# Patient Record
Sex: MALE | Race: WHITE | Employment: OTHER | URBAN - METROPOLITAN AREA
[De-identification: names, ages, dates, MRNs, and addresses within clinical notes are randomized per-mention and may not be internally consistent; named-entity substitution may affect disease eponyms.]

---

## 2020-02-14 ENCOUNTER — HOSPITAL ENCOUNTER (INPATIENT)
Age: 85
LOS: 2 days | Discharge: HOME | DRG: 308 | End: 2020-02-17
Attending: EMERGENCY MEDICINE | Admitting: INTERNAL MEDICINE
Payer: MEDICARE

## 2020-02-14 ENCOUNTER — APPOINTMENT (OUTPATIENT)
Dept: GENERAL RADIOLOGY | Age: 85
DRG: 308 | End: 2020-02-14
Attending: EMERGENCY MEDICINE
Payer: MEDICARE

## 2020-02-14 DIAGNOSIS — I48.91 ATRIAL FIBRILLATION WITH RAPID VENTRICULAR RESPONSE (HCC): ICD-10-CM

## 2020-02-14 DIAGNOSIS — J90 BILATERAL PLEURAL EFFUSION: ICD-10-CM

## 2020-02-14 DIAGNOSIS — R06.03 RESPIRATORY DISTRESS: ICD-10-CM

## 2020-02-14 DIAGNOSIS — I50.9 ACUTE CONGESTIVE HEART FAILURE, UNSPECIFIED HEART FAILURE TYPE (HCC): ICD-10-CM

## 2020-02-14 DIAGNOSIS — J96.01 ACUTE RESPIRATORY FAILURE WITH HYPOXIA (HCC): Primary | ICD-10-CM

## 2020-02-14 LAB
ALBUMIN SERPL-MCNC: 3.2 G/DL (ref 3.5–5)
ALBUMIN/GLOB SERPL: 0.8 {RATIO} (ref 1.1–2.2)
ALP SERPL-CCNC: 84 U/L (ref 45–117)
ALT SERPL-CCNC: 45 U/L (ref 12–78)
ANION GAP SERPL CALC-SCNC: 8 MMOL/L (ref 5–15)
AST SERPL-CCNC: 40 U/L (ref 15–37)
BASOPHILS # BLD: 0 K/UL (ref 0–0.1)
BASOPHILS NFR BLD: 0 % (ref 0–1)
BILIRUB SERPL-MCNC: 0.9 MG/DL (ref 0.2–1)
BNP SERPL-MCNC: 8680 PG/ML
BUN SERPL-MCNC: 24 MG/DL (ref 6–20)
BUN/CREAT SERPL: 19 (ref 12–20)
CALCIUM SERPL-MCNC: 9.1 MG/DL (ref 8.5–10.1)
CHLORIDE SERPL-SCNC: 110 MMOL/L (ref 97–108)
CO2 SERPL-SCNC: 26 MMOL/L (ref 21–32)
CREAT SERPL-MCNC: 1.29 MG/DL (ref 0.7–1.3)
DIFFERENTIAL METHOD BLD: ABNORMAL
EOSINOPHIL # BLD: 0.1 K/UL (ref 0–0.4)
EOSINOPHIL NFR BLD: 2 % (ref 0–7)
ERYTHROCYTE [DISTWIDTH] IN BLOOD BY AUTOMATED COUNT: 17.2 % (ref 11.5–14.5)
GLOBULIN SER CALC-MCNC: 3.8 G/DL (ref 2–4)
GLUCOSE SERPL-MCNC: 122 MG/DL (ref 65–100)
HCT VFR BLD AUTO: 41.4 % (ref 36.6–50.3)
HGB BLD-MCNC: 13 G/DL (ref 12.1–17)
IMM GRANULOCYTES # BLD AUTO: 0 K/UL (ref 0–0.04)
IMM GRANULOCYTES NFR BLD AUTO: 0 % (ref 0–0.5)
INR PPP: 2.9 (ref 0.9–1.1)
LYMPHOCYTES # BLD: 1 K/UL (ref 0.8–3.5)
LYMPHOCYTES NFR BLD: 15 % (ref 12–49)
MAGNESIUM SERPL-MCNC: 2.3 MG/DL (ref 1.6–2.4)
MCH RBC QN AUTO: 28.3 PG (ref 26–34)
MCHC RBC AUTO-ENTMCNC: 31.4 G/DL (ref 30–36.5)
MCV RBC AUTO: 90 FL (ref 80–99)
MONOCYTES # BLD: 0.9 K/UL (ref 0–1)
MONOCYTES NFR BLD: 13 % (ref 5–13)
NEUTS SEG # BLD: 4.8 K/UL (ref 1.8–8)
NEUTS SEG NFR BLD: 70 % (ref 32–75)
NRBC # BLD: 0 K/UL (ref 0–0.01)
NRBC BLD-RTO: 0 PER 100 WBC
PLATELET # BLD AUTO: 225 K/UL (ref 150–400)
PMV BLD AUTO: 9.9 FL (ref 8.9–12.9)
POTASSIUM SERPL-SCNC: 4 MMOL/L (ref 3.5–5.1)
PROT SERPL-MCNC: 7 G/DL (ref 6.4–8.2)
PROTHROMBIN TIME: 28.2 SEC (ref 9–11.1)
RBC # BLD AUTO: 4.6 M/UL (ref 4.1–5.7)
SODIUM SERPL-SCNC: 144 MMOL/L (ref 136–145)
TROPONIN I SERPL-MCNC: <0.05 NG/ML
TSH SERPL DL<=0.05 MIU/L-ACNC: 1.4 UIU/ML (ref 0.36–3.74)
WBC # BLD AUTO: 6.9 K/UL (ref 4.1–11.1)

## 2020-02-14 PROCEDURE — 99285 EMERGENCY DEPT VISIT HI MDM: CPT

## 2020-02-14 PROCEDURE — 80053 COMPREHEN METABOLIC PANEL: CPT

## 2020-02-14 PROCEDURE — 84443 ASSAY THYROID STIM HORMONE: CPT

## 2020-02-14 PROCEDURE — 85610 PROTHROMBIN TIME: CPT

## 2020-02-14 PROCEDURE — 85025 COMPLETE CBC W/AUTO DIFF WBC: CPT

## 2020-02-14 PROCEDURE — 96366 THER/PROPH/DIAG IV INF ADDON: CPT

## 2020-02-14 PROCEDURE — 74011000250 HC RX REV CODE- 250: Performed by: EMERGENCY MEDICINE

## 2020-02-14 PROCEDURE — 83880 ASSAY OF NATRIURETIC PEPTIDE: CPT

## 2020-02-14 PROCEDURE — 74011250636 HC RX REV CODE- 250/636: Performed by: EMERGENCY MEDICINE

## 2020-02-14 PROCEDURE — 71045 X-RAY EXAM CHEST 1 VIEW: CPT

## 2020-02-14 PROCEDURE — 84484 ASSAY OF TROPONIN QUANT: CPT

## 2020-02-14 PROCEDURE — 83735 ASSAY OF MAGNESIUM: CPT

## 2020-02-14 PROCEDURE — 36415 COLL VENOUS BLD VENIPUNCTURE: CPT

## 2020-02-14 PROCEDURE — 96365 THER/PROPH/DIAG IV INF INIT: CPT

## 2020-02-14 PROCEDURE — 93005 ELECTROCARDIOGRAM TRACING: CPT

## 2020-02-14 PROCEDURE — 94761 N-INVAS EAR/PLS OXIMETRY MLT: CPT

## 2020-02-14 RX ORDER — BENZONATATE 100 MG/1
100 CAPSULE ORAL
COMMUNITY
End: 2021-04-12

## 2020-02-14 RX ORDER — MELATONIN
1000 DAILY
COMMUNITY

## 2020-02-14 RX ORDER — LATANOPROST 50 UG/ML
1 SOLUTION/ DROPS OPHTHALMIC
COMMUNITY

## 2020-02-14 RX ORDER — WARFARIN SODIUM 5 MG/1
5 TABLET ORAL
COMMUNITY
End: 2021-04-12

## 2020-02-14 RX ORDER — CODEINE PHOSPHATE AND GUAIFENESIN 10; 100 MG/5ML; MG/5ML
5 SOLUTION ORAL
COMMUNITY
End: 2021-04-12

## 2020-02-14 RX ORDER — METOPROLOL SUCCINATE 50 MG/1
50 TABLET, EXTENDED RELEASE ORAL DAILY
COMMUNITY
End: 2020-02-17

## 2020-02-14 RX ADMIN — DEXTROSE MONOHYDRATE 2.5 MG/HR: 50 INJECTION, SOLUTION INTRAVENOUS at 20:44

## 2020-02-15 ENCOUNTER — APPOINTMENT (OUTPATIENT)
Dept: GENERAL RADIOLOGY | Age: 85
DRG: 308 | End: 2020-02-15
Attending: INTERNAL MEDICINE
Payer: MEDICARE

## 2020-02-15 ENCOUNTER — APPOINTMENT (OUTPATIENT)
Dept: NON INVASIVE DIAGNOSTICS | Age: 85
DRG: 308 | End: 2020-02-15
Attending: INTERNAL MEDICINE
Payer: MEDICARE

## 2020-02-15 PROBLEM — I48.91 ATRIAL FIBRILLATION WITH RAPID VENTRICULAR RESPONSE (HCC): Status: ACTIVE | Noted: 2020-02-15

## 2020-02-15 LAB
ALBUMIN SERPL-MCNC: 2.8 G/DL (ref 3.5–5)
ALBUMIN/GLOB SERPL: 0.8 {RATIO} (ref 1.1–2.2)
ALP SERPL-CCNC: 69 U/L (ref 45–117)
ALT SERPL-CCNC: 39 U/L (ref 12–78)
ANION GAP SERPL CALC-SCNC: 7 MMOL/L (ref 5–15)
AST SERPL-CCNC: 38 U/L (ref 15–37)
ATRIAL RATE: 139 BPM
AV VELOCITY RATIO: 0.54
BASOPHILS # BLD: 0 K/UL (ref 0–0.1)
BASOPHILS NFR BLD: 0 % (ref 0–1)
BILIRUB SERPL-MCNC: 1.1 MG/DL (ref 0.2–1)
BUN SERPL-MCNC: 23 MG/DL (ref 6–20)
BUN/CREAT SERPL: 21 (ref 12–20)
CALCIUM SERPL-MCNC: 9 MG/DL (ref 8.5–10.1)
CALCULATED R AXIS, ECG10: -115 DEGREES
CALCULATED T AXIS, ECG11: -129 DEGREES
CHLORIDE SERPL-SCNC: 111 MMOL/L (ref 97–108)
CO2 SERPL-SCNC: 26 MMOL/L (ref 21–32)
COMMENT, HOLDF: NORMAL
CREAT SERPL-MCNC: 1.11 MG/DL (ref 0.7–1.3)
DIAGNOSIS, 93000: NORMAL
DIFFERENTIAL METHOD BLD: ABNORMAL
ECHO AO ROOT DIAM: 3.92 CM
ECHO AV AREA PEAK VELOCITY: 2.2 CM2
ECHO AV AREA/BSA PEAK VELOCITY: 1 CM2/M2
ECHO AV PEAK GRADIENT: 7.6 MMHG
ECHO AV PEAK VELOCITY: 137.47 CM/S
ECHO EST RA PRESSURE: 10 MMHG
ECHO LA AREA 4C: 28.6 CM2
ECHO LA MAJOR AXIS: 4.05 CM
ECHO LA TO AORTIC ROOT RATIO: 1.03
ECHO LA VOL 4C: 86.51 ML (ref 18–58)
ECHO LA VOLUME INDEX A4C: 42.33 ML/M2 (ref 16–28)
ECHO LV EDV TEICHHOLZ: 0.43 ML
ECHO LV ESV TEICHHOLZ: 0.43 ML
ECHO LV INTERNAL DIMENSION DIASTOLIC: 4.12 CM (ref 4.2–5.9)
ECHO LV INTERNAL DIMENSION SYSTOLIC: 4.09 CM
ECHO LV IVSD: 1.46 CM (ref 0.6–1)
ECHO LV MASS 2D: 221.1 G (ref 88–224)
ECHO LV MASS INDEX 2D: 108.2 G/M2 (ref 49–115)
ECHO LV POSTERIOR WALL DIASTOLIC: 1.08 CM (ref 0.6–1)
ECHO LV POSTERIOR WALL SYSTOLIC: 1.08 CM
ECHO LVOT DIAM: 2.27 CM
ECHO LVOT PEAK GRADIENT: 2.2 MMHG
ECHO LVOT PEAK VELOCITY: 73.91 CM/S
ECHO LVOT SV: 58.1 ML
ECHO LVOT VTI: 14.41 CM
ECHO MV A VELOCITY: 36.36 CM/S
ECHO MV AREA VTI: 2.6 CM2
ECHO MV E DECELERATION TIME (DT): 36.1 MS
ECHO MV E VELOCITY: 82.19 CM/S
ECHO MV E/A RATIO: 2.26
ECHO MV MAX VELOCITY: 80.71 CM/S
ECHO MV MEAN GRADIENT: 1 MMHG
ECHO MV MEAN INFLOW VELOCITY: 0.47 M/S
ECHO MV PEAK GRADIENT: 2.6 MMHG
ECHO MV VTI: 22.04 CM
ECHO PULMONARY ARTERY SYSTOLIC PRESSURE (PASP): 43.9 MMHG
ECHO RA AREA 4C: 23.04 CM2
ECHO RIGHT VENTRICULAR SYSTOLIC PRESSURE (RVSP): 43.9 MMHG
ECHO TV REGURGITANT MAX VELOCITY: 291.1 CM/S
ECHO TV REGURGITANT PEAK GRADIENT: 33.9 MMHG
EOSINOPHIL # BLD: 0.1 K/UL (ref 0–0.4)
EOSINOPHIL NFR BLD: 2 % (ref 0–7)
ERYTHROCYTE [DISTWIDTH] IN BLOOD BY AUTOMATED COUNT: 16.9 % (ref 11.5–14.5)
GLOBULIN SER CALC-MCNC: 3.3 G/DL (ref 2–4)
GLUCOSE SERPL-MCNC: 108 MG/DL (ref 65–100)
HCT VFR BLD AUTO: 37.1 % (ref 36.6–50.3)
HGB BLD-MCNC: 11.9 G/DL (ref 12.1–17)
IMM GRANULOCYTES # BLD AUTO: 0.1 K/UL (ref 0–0.04)
IMM GRANULOCYTES NFR BLD AUTO: 1 % (ref 0–0.5)
INR PPP: 3.1 (ref 0.9–1.1)
LVFS 2D: 0.59 %
LVOT MG: 1.28 MMHG
LVOT MV: 0.52 CM/S
LVSV (TEICH): 0.5 ML
LYMPHOCYTES # BLD: 0.7 K/UL (ref 0.8–3.5)
LYMPHOCYTES NFR BLD: 11 % (ref 12–49)
MCH RBC QN AUTO: 28.7 PG (ref 26–34)
MCHC RBC AUTO-ENTMCNC: 32.1 G/DL (ref 30–36.5)
MCV RBC AUTO: 89.6 FL (ref 80–99)
MONOCYTES # BLD: 0.8 K/UL (ref 0–1)
MONOCYTES NFR BLD: 12 % (ref 5–13)
MV DEC SLOPE: 22.77
NEUTS SEG # BLD: 4.6 K/UL (ref 1.8–8)
NEUTS SEG NFR BLD: 74 % (ref 32–75)
NRBC # BLD: 0 K/UL (ref 0–0.01)
NRBC BLD-RTO: 0 PER 100 WBC
PLATELET # BLD AUTO: 203 K/UL (ref 150–400)
PMV BLD AUTO: 10.3 FL (ref 8.9–12.9)
POTASSIUM SERPL-SCNC: 3.7 MMOL/L (ref 3.5–5.1)
PROT SERPL-MCNC: 6.1 G/DL (ref 6.4–8.2)
PROTHROMBIN TIME: 30.3 SEC (ref 9–11.1)
Q-T INTERVAL, ECG07: 292 MS
QRS DURATION, ECG06: 84 MS
QTC CALCULATION (BEZET), ECG08: 444 MS
RBC # BLD AUTO: 4.14 M/UL (ref 4.1–5.7)
RBC MORPH BLD: ABNORMAL
SAMPLES BEING HELD,HOLD: NORMAL
SODIUM SERPL-SCNC: 144 MMOL/L (ref 136–145)
TROPONIN I SERPL-MCNC: <0.05 NG/ML
TROPONIN I SERPL-MCNC: <0.05 NG/ML
VENTRICULAR RATE, ECG03: 139 BPM
WBC # BLD AUTO: 6.3 K/UL (ref 4.1–11.1)

## 2020-02-15 PROCEDURE — 74011250637 HC RX REV CODE- 250/637: Performed by: INTERNAL MEDICINE

## 2020-02-15 PROCEDURE — 71045 X-RAY EXAM CHEST 1 VIEW: CPT

## 2020-02-15 PROCEDURE — 85025 COMPLETE CBC W/AUTO DIFF WBC: CPT

## 2020-02-15 PROCEDURE — 74011000250 HC RX REV CODE- 250: Performed by: EMERGENCY MEDICINE

## 2020-02-15 PROCEDURE — 65660000000 HC RM CCU STEPDOWN

## 2020-02-15 PROCEDURE — 93306 TTE W/DOPPLER COMPLETE: CPT

## 2020-02-15 PROCEDURE — 36415 COLL VENOUS BLD VENIPUNCTURE: CPT

## 2020-02-15 PROCEDURE — 85610 PROTHROMBIN TIME: CPT

## 2020-02-15 PROCEDURE — 74011250636 HC RX REV CODE- 250/636: Performed by: INTERNAL MEDICINE

## 2020-02-15 PROCEDURE — 74011250636 HC RX REV CODE- 250/636: Performed by: EMERGENCY MEDICINE

## 2020-02-15 PROCEDURE — 74011000250 HC RX REV CODE- 250: Performed by: INTERNAL MEDICINE

## 2020-02-15 PROCEDURE — 80053 COMPREHEN METABOLIC PANEL: CPT

## 2020-02-15 PROCEDURE — 84484 ASSAY OF TROPONIN QUANT: CPT

## 2020-02-15 RX ORDER — FUROSEMIDE 10 MG/ML
20 INJECTION INTRAMUSCULAR; INTRAVENOUS ONCE
Status: COMPLETED | OUTPATIENT
Start: 2020-02-15 | End: 2020-02-15

## 2020-02-15 RX ORDER — LATANOPROST 50 UG/ML
1 SOLUTION/ DROPS OPHTHALMIC
Status: DISCONTINUED | OUTPATIENT
Start: 2020-02-15 | End: 2020-02-17 | Stop reason: HOSPADM

## 2020-02-15 RX ORDER — WARFARIN 2 MG/1
4 TABLET ORAL EVERY EVENING
Status: DISCONTINUED | OUTPATIENT
Start: 2020-02-15 | End: 2020-02-16

## 2020-02-15 RX ORDER — WARFARIN SODIUM 5 MG/1
5 TABLET ORAL EVERY EVENING
Status: DISCONTINUED | OUTPATIENT
Start: 2020-02-15 | End: 2020-02-15

## 2020-02-15 RX ORDER — SODIUM CHLORIDE 0.9 % (FLUSH) 0.9 %
5-40 SYRINGE (ML) INJECTION AS NEEDED
Status: DISCONTINUED | OUTPATIENT
Start: 2020-02-15 | End: 2020-02-17 | Stop reason: HOSPADM

## 2020-02-15 RX ORDER — ONDANSETRON 2 MG/ML
4 INJECTION INTRAMUSCULAR; INTRAVENOUS
Status: DISCONTINUED | OUTPATIENT
Start: 2020-02-15 | End: 2020-02-17 | Stop reason: HOSPADM

## 2020-02-15 RX ORDER — SODIUM CHLORIDE 0.9 % (FLUSH) 0.9 %
5-40 SYRINGE (ML) INJECTION EVERY 8 HOURS
Status: DISCONTINUED | OUTPATIENT
Start: 2020-02-15 | End: 2020-02-17 | Stop reason: HOSPADM

## 2020-02-15 RX ORDER — METOPROLOL SUCCINATE 25 MG/1
25 TABLET, EXTENDED RELEASE ORAL ONCE
Status: COMPLETED | OUTPATIENT
Start: 2020-02-15 | End: 2020-02-15

## 2020-02-15 RX ORDER — ATORVASTATIN CALCIUM 20 MG/1
20 TABLET, FILM COATED ORAL DAILY
Status: DISCONTINUED | OUTPATIENT
Start: 2020-02-15 | End: 2020-02-17 | Stop reason: HOSPADM

## 2020-02-15 RX ORDER — ACETAMINOPHEN 325 MG/1
650 TABLET ORAL
Status: DISCONTINUED | OUTPATIENT
Start: 2020-02-15 | End: 2020-02-17 | Stop reason: HOSPADM

## 2020-02-15 RX ORDER — METOPROLOL SUCCINATE 50 MG/1
50 TABLET, EXTENDED RELEASE ORAL DAILY
Status: DISCONTINUED | OUTPATIENT
Start: 2020-02-15 | End: 2020-02-15

## 2020-02-15 RX ORDER — BENZONATATE 100 MG/1
100 CAPSULE ORAL
Status: DISCONTINUED | OUTPATIENT
Start: 2020-02-15 | End: 2020-02-17 | Stop reason: HOSPADM

## 2020-02-15 RX ADMIN — METOPROLOL SUCCINATE 50 MG: 50 TABLET, EXTENDED RELEASE ORAL at 08:56

## 2020-02-15 RX ADMIN — FUROSEMIDE 20 MG: 10 INJECTION, SOLUTION INTRAMUSCULAR; INTRAVENOUS at 18:00

## 2020-02-15 RX ADMIN — LATANOPROST 1 DROP: 50 SOLUTION OPHTHALMIC at 22:33

## 2020-02-15 RX ADMIN — BENZONATATE 100 MG: 100 CAPSULE ORAL at 08:56

## 2020-02-15 RX ADMIN — DEXTROSE MONOHYDRATE 10 MG/HR: 50 INJECTION, SOLUTION INTRAVENOUS at 04:54

## 2020-02-15 RX ADMIN — Medication 5 ML: at 22:33

## 2020-02-15 RX ADMIN — Medication 5 ML: at 06:03

## 2020-02-15 RX ADMIN — METOPROLOL SUCCINATE 25 MG: 25 TABLET, EXTENDED RELEASE ORAL at 13:15

## 2020-02-15 RX ADMIN — DEXTROSE MONOHYDRATE 5 MG/HR: 50 INJECTION, SOLUTION INTRAVENOUS at 18:00

## 2020-02-15 RX ADMIN — Medication 10 ML: at 13:16

## 2020-02-15 RX ADMIN — ATORVASTATIN CALCIUM 20 MG: 20 TABLET, FILM COATED ORAL at 08:56

## 2020-02-15 RX ADMIN — WARFARIN SODIUM 4 MG: 2 TABLET ORAL at 17:10

## 2020-02-15 NOTE — ED NOTES
Pt c/o shortness of breath with palpitations, fatigue & cough. Came via POV from [facility] ED after leaving AMA today, \"I wanted to be closer here to my daughter. \" Was initially referred by provider today for concern of pnemonia. 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 Was hypoxic well as tachycardic. at doctors office today. Was given lasix, diltiazem bolus x2, lopressor x2 @ [facility] ED prior to leaving this evening. Denies CP or home oxygen as well has no cardiologist.     Darwin Faye: TRANSFER - OUT REPORT:    Verbal report given to Héctor West RN (name) on Dianne Mora  being transferred to PCU (unit) for routine progression of care       Report consisted of patients Situation, Background, Assessment and   Recommendations(SBAR). Information from the following report(s) SBAR, Kardex, ED Summary, Intake/Output, MAR, Recent Results, Med Rec Status and Cardiac Rhythm Afib was reviewed with the receiving nurse. Lines:   Peripheral IV 02/14/20 Right Antecubital (Active)   Site Assessment Clean, dry, & intact 2/14/2020  8:12 PM   Phlebitis Assessment 0 2/14/2020  8:12 PM   Infiltration Assessment 0 2/14/2020  8:12 PM   Dressing Status Clean, dry, & intact 2/14/2020  8:12 PM   Dressing Type Transparent 2/14/2020  8:12 PM   Hub Color/Line Status Patent;Capped;Flushed 2/14/2020  8:12 PM   Action Taken Blood drawn 2/14/2020  8:12 PM   Alcohol Cap Used No 2/14/2020  8:12 PM       Peripheral IV 02/14/20 Left Antecubital (Active)   Site Assessment Clean, dry, & intact 2/14/2020  8:22 PM   Phlebitis Assessment 0 2/14/2020  8:22 PM   Infiltration Assessment 0 2/14/2020  8:22 PM   Dressing Status Clean, dry, & intact 2/14/2020  8:22 PM   Dressing Type Tape;Transparent 2/14/2020  8:22 PM   Hub Color/Line Status Pink 2/14/2020  8:22 PM   Alcohol Cap Used Yes 2/14/2020  8:22 PM        Opportunity for questions and clarification was provided.       Patient transported with:   Monitor  Registered Nurse  Tech   Belongings & chart transferred to floor with patient.

## 2020-02-15 NOTE — H&P
Hospitalist Admission Note    NAME: Mario Adan   :  1928   MRN:  [id number]     Date/Time:  2/15/2020 2:26 AM    Patient PCP: Bill Palomino MD  ______________________________________________________________________  Given the patient's current clinical presentation, I have a high level of concern for decompensation if discharged from the emergency department. Complex decision making was performed, which includes reviewing the patient's available past medical records, laboratory results, and x-ray films. My assessment of this patient's clinical condition and my plan of care is as follows. Assessment / Plan:  Atrial fibrillation with RVR  -Admit patient to stepdown  - Start patient on Cardizem drip  -Follow-up serial troponin-echocardiogram  -Cardiology consultation    Bilateral pleural effusions and interstitial edema  -start patient on lasix     Hypertension-continue home antihypertensive medication    Hyperlipidemia-continue Zocor 20 mg daily    Glucoma  -Continue Lantus processed       HX DVT  -Continue Coumadin as per pharmacy dose      Code Status: Full   Surrogate Decision Maker:Caron Paulson    DVT Prophylaxis: coumadin   GI Prophylaxis: not indicated        Subjective:   CHIEF COMPLAINT: palpitation     HISTORY OF PRESENT ILLNESS:     80years old male with past medical history significant for hypertension, history of DVT, hyperlipidemia was transferred from [facility] for evaluation of atrial fibrillation with RVR associated with shortness of breath worsened with exertion patient denied chest pain denied fever any chills patient also complained from productive cough whitish colored sputum but denies any fever any chills, chest x-ray was done and show bilateral pleural effusion and interstitial edema    We were asked to admit for work up and evaluation of the above problems.      Past Medical History:   Diagnosis Date    Arthritis     DJD    Cancer [facility])     prostate    Chronic pain     History of colonoscopy with polypectomy 2005~    Hypercholesterolemia     Other ill-defined conditions(799.89)     cataracts    Thromboembolus ([address]     3 x left leg, 1 x right leg        Past Surgical History:   Procedure Laterality Date    HX CATARACT REMOVAL      HX MOHS PROCEDURES  1995    right    HX OTHER SURGICAL  1996    Tony filter insertion    HX PROSTATECTOMY  age 72    HX TONSILLECTOMY      TOTAL HIP ARTHROPLASTY      left    TOTAL KNEE ARTHROPLASTY      right       Social History     Tobacco Use    Smoking status: Former Smoker    Smokeless tobacco: Never Used    Tobacco comment: quit 15 yrs ago   Substance Use Topics    Alcohol use: Yes     Comment: seldom        Family History   Family history unknown: Yes     No Known Allergies     Prior to Admission medications    Medication Sig Start Date End Date Taking? Authorizing Provider   warfarin sodium (COUMADIN PO) Take 7.5 mg by mouth five (5) days a week. Tuesday, Wednesday, Thursday, Saturday & Sunday   Yes Other, MD Clement   warfarin (COUMADIN) 5 mg tablet Take 5 mg by mouth every Monday. Yes Clement Mota MD   warfarin (COUMADIN) 5 mg tablet Take 5 mg by mouth Every Friday. Yes Nakul, MD Clement   vit C/E/Zn/coppr/lutein/zeaxan (PRESERVISION AREDS-2 PO) Take  by mouth daily. Yes Clement Mota MD   metoprolol succinate (TOPROL XL) 50 mg XL tablet Take 50 mg by mouth daily. Yes Nakul, MD Clement   latanoprost (XALATAN) 0.005 % ophthalmic solution Administer 1 Drop to both eyes nightly. Yes Nakul, MD Clement   guaiFENesin-codeine (ROBITUSSIN AC) 100-10 mg/5 mL solution Take 5 mL by mouth three (3) times daily as needed for Cough. Yes Nakul, MD Clement   cholecalciferol (VITAMIN D3) (1000 Units /25 mcg) tablet Take 1,000 Units by mouth daily. Yes Nakul, MD Clement   benzonatate (TESSALON PERLES) 100 mg capsule Take 100 mg by mouth three (3) times daily as needed for Cough.    Yes Clement Mota MD simvastatin (ZOCOR) 20 mg tablet Take 20 mg by mouth nightly. Indications: HYPERCHOLESTEROLEMIA    Provider, Historical       REVIEW OF SYSTEMS:     I am not able to complete the review of systems because: The patient is intubated and sedated    The patient has altered mental status due to his acute medical problems    The patient has baseline aphasia from prior stroke(s)    The patient has baseline dementia and is not reliable historian    The patient is in acute medical distress and unable to provide information           Total of 12 systems reviewed as follows:       POSITIVE= underlined text  Negative = text not underlined  General:  fever, chills, sweats, generalized weakness, weight loss/gain,      loss of appetite   Eyes:    blurred vision, eye pain, loss of vision, double vision  ENT:    rhinorrhea, pharyngitis   Respiratory:   cough, sputum production, SOB, MOMIN, wheezing, pleuritic pain   Cardiology:   chest pain, palpitations, orthopnea, PND, edema, syncope   Gastrointestinal:  abdominal pain , N/V, diarrhea, dysphagia, constipation, bleeding   Genitourinary:  frequency, urgency, dysuria, hematuria, incontinence   Muskuloskeletal :  arthralgia, myalgia, back pain  Hematology:  easy bruising, nose or gum bleeding, lymphadenopathy   Dermatological: rash, ulceration, pruritis, color change / jaundice  Endocrine:   hot flashes or polydipsia   Neurological:  headache, dizziness, confusion, focal weakness, paresthesia,     Speech difficulties, memory loss, gait difficulty  Psychological: Feelings of anxiety, depression, agitation    Objective:   VITALS:    Visit Vitals  /72   Pulse 92   Temp 98.5 °F (36.9 °C)   Resp 26   Ht 5' 8\" (1.727 m)   Wt 90.7 kg (200 lb)   SpO2 94%   BMI 30.41 kg/m²       PHYSICAL EXAM:    General:    Alert, cooperative, no distress, appears stated age.      HEENT: Atraumatic, anicteric sclerae, pink conjunctivae     No oral ulcers, mucosa moist, throat clear, dentition fair  Neck:  Supple, symmetrical,  thyroid: non tender  Lungs:   Clear to auscultation bilaterally. No Wheezing or Rhonchi. No rales. Chest wall:  No tenderness  No Accessory muscle use. Heart:   IRRegular  rhythm,  No  murmur   No edema  Abdomen:   Soft, non-tender. Not distended. Bowel sounds normal  Extremities: No cyanosis. No clubbing,      Skin turgor normal, Capillary refill normal, Radial dial pulse 2+  Skin:     Not pale. Not Jaundiced  No rashes   Psych:  Good insight. Not depressed. Not anxious or agitated. Neurologic: EOMs intact. No facial asymmetry. No aphasia or slurred speech. Symmetrical strength, Sensation grossly intact. Alert and oriented X 4.     _______________________________________________________________________  Care Plan discussed with:    Comments   Patient y    Family      RN y    Care Manager                    Consultant:      _______________________________________________________________________  Expected  Disposition:   Home with Family y   HH/PT/OT/RN    SNF/LTC    SHAISTA    ________________________________________________________________________  TOTAL TIME: 61  Minutes    Critical Care Provided     Minutes non procedure based      Comments    y Reviewed previous records   >50% of visit spent in counseling and coordination of care y Discussion with patient and/or family and questions answered       ________________________________________________________________________  Signed: Edward Marcial MD    Procedures: see electronic medical records for all procedures/Xrays and details which were not copied into this note but were reviewed prior to creation of Plan.     LAB DATA REVIEWED:    Recent Results (from the past 24 hour(s))   EKG, 12 LEAD, INITIAL    Collection Time: 02/14/20  7:52 PM   Result Value Ref Range    Ventricular Rate 139 BPM    Atrial Rate 139 BPM    QRS Duration 84 ms    Q-T Interval 292 ms    QTC Calculation (Bezet) 444 ms    Calculated R Axis -115 degrees Calculated T Axis -129 degrees    Diagnosis       Supraventricular tachycardia  Right superior axis deviation  ST & T wave abnormality, consider inferior ischemia  ST & T wave abnormality, consider anterolateral ischemia  No previous ECGs available     CBC WITH AUTOMATED DIFF    Collection Time: 02/14/20  8:12 PM   Result Value Ref Range    WBC 6.9 4.1 - 11.1 K/uL    RBC 4.60 4.10 - 5.70 M/uL    HGB 13.0 12.1 - 17.0 g/dL    HCT 41.4 36.6 - 50.3 %    MCV 90.0 80.0 - 99.0 FL    MCH 28.3 26.0 - 34.0 PG    MCHC 31.4 30.0 - 36.5 g/dL    RDW 17.2 (H) 11.5 - 14.5 %    PLATELET 389 411 - 854 K/uL    MPV 9.9 8.9 - 12.9 FL    NRBC 0.0 0  WBC    ABSOLUTE NRBC 0.00 0.00 - 0.01 K/uL    NEUTROPHILS 70 32 - 75 %    LYMPHOCYTES 15 12 - 49 %    MONOCYTES 13 5 - 13 %    EOSINOPHILS 2 0 - 7 %    BASOPHILS 0 0 - 1 %    IMMATURE GRANULOCYTES 0 0.0 - 0.5 %    ABS. NEUTROPHILS 4.8 1.8 - 8.0 K/UL    ABS. LYMPHOCYTES 1.0 0.8 - 3.5 K/UL    ABS. MONOCYTES 0.9 0.0 - 1.0 K/UL    ABS. EOSINOPHILS 0.1 0.0 - 0.4 K/UL    ABS. BASOPHILS 0.0 0.0 - 0.1 K/UL    ABS. IMM. GRANS. 0.0 0.00 - 0.04 K/UL    DF AUTOMATED     METABOLIC PANEL, COMPREHENSIVE    Collection Time: 02/14/20  8:12 PM   Result Value Ref Range    Sodium 144 136 - 145 mmol/L    Potassium 4.0 3.5 - 5.1 mmol/L    Chloride 110 (H) 97 - 108 mmol/L    CO2 26 21 - 32 mmol/L    Anion gap 8 5 - 15 mmol/L    Glucose 122 (H) 65 - 100 mg/dL    BUN 24 (H) 6 - 20 MG/DL    Creatinine 1.29 0.70 - 1.30 MG/DL    BUN/Creatinine ratio 19 12 - 20      GFR est AA >60 >60 ml/min/1.73m2    GFR est non-AA 52 (L) >60 ml/min/1.73m2    Calcium 9.1 8.5 - 10.1 MG/DL    Bilirubin, total 0.9 0.2 - 1.0 MG/DL    ALT (SGPT) 45 12 - 78 U/L    AST (SGOT) 40 (H) 15 - 37 U/L    Alk.  phosphatase 84 45 - 117 U/L    Protein, total 7.0 6.4 - 8.2 g/dL    Albumin 3.2 (L) 3.5 - 5.0 g/dL    Globulin 3.8 2.0 - 4.0 g/dL    A-G Ratio 0.8 (L) 1.1 - 2.2     TROPONIN I    Collection Time: 02/14/20  8:12 PM   Result Value Ref Range    Troponin-I, Qt. <0.05 <0.05 ng/mL   MAGNESIUM    Collection Time: 02/14/20  8:12 PM   Result Value Ref Range    Magnesium 2.3 1.6 - 2.4 mg/dL   PROTHROMBIN TIME + INR    Collection Time: 02/14/20  8:12 PM   Result Value Ref Range    INR 2.9 (H) 0.9 - 1.1      Prothrombin time 28.2 (H) 9.0 - 11.1 sec   NT-PRO BNP    Collection Time: 02/14/20  8:12 PM   Result Value Ref Range    NT pro-BNP 8,680 (H) <450 PG/ML   TSH 3RD GENERATION    Collection Time: 02/14/20  8:12 PM   Result Value Ref Range    TSH 1.40 0.36 - 8.03 uIU/mL   METABOLIC PANEL, COMPREHENSIVE    Collection Time: 02/15/20  1:25 AM   Result Value Ref Range    Sodium 144 136 - 145 mmol/L    Potassium 3.7 3.5 - 5.1 mmol/L    Chloride 111 (H) 97 - 108 mmol/L    CO2 26 21 - 32 mmol/L    Anion gap 7 5 - 15 mmol/L    Glucose 108 (H) 65 - 100 mg/dL    BUN 23 (H) 6 - 20 MG/DL    Creatinine 1.11 0.70 - 1.30 MG/DL    BUN/Creatinine ratio 21 (H) 12 - 20      GFR est AA >60 >60 ml/min/1.73m2    GFR est non-AA >60 >60 ml/min/1.73m2    Calcium 9.0 8.5 - 10.1 MG/DL    Bilirubin, total 1.1 (H) 0.2 - 1.0 MG/DL    ALT (SGPT) 39 12 - 78 U/L    AST (SGOT) 38 (H) 15 - 37 U/L    Alk. phosphatase 69 45 - 117 U/L    Protein, total 6.1 (L) 6.4 - 8.2 g/dL    Albumin 2.8 (L) 3.5 - 5.0 g/dL    Globulin 3.3 2.0 - 4.0 g/dL    A-G Ratio 0.8 (L) 1.1 - 2.2     CBC WITH AUTOMATED DIFF    Collection Time: 02/15/20  1:25 AM   Result Value Ref Range    WBC 6.3 4.1 - 11.1 K/uL    RBC 4.14 4.10 - 5.70 M/uL    HGB 11.9 (L) 12.1 - 17.0 g/dL    HCT 37.1 36.6 - 50.3 %    MCV 89.6 80.0 - 99.0 FL    MCH 28.7 26.0 - 34.0 PG    MCHC 32.1 30.0 - 36.5 g/dL    RDW 16.9 (H) 11.5 - 14.5 %    PLATELET 787 339 - 361 K/uL    MPV 10.3 8.9 - 12.9 FL    NRBC 0.0 0  WBC    ABSOLUTE NRBC 0.00 0.00 - 0.01 K/uL    NEUTROPHILS 74 32 - 75 %    LYMPHOCYTES 11 (L) 12 - 49 %    MONOCYTES 12 5 - 13 %    EOSINOPHILS 2 0 - 7 %    BASOPHILS 0 0 - 1 %    IMMATURE GRANULOCYTES 1 (H) 0.0 - 0.5 %    ABS. NEUTROPHILS 4.6 1.8 - 8.0 K/UL    ABS. LYMPHOCYTES 0.7 (L) 0.8 - 3.5 K/UL    ABS. MONOCYTES 0.8 0.0 - 1.0 K/UL    ABS. EOSINOPHILS 0.1 0.0 - 0.4 K/UL    ABS. BASOPHILS 0.0 0.0 - 0.1 K/UL    ABS. IMM. GRANS. 0.1 (H) 0.00 - 0.04 K/UL    DF SMEAR SCANNED      RBC COMMENTS NORMOCYTIC, NORMOCHROMIC     TROPONIN I    Collection Time: 02/15/20  1:25 AM   Result Value Ref Range    Troponin-I, Qt. <0.05 <0.05 ng/mL   SAMPLES BEING HELD    Collection Time: 02/15/20  1:25 AM   Result Value Ref Range    SAMPLES BEING HELD SST     COMMENT        Add-on orders for these samples will be processed based on acceptable specimen integrity and analyte stability, which may vary by analyte.

## 2020-02-15 NOTE — ED PROVIDER NOTES
EMERGENCY DEPARTMENT HISTORY AND PHYSICAL EXAM      Please note that this dictation was completed with [employer], the computer voice recognition software. Quite often unanticipated grammatical, syntax, homophones, and other interpretive errors are inadvertently transcribed by the computer software. Please disregard these errors and any errors that have escaped final proofreading. Thank you. Date: 2/14/2020  Patient Name: Phil Cortez  Patient Age and Sex: 80 y.o. male    History of Presenting Illness     Chief Complaint   Patient presents with    Rapid Heart Rate     onset of sxs, 3 - 4 weeks ago - no cardiologist at this time - reports sob with exertion - on coumadin d/t DVTs - Denies constant sob / sob / weakness / fevers / N / V / D / changes        History Provided By: Patient    HPI: Phil Cortez, 80 y.o. male with past medical history as documented below presents to the ED with c/o of acute onset of palpitation with associated shortness of breath. Pt reports onset of symptoms occurring about 3 to 4 weeks ago but currently worsening today. Patient went to [facility] ER for shortness of breath, tachycardia and productive cough. Patient was noted to have acute CHF with elevated troponin. Patient was given metoprolol, diltiazem as well as Lasix. Patient wanted to be discharged home as he went to be closer with family and therefore left AMA. He was initially referred to the outside hospital for possible pneumonia as he had a heart rate of 150 and saturations 96% room air. 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 Pt denies any other alleviating or exacerbating factors. Additionally, pt specifically denies any recent fever, chills, headache, nausea, vomiting, abdominal pain, CP, lightheadedness, dizziness, numbness, weakness, BLE swelling, urinary sxs, diarrhea, constipation, melena, hematochezia.     There are no other complaints, changes or physical findings at this time. PCP: Shasha Romo MD    Past History   Past Medical History:  Past Medical History:   Diagnosis Date    Arthritis     DJD    Cancer [facility])     prostate    Chronic pain     History of colonoscopy with polypectomy 2005~    Hypercholesterolemia     Other ill-defined conditions(799.89)     cataracts    Thromboembolus ([address]     3 x left leg, 1 x right leg       Past Surgical History:  Past Surgical History:   Procedure Laterality Date    HX CATARACT REMOVAL      HX MOHS PROCEDURES  1995    right    HX OTHER SURGICAL  1996    [place] filter insertion    HX PROSTATECTOMY  age 72    HX TONSILLECTOMY      TOTAL HIP ARTHROPLASTY      left    TOTAL KNEE ARTHROPLASTY      right       Family History:  Family History   Family history unknown: Yes       Social History:  Social History     Tobacco Use    Smoking status: Former Smoker    Smokeless tobacco: Never Used    Tobacco comment: quit 15 yrs ago   Substance Use Topics    Alcohol use: Yes     Comment: seldom    Drug use: No       Allergies:  No Known Allergies    Current Medications:  No current facility-administered medications on file prior to encounter. Current Outpatient Medications on File Prior to Encounter   Medication Sig Dispense Refill    warfarin sodium (COUMADIN PO) Take 7.5 mg by mouth five (5) days a week. Tuesday, Wednesday, Thursday, Saturday & Sunday      warfarin (COUMADIN) 5 mg tablet Take 5 mg by mouth every Monday.  warfarin (COUMADIN) 5 mg tablet Take 5 mg by mouth Every Friday.  vit C/E/Zn/coppr/lutein/zeaxan (PRESERVISION AREDS-2 PO) Take  by mouth daily.  metoprolol succinate (TOPROL XL) 50 mg XL tablet Take 50 mg by mouth daily.  latanoprost (XALATAN) 0.005 % ophthalmic solution Administer 1 Drop to both eyes nightly.  guaiFENesin-codeine (ROBITUSSIN AC) 100-10 mg/5 mL solution Take 5 mL by mouth three (3) times daily as needed for Cough.       cholecalciferol (VITAMIN D3) (1000 Units /25 mcg) tablet Take 1,000 Units by mouth daily.  benzonatate (TESSALON PERLES) 100 mg capsule Take 100 mg by mouth three (3) times daily as needed for Cough.  simvastatin (ZOCOR) 20 mg tablet Take 20 mg by mouth nightly. Indications: HYPERCHOLESTEROLEMIA         Review of Systems   Review of Systems   Constitutional: Negative. Negative for chills and fever. HENT: Positive for congestion. Negative for facial swelling, rhinorrhea, sore throat, trouble swallowing and voice change. Eyes: Negative. Respiratory: Positive for cough and shortness of breath. Negative for apnea, chest tightness and wheezing. Cardiovascular: Positive for palpitations. Negative for chest pain and leg swelling. Gastrointestinal: Negative. Negative for abdominal distention, abdominal pain, blood in stool, constipation, diarrhea, nausea and vomiting. Endocrine: Negative. Negative for cold intolerance, heat intolerance and polyuria. Genitourinary: Negative. Negative for difficulty urinating, dysuria, flank pain, frequency, hematuria and urgency. Musculoskeletal: Negative. Negative for arthralgias, back pain, myalgias, neck pain and neck stiffness. Skin: Negative. Negative for color change and rash. Neurological: Negative. Negative for dizziness, syncope, facial asymmetry, speech difficulty, weakness, light-headedness, numbness and headaches. Hematological: Negative. Does not bruise/bleed easily. Psychiatric/Behavioral: Negative. Negative for confusion and self-injury. The patient is not nervous/anxious. Physical Exam   Physical Exam  Vitals signs and nursing note reviewed. Constitutional:       General: He is in acute distress. Appearance: He is well-developed. He is ill-appearing, toxic-appearing and diaphoretic. HENT:      Head: Normocephalic and atraumatic. Mouth/Throat:      Pharynx: No posterior oropharyngeal erythema.    Eyes:      Conjunctiva/sclera: Conjunctivae normal.      Pupils: Pupils are equal, round, and reactive to light. Neck:      Musculoskeletal: Normal range of motion. Comments: +JVD  Cardiovascular:      Rate and Rhythm: Tachycardia present. Rhythm irregular. Heart sounds: Normal heart sounds. No murmur. No friction rub. No gallop. Pulmonary:      Effort: Respiratory distress present. Breath sounds: Rales present. No wheezing. Comments: Hypoxic 88% on RA  Chest:      Chest wall: No tenderness. Abdominal:      General: Bowel sounds are normal. There is no distension. Palpations: Abdomen is soft. There is no mass. Tenderness: There is no abdominal tenderness. There is no guarding or rebound. Musculoskeletal: Normal range of motion. General: No tenderness or deformity. Right lower leg: Edema present. Left lower leg: Edema present. Skin:     General: Skin is warm. Findings: No rash. Neurological:      Mental Status: He is alert and oriented to person, place, and time. Cranial Nerves: No cranial nerve deficit. Motor: No abnormal muscle tone. Coordination: Coordination normal.      Deep Tendon Reflexes: Reflexes normal.   Psychiatric:         Behavior: Behavior is cooperative.          Diagnostic Study Results     Labs -  Recent Results (from the past 24 hour(s))   EKG, 12 LEAD, INITIAL    Collection Time: 02/14/20  7:52 PM   Result Value Ref Range    Ventricular Rate 139 BPM    Atrial Rate 139 BPM    QRS Duration 84 ms    Q-T Interval 292 ms    QTC Calculation (Bezet) 444 ms    Calculated R Axis -115 degrees    Calculated T Axis -129 degrees    Diagnosis       Supraventricular tachycardia  Right superior axis deviation  ST & T wave abnormality, consider inferior ischemia  ST & T wave abnormality, consider anterolateral ischemia  No previous ECGs available     CBC WITH AUTOMATED DIFF    Collection Time: 02/14/20  8:12 PM   Result Value Ref Range    WBC 6.9 4.1 - 11.1 K/uL    RBC 4.60 4.10 - 5.70 M/uL    HGB 13.0 12.1 - 17.0 g/dL    HCT 41.4 36.6 - 50.3 %    MCV 90.0 80.0 - 99.0 FL    MCH 28.3 26.0 - 34.0 PG    MCHC 31.4 30.0 - 36.5 g/dL    RDW 17.2 (H) 11.5 - 14.5 %    PLATELET 277 746 - 890 K/uL    MPV 9.9 8.9 - 12.9 FL    NRBC 0.0 0  WBC    ABSOLUTE NRBC 0.00 0.00 - 0.01 K/uL    NEUTROPHILS 70 32 - 75 %    LYMPHOCYTES 15 12 - 49 %    MONOCYTES 13 5 - 13 %    EOSINOPHILS 2 0 - 7 %    BASOPHILS 0 0 - 1 %    IMMATURE GRANULOCYTES 0 0.0 - 0.5 %    ABS. NEUTROPHILS 4.8 1.8 - 8.0 K/UL    ABS. LYMPHOCYTES 1.0 0.8 - 3.5 K/UL    ABS. MONOCYTES 0.9 0.0 - 1.0 K/UL    ABS. EOSINOPHILS 0.1 0.0 - 0.4 K/UL    ABS. BASOPHILS 0.0 0.0 - 0.1 K/UL    ABS. IMM. GRANS. 0.0 0.00 - 0.04 K/UL    DF AUTOMATED     METABOLIC PANEL, COMPREHENSIVE    Collection Time: 02/14/20  8:12 PM   Result Value Ref Range    Sodium 144 136 - 145 mmol/L    Potassium 4.0 3.5 - 5.1 mmol/L    Chloride 110 (H) 97 - 108 mmol/L    CO2 26 21 - 32 mmol/L    Anion gap 8 5 - 15 mmol/L    Glucose 122 (H) 65 - 100 mg/dL    BUN 24 (H) 6 - 20 MG/DL    Creatinine 1.29 0.70 - 1.30 MG/DL    BUN/Creatinine ratio 19 12 - 20      GFR est AA >60 >60 ml/min/1.73m2    GFR est non-AA 52 (L) >60 ml/min/1.73m2    Calcium 9.1 8.5 - 10.1 MG/DL    Bilirubin, total 0.9 0.2 - 1.0 MG/DL    ALT (SGPT) 45 12 - 78 U/L    AST (SGOT) 40 (H) 15 - 37 U/L    Alk.  phosphatase 84 45 - 117 U/L    Protein, total 7.0 6.4 - 8.2 g/dL    Albumin 3.2 (L) 3.5 - 5.0 g/dL    Globulin 3.8 2.0 - 4.0 g/dL    A-G Ratio 0.8 (L) 1.1 - 2.2     TROPONIN I    Collection Time: 02/14/20  8:12 PM   Result Value Ref Range    Troponin-I, Qt. <0.05 <0.05 ng/mL   MAGNESIUM    Collection Time: 02/14/20  8:12 PM   Result Value Ref Range    Magnesium 2.3 1.6 - 2.4 mg/dL   PROTHROMBIN TIME + INR    Collection Time: 02/14/20  8:12 PM   Result Value Ref Range    INR 2.9 (H) 0.9 - 1.1      Prothrombin time 28.2 (H) 9.0 - 11.1 sec   NT-PRO BNP    Collection Time: 02/14/20  8:12 PM   Result Value Ref Range NT pro-BNP 8,680 (H) <450 PG/ML   TSH 3RD GENERATION    Collection Time: 02/14/20  8:12 PM   Result Value Ref Range    TSH 1.40 0.36 - 3.74 uIU/mL       Radiologic Studies -   XR CHEST PORT   Final Result    impression: CHF as above. CT Results  (Last 48 hours)    None        CXR Results  (Last 48 hours)               02/14/20 2048  XR CHEST PORT Final result    Impression:   impression: CHF as above. Narrative:  Clinical indication: Tachycardia. Portable AP semiupright view of the chest. No prior. The heart is enlarged. Bilateral pleural effusions and interstitial edema. Medical Decision Making   I am the first provider for this patient. I reviewed the vital signs, available nursing notes, past medical history, past surgical history, family history and social history. Vital Signs-Reviewed the patient's vital signs. Patient Vitals for the past 24 hrs:   Temp Pulse Resp BP SpO2   02/15/20 0001 98 °F (36.7 °C) 99 24 (!) 127/91 94 %   02/14/20 2300 -- 90 (!) 31 135/70 93 %   02/14/20 2230 -- (!) 124 27 102/73 93 %   02/14/20 2229 -- (!) 128 29 102/73 92 %   02/14/20 2226 -- (!) 132 (!) 36 -- 91 %   02/14/20 2222 -- (!) 119 22 -- 93 %   02/14/20 2218 -- (!) 119 27 -- 93 %   02/14/20 2216 -- (!) 137 29 -- 96 %   02/14/20 2213 -- (!) 137 (!) 34 -- 97 %   02/14/20 2200 -- (!) 138 (!) 37 (!) 115/94 90 %   02/14/20 2159 -- (!) 138 (!) 39 -- (!) 89 %   02/14/20 2130 -- (!) 138 (!) 37 114/89 97 %   02/14/20 2125 -- (!) 138 29 (!) 122/92 96 %   02/14/20 2103 -- (!) 139 28 (!) 119/97 95 %   02/14/20 2012 -- (!) 139 (!) 35 116/88 94 %   02/14/20 1952 97.7 °F (36.5 °C) (!) 140 20 127/89 98 %     Pulse Oximetry Analysis - 89% on RA    Cardiac Monitor:   Rate: 140 bpm  Rhythm: Atrial Fibrillation with RVR     ED EKG interpretation:  Rhythm: atrial fib and with RVR; and irregular.  Rate (approx.): 139; Axis: normal; P wave: normal; QRS interval: normal ; ST/T wave: normal; Other findings: normal. This EKG was interpreted by Haroon Alcazar M.D. Records Reviewed: Nursing Notes, Old Medical Records, Previous electrocardiograms, Previous Radiology Studies and Previous Laboratory Studies    Provider Notes (Medical Decision Making):   Patient presents with acute dyspnea and palpitations. DDx: asthma, copd, pna, pulmonary edema, acute bronchitis, ACS, ptx, pna. Will obtain EKG, labs, CXR, provide O2 as needed for hypoxia, treat symptomatically and reassess. Will continue to monitor closely in ED. ED Course:   Initial assessment performed. The patients presenting problems have been discussed, and they are in agreement with the care plan formulated and outlined with them. I have encouraged them to ask questions as they arise throughout their visit. I reviewed our electronic medical record system for any past medical records that were available that may contribute to the patient's current condition, the nursing notes and vital signs from today's visit.   Genesis Marcum MD    ED Orders Placed :  Orders Placed This Encounter    XR CHEST PORT    XR CHEST PORT    CBC WITH AUTOMATED DIFF    COMPREHENSIVE METABOLIC PANEL    TROPONIN I    MAGNESIUM    PROTHROMBIN TIME + INR    NT-PRO BNP    TSH 3RD GENERATION    SAMPLES BEING HELD    PROTHROMBIN TIME + INR    CARDIAC MONITORING    NOTIFY PROVIDER: SPECIFY Notify provider on pt's arrival to floor ONE TIME STAT    INTAKE AND OUTPUT    VITAL SIGNS PER UNIT ROUTINE    EKG 12 LEAD INITIAL    EKG, 12 LEAD, SUBSEQUENT    EKG, 12 LEAD, SUBSEQUENT    EKG, 12 LEAD, INITIAL    DISCONTD: dilTIAZem (CARDIZEM) 100 mg in dextrose 5% (MBP/ADV) 100 mL infusion    warfarin sodium (COUMADIN PO)    warfarin (COUMADIN) 5 mg tablet    warfarin (COUMADIN) 5 mg tablet    vit C/E/Zn/coppr/lutein/zeaxan (PRESERVISION AREDS-2 PO)    DISCONTD: metoprolol succinate (TOPROL XL) 50 mg XL tablet    latanoprost (XALATAN) 0.005 % ophthalmic solution    guaiFENesin-codeine (ROBITUSSIN AC) 100-10 mg/5 mL solution    cholecalciferol (VITAMIN D3) (1000 Units /25 mcg) tablet    benzonatate (TESSALON PERLES) 100 mg capsule    DISCONTD: acetaminophen (TYLENOL) tablet 650 mg    DISCONTD: sodium chloride (NS) flush 5-40 mL    DISCONTD: sodium chloride (NS) flush 5-40 mL    DISCONTD: ondansetron (ZOFRAN) injection 4 mg    DISCONTD: atorvastatin (LIPITOR) tablet 20 mg    DISCONTD: benzonatate (TESSALON) capsule 100 mg    DISCONTD: metoprolol succinate (TOPROL-XL) XL tablet 50 mg    DISCONTD: latanoprost (XALATAN) 0.005 % ophthalmic solution 1 Drop    DISCONTD: warfarin (COUMADIN) tablet 5 mg    DISCONTD: metoprolol tartrate (LOPRESSOR) tablet 75 mg    metoprolol succinate (TOPROL-XL) XL tablet 25 mg    DISCONTD: warfarin (COUMADIN) tablet 4 mg    furosemide (LASIX) injection 20 mg    DISCONTD: dilTIAZem (CARDIZEM) 100 mg in dextrose 5% (MBP/ADV) 100 mL infusion    DISCONTD: metoprolol tartrate (LOPRESSOR) tablet 100 mg    metoprolol tartrate (LOPRESSOR) tablet 25 mg    DISCONTD: amiodarone (CORDARONE) tablet 400 mg    DISCONTD: WARFARIN INFORMATION NOTE (COUMADIN)    warfarin (COUMADIN) tablet 5 mg    amiodarone (CORDARONE) 150 mg in dextrose 5% 100 ml bolus premix 150 mg    amiodarone (CORDARONE) 375 mg/250 mL D5W infusion    DISCONTD: amiodarone (CORDARONE) 375 mg/250 mL D5W infusion    DISCONTD: midazolam (VERSED) injection 0.5-1 mg    DISCONTD: fentaNYL citrate (PF) injection 25-50 mcg    butamben-tetracaine-benzocaine (CETACAINE) 2 %-2 %-14 % (200 mg/sec) topical spray 1 Spray    lidocaine (XYLOCAINE) 2 % viscous solution 15 mL    DISCONTD: warfarin (COUMADIN) tablet 3 mg    atorvastatin (LIPITOR) 20 mg tablet    metoprolol tartrate (LOPRESSOR) 100 mg IR tablet    amiodarone (CORDARONE) 200 mg tablet    IP CONSULT TO CARDIOLOGY    INITIAL PHYSICIAN ORDER: INPATIENT Stepdown; 3.  Patient receiving treatment that can only be provided in an inpatient setting (further clarification in H&P documentation)     ED Medications Administered:  Medications   amiodarone (CORDARONE) 375 mg/250 mL D5W infusion (0 mg/min IntraVENous IV Completed 2/17/20 0900)   metoprolol succinate (TOPROL-XL) XL tablet 25 mg (25 mg Oral Given 2/15/20 1315)   furosemide (LASIX) injection 20 mg (20 mg IntraVENous Given 2/15/20 1800)   metoprolol tartrate (LOPRESSOR) tablet 25 mg (25 mg Oral Given 2/16/20 1211)   warfarin (COUMADIN) tablet 5 mg (5 mg Oral Given 2/16/20 1709)   amiodarone (CORDARONE) 150 mg in dextrose 5% 100 ml bolus premix 150 mg (150 mg IntraVENous Given 2/16/20 1528)   butamben-tetracaine-benzocaine (CETACAINE) 2 %-2 %-14 % (200 mg/sec) topical spray 1 Spray (1 Spray Topical Given 2/17/20 0752)   lidocaine (XYLOCAINE) 2 % viscous solution 15 mL (15 mL Mouth/Throat Given 2/17/20 0751)         Progress Note:  Patient clinically fluid overloaded, will give IV Lasix, patient's blood pressure is somewhat soft and will start diltiazem drip. Progress Note:  Reviewed of outside hospital records, patient did go to [facility].  Patient did give IV metoprolol, IV Cardizem and IV Lasix there. Patient reportedly drove himself here in A. fib with RVR. She will need admission, cardiology evaluation and echocardiogram.    Progress Note:  Patient still requiring 2 to 3 L of oxygen for sats above 92%, will give IV Lasix and diuresis. Consult Note:  Gwyn Vines MD spoke with Dr. Julio Mcneil,   Specialty: Cardiology  Discussed pt's hx, disposition, and available diagnostic and imaging results. Reviewed care plans. Agree with management and plan thus far. Consultant will evaluate pt. Consult Note:  Gwyn Vines MD spoke with Dr. Casa Mark,   Specialty: Hospitalist  Discussed pt's hx, disposition, and available diagnostic and imaging results. Reviewed care plans. Agree with management and plan thus far. Consultant will evaluate pt for admission.     CRITICAL CARE NOTE Denys Epley IMPENDING DETERIORATION -Airway, Respiratory, Cardiovascular, Metabolic and Renal  ASSOCIATED RISK FACTORS - Hypotension, Shock, Hypoxia, Dysrhythmia, Metabolic changes, Dehydration and Vascular Compromise  MANAGEMENT- Bedside Assessment and Supervision of Care  INTERPRETATION -  Xrays, CT Scan, ECG, Blood Pressure and Cardiac Output Measures   INTERVENTIONS - hemodynamic mngmt, Metobolic interventions and management of acute respiratory failure with hypoxia, management of A. fib with RVR requiring titration of diltiazem drip, management of respiratory distress requiring oxygen therapy and IV Lasix and frequent hemodynamic monitoring and reassessments. CASE REVIEW - Hospitalist, Nursing and Family, Cardiologist  TREATMENT RESPONSE -Improved  PERFORMED BY - Self    NOTES   :  I personally spent 70 minutes of critical care time. This is time spent at this critically ill patient's bedside actively involved in patient care as well as the coordination of care and discussions with the patient's family. This includes time involved in lab review, consultations with specialist, family decision-making, bedside attention and documentation. During this entire length of time I was immediately available to the patient. This does not include any procedural time which has been billed separately. Critical Care: The reason for providing this level of medical care for this critically-ill patient was due to a critical illness that impaired one or more vital organ systems, such that there was a high probability of imminent or life-threatening deterioration in the patient's condition. This care involved the highest level of preparedness to intervene urgently.  This care involved high complexity decision making to assess, manipulate, and support vital system functions, to treat this degree of vital organ system failure, and to prevent further life threatening deterioration of the patients condition requiring frequent assessments and interventions. Georgette Dan MD    Disposition: Admit  Patient is being admitted to the hospital. The results of their tests and reasons for their admission have been discussed with them and/or available family. I have discussed the case with the admitting specialist and expressed my high concern for decompensation if patient is discharged from the ED. The patient and/or available family convey agreement and understanding for the need to be admitted and for their admission diagnosis. Consultation has been made with the inpatient physician specialist for hospitalization. Diagnosis     Clinical Impression:   1. Acute respiratory failure with hypoxia ([address]    2. Atrial fibrillation with rapid ventricular response ([address]    3. Acute congestive heart failure, unspecified heart failure type ([address]    4. Bilateral pleural effusion    5. Respiratory distress      Attestation:  I personally performed the services described in this documentation on this date, 2/14/2020 for patient Venkat Hollingsworth. I have reviewed and verified that the information is accurate and complete. Georgette Dan MD      This note will not be viewable in [address]

## 2020-02-15 NOTE — PROGRESS NOTES
Problem: Falls - Risk of  Goal: *Absence of Falls  Description  Document Ligia Collado Fall Risk and appropriate interventions in the flowsheet.   Outcome: Progressing Towards Goal  Note: Fall Risk Interventions:            Medication Interventions: Patient to call before getting OOB                   Problem: Patient Education: Go to Patient Education Activity  Goal: Patient/Family Education  Outcome: Progressing Towards Goal     Problem: Afib Pathway: Day 1  Goal: Off Pathway (Use only if patient is Off Pathway)  Outcome: Progressing Towards Goal  Goal: Activity/Safety  Outcome: Progressing Towards Goal  Goal: Consults, if ordered  Outcome: Progressing Towards Goal  Goal: Diagnostic Test/Procedures  Outcome: Progressing Towards Goal  Goal: Nutrition/Diet  Outcome: Progressing Towards Goal  Goal: Discharge Planning  Outcome: Progressing Towards Goal  Goal: Medications  Outcome: Progressing Towards Goal  Goal: Respiratory  Outcome: Progressing Towards Goal  Goal: Treatments/Interventions/Procedures  Outcome: Progressing Towards Goal  Goal: Psychosocial  Outcome: Progressing Towards Goal  Goal: *Optimal pain control at patient's stated goal  Outcome: Progressing Towards Goal  Goal: *Hemodynamically stable  Outcome: Progressing Towards Goal  Goal: *Stable cardiac rhythm  Outcome: Progressing Towards Goal  Goal: *Lungs clear or at baseline  Outcome: Progressing Towards Goal  Goal: *Labs within defined limits  Outcome: Progressing Towards Goal  Goal: *Describes available resources and support systems  Outcome: Progressing Towards Goal

## 2020-02-15 NOTE — PROGRESS NOTES
Pharmacy Daily Dosing of Warfarin    Indication: afib, h/o DVT    Goal INR: 2-3    PTA Warfarin Dose: 5 mg daily    Concurrent anticoagulants: none    Concurrent antiplatelet: none    Major Interacting Medications   Drugs that may increase INR: none  Drugs that may decrease INR: none    Conditions that may increase/decrease INR (CHF, C. diff, cirrhosis, thyroid disorder, hypoalbuminemia): none    Labs:  Recent Labs     02/15/20  0737 02/15/20  0125 02/14/20 2012   INR 3.1*  --  2.9*   HGB  --  11.9* 13.0   PLT  --  203 225   SGOT  --  38* 40*   TBILI  --  1.1* 0.9   ALB  --  2.8* 3.2*           Impression/Plan:   INR slightly above goal  Will order warfarin 4 mg PO x 1 dose. Daily INR  CBC w/o diff QOD     Pharmacy will follow daily and adjust the dose as appropriate. Thanks  PHILIP WhartonD      [email or web address]. pdf

## 2020-02-15 NOTE — PROGRESS NOTES
80- Paged Dr. Henrine Jeans regarding pt's lung sounds. Coarse throughout, deminished bases, left lower lobe wheezing, and tachypnea. Awaiting orders. Paged Dr. Jeanne Hunt regarding pt's HR 130s-140s. Pt has been off cardizem since 10am with rate control. Cardizem was d/c'd this afternoon, awaiting call back. 1730- Received orders from Dr. Henrine Jeans, awaiting call from Dr. Jeanne Hunt.

## 2020-02-15 NOTE — CONSULTS
Consult    NAME: Waleska Oliver   :  1928   MRN:  [id number]     Date/Time:  2/15/2020 12:18 PM    Patient PCP: Wesley Reinoso MD  ________________________________________________________________________     Assessment:     1. Atrial fibrillation w/ RVR  2. Bilateral pleural effusions  3. Hypertension  4. Hyperlipidemia  5. Glaucoma  6. Prostate CA  7. 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 Echo pending  2. Off diltiazem gtt  3. Continue metoprolol; change to tartrate formulation and increase dose to 75mg q12h  4. Continue statin  5. Continue anticoagulation; consider change to NOAC  6. Will plan for AUBREY/DCCV Monday. Thank you for this consult and allowing me to take part in this patients care. Please call with questions. [x]        High complexity decision making was performed        Subjective:   CHIEF COMPLAINT: SOB    HISTORY OF PRESENT ILLNESS:     Rossi Coto is a 80 y.o.  male who \"presents from [facility], patient was having shortness of breath, tachycardia and cough with sputum. Coagulated the for about 20 years ago. Patient went to [facility] for evaluation shortness shortness of breath. Patient was seen in the office on  pneumonia, heart rate is 150 sats 96% on room air. Patient left AMA as patient wants to be closer to family, lab work BNP was 6661.08 point was negative, globin 12.9, flu a negative, chest x-ray with small bilateral pleural effusion and atelectasis, patient was given 5 mg of metoprolol at 1246, 10 mg of diltiazem at 12 9 and Lasix 20 mg at 251 additionally 25 mg oral metoprolol adds 417 additional 10 mg of Cardizem at 6 she does take metoprolol, warfarin at home.     Pt denies any other alleviating or exacerbating factors.  Additionally, pt specifically denies any recent fever, chills, headache, nausea, vomiting, abdominal pain, CP, SOB, lightheadedness, dizziness, numbness, weakness, BLE swelling, heart palpitations, urinary sxs, diarrhea, constipation, melena, hematochezia, cough, or congestion. \"      We were asked to consult for work up and evaluation of the above problems.      Past Medical History:   Diagnosis Date    Arthritis     DJD    Cancer ([facility] [address]     prostate    Chronic pain     History of colonoscopy with polypectomy 2005~    Hypercholesterolemia     Other ill-defined conditions(799.89)     cataracts    Thromboembolus ([facility] [address]     3 x left leg, 1 x right leg      Past Surgical History:   Procedure Laterality Date    HX CATARACT REMOVAL      HX MOHS PROCEDURES  1995    right    HX OTHER SURGICAL  1996    Tony filter insertion    HX PROSTATECTOMY  age 72    HX TONSILLECTOMY      TOTAL HIP ARTHROPLASTY      left    TOTAL KNEE ARTHROPLASTY      right     No Known Allergies   Meds:  See below  Social History     Tobacco Use    Smoking status: Former Smoker    Smokeless tobacco: Never Used    Tobacco comment: quit 15 yrs ago   Substance Use Topics    Alcohol use: Yes     Comment: seldom      Family History   Family history unknown: Yes       REVIEW OF SYSTEMS:     []         Unable to obtain  ROS due to ---   [x]         Total of 12 systems reviewed as follows:    Constitutional: negative fever, negative chills, negative weight loss  Eyes:   negative visual changes  ENT:   negative sore throat, tongue or lip swelling  Respiratory:  negative cough, negative dyspnea  Cards:  negative for chest pain, palpitations, lower extremity edema  GI:   negative for nausea, vomiting, diarrhea, and abdominal pain  Genitourinary: negative for frequency, dysuria  Integument:  negative for rash   Hematologic:  negative for easy bruising and gum/nose bleeding  Musculoskel: negative for myalgias,  back pain  Neurological:  negative for headaches, dizziness, vertigo, weakness  Behavl/Psych: negative for feelings of anxiety, depression     Pertinent Positives include :    Objective:      Physical Exam:    Last 24hrs VS reviewed since prior progress note. Most recent are:    Visit Vitals  /63 (BP 1 Location: Right arm, BP Patient Position: At rest)   Pulse 94   Temp 97.2 °F (36.2 °C)   Resp 22   Ht 5' 8\" (1.727 m)   Wt 90.7 kg (200 lb)   SpO2 92%   BMI 30.41 kg/m²       Intake/Output Summary (Last 24 hours) at 2/15/2020 1218  Last data filed at 2/15/2020 0945  Gross per 24 hour   Intake --   Output 1450 ml   Net -1450 ml        General Appearance: Well developed, well nourished, alert & oriented x 3,    no acute distress. Ears/Nose/Mouth/Throat: Pupils equal and round, Hearing grossly normal.  Neck: Supple. JVP within normal limits. Carotids good upstrokes, with no bruit. Chest: Lungs clear to auscultation bilaterally. Cardiovascular: Irregular rate and rhythm, S1S2 normal, no murmur, rubs, gallops. Abdomen: Soft, non-tender, bowel sounds are active. No organomegaly. Extremities: No edema bilaterally. Femoral pulses +2, Distal Pulses +1. Skin: Warm and dry. Neuro: CN II-XII grossly intact, Strength and sensation grossly intact. []         Post-cath site without hematoma, bruit, tenderness, or thrill. Distal pulses intact. Data:      Telemetry:  AF    EKG:  AF  []  No new EKG for review. Prior to Admission medications    Medication Sig Start Date End Date Taking? Authorizing Provider   warfarin sodium (COUMADIN PO) Take 7.5 mg by mouth five (5) days a week. Tuesday, Wednesday, Thursday, Saturday & Sunday   Yes Other, MD Clement   warfarin (COUMADIN) 5 mg tablet Take 5 mg by mouth every Monday. Yes Nakul, MD Clement   warfarin (COUMADIN) 5 mg tablet Take 5 mg by mouth Every Friday. Yes Other, MD Clement   vit C/E/Zn/coppr/lutein/zeaxan (PRESERVISION AREDS-2 PO) Take  by mouth daily. Yes Other, MD Clement   metoprolol succinate (TOPROL XL) 50 mg XL tablet Take 50 mg by mouth daily.    Yes Nakul, MD Clement latanoprost (XALATAN) 0.005 % ophthalmic solution Administer 1 Drop to both eyes nightly. Yes Other, MD Clement   guaiFENesin-codeine (ROBITUSSIN AC) 100-10 mg/5 mL solution Take 5 mL by mouth three (3) times daily as needed for Cough. Yes Other, MD Clement   cholecalciferol (VITAMIN D3) (1000 Units /25 mcg) tablet Take 1,000 Units by mouth daily. Yes Other, MD Clement   benzonatate (TESSALON PERLES) 100 mg capsule Take 100 mg by mouth three (3) times daily as needed for Cough. Yes Other, MD Clement   simvastatin (ZOCOR) 20 mg tablet Take 20 mg by mouth nightly. Indications: HYPERCHOLESTEROLEMIA    Provider, Historical       Recent Results (from the past 24 hour(s))   EKG, 12 LEAD, INITIAL    Collection Time: 02/14/20  7:52 PM   Result Value Ref Range    Ventricular Rate 139 BPM    Atrial Rate 139 BPM    QRS Duration 84 ms    Q-T Interval 292 ms    QTC Calculation (Bezet) 444 ms    Calculated R Axis -115 degrees    Calculated T Axis -129 degrees    Diagnosis       Supraventricular tachycardia  Right superior axis deviation  ST & T wave abnormality, consider inferior ischemia  ST & T wave abnormality, consider anterolateral ischemia  No previous ECGs available     CBC WITH AUTOMATED DIFF    Collection Time: 02/14/20  8:12 PM   Result Value Ref Range    WBC 6.9 4.1 - 11.1 K/uL    RBC 4.60 4.10 - 5.70 M/uL    HGB 13.0 12.1 - 17.0 g/dL    HCT 41.4 36.6 - 50.3 %    MCV 90.0 80.0 - 99.0 FL    MCH 28.3 26.0 - 34.0 PG    MCHC 31.4 30.0 - 36.5 g/dL    RDW 17.2 (H) 11.5 - 14.5 %    PLATELET 760 868 - 656 K/uL    MPV 9.9 8.9 - 12.9 FL    NRBC 0.0 0  WBC    ABSOLUTE NRBC 0.00 0.00 - 0.01 K/uL    NEUTROPHILS 70 32 - 75 %    LYMPHOCYTES 15 12 - 49 %    MONOCYTES 13 5 - 13 %    EOSINOPHILS 2 0 - 7 %    BASOPHILS 0 0 - 1 %    IMMATURE GRANULOCYTES 0 0.0 - 0.5 %    ABS. NEUTROPHILS 4.8 1.8 - 8.0 K/UL    ABS. LYMPHOCYTES 1.0 0.8 - 3.5 K/UL    ABS. MONOCYTES 0.9 0.0 - 1.0 K/UL    ABS. EOSINOPHILS 0.1 0.0 - 0.4 K/UL    ABS. BASOPHILS 0.0 0.0 - 0.1 K/UL    ABS. IMM. GRANS. 0.0 0.00 - 0.04 K/UL    DF AUTOMATED     METABOLIC PANEL, COMPREHENSIVE    Collection Time: 02/14/20  8:12 PM   Result Value Ref Range    Sodium 144 136 - 145 mmol/L    Potassium 4.0 3.5 - 5.1 mmol/L    Chloride 110 (H) 97 - 108 mmol/L    CO2 26 21 - 32 mmol/L    Anion gap 8 5 - 15 mmol/L    Glucose 122 (H) 65 - 100 mg/dL    BUN 24 (H) 6 - 20 MG/DL    Creatinine 1.29 0.70 - 1.30 MG/DL    BUN/Creatinine ratio 19 12 - 20      GFR est AA >60 >60 ml/min/1.73m2    GFR est non-AA 52 (L) >60 ml/min/1.73m2    Calcium 9.1 8.5 - 10.1 MG/DL    Bilirubin, total 0.9 0.2 - 1.0 MG/DL    ALT (SGPT) 45 12 - 78 U/L    AST (SGOT) 40 (H) 15 - 37 U/L    Alk.  phosphatase 84 45 - 117 U/L    Protein, total 7.0 6.4 - 8.2 g/dL    Albumin 3.2 (L) 3.5 - 5.0 g/dL    Globulin 3.8 2.0 - 4.0 g/dL    A-G Ratio 0.8 (L) 1.1 - 2.2     TROPONIN I    Collection Time: 02/14/20  8:12 PM   Result Value Ref Range    Troponin-I, Qt. <0.05 <0.05 ng/mL   MAGNESIUM    Collection Time: 02/14/20  8:12 PM   Result Value Ref Range    Magnesium 2.3 1.6 - 2.4 mg/dL   PROTHROMBIN TIME + INR    Collection Time: 02/14/20  8:12 PM   Result Value Ref Range    INR 2.9 (H) 0.9 - 1.1      Prothrombin time 28.2 (H) 9.0 - 11.1 sec   NT-PRO BNP    Collection Time: 02/14/20  8:12 PM   Result Value Ref Range    NT pro-BNP 8,680 (H) <450 PG/ML   TSH 3RD GENERATION    Collection Time: 02/14/20  8:12 PM   Result Value Ref Range    TSH 1.40 0.36 - 3.26 uIU/mL   METABOLIC PANEL, COMPREHENSIVE    Collection Time: 02/15/20  1:25 AM   Result Value Ref Range    Sodium 144 136 - 145 mmol/L    Potassium 3.7 3.5 - 5.1 mmol/L    Chloride 111 (H) 97 - 108 mmol/L    CO2 26 21 - 32 mmol/L    Anion gap 7 5 - 15 mmol/L    Glucose 108 (H) 65 - 100 mg/dL    BUN 23 (H) 6 - 20 MG/DL    Creatinine 1.11 0.70 - 1.30 MG/DL    BUN/Creatinine ratio 21 (H) 12 - 20      GFR est AA >60 >60 ml/min/1.73m2    GFR est non-AA >60 >60 ml/min/1.73m2    Calcium 9.0 8.5 - 10.1 MG/DL    Bilirubin, total 1.1 (H) 0.2 - 1.0 MG/DL    ALT (SGPT) 39 12 - 78 U/L    AST (SGOT) 38 (H) 15 - 37 U/L    Alk. phosphatase 69 45 - 117 U/L    Protein, total 6.1 (L) 6.4 - 8.2 g/dL    Albumin 2.8 (L) 3.5 - 5.0 g/dL    Globulin 3.3 2.0 - 4.0 g/dL    A-G Ratio 0.8 (L) 1.1 - 2.2     CBC WITH AUTOMATED DIFF    Collection Time: 02/15/20  1:25 AM   Result Value Ref Range    WBC 6.3 4.1 - 11.1 K/uL    RBC 4.14 4.10 - 5.70 M/uL    HGB 11.9 (L) 12.1 - 17.0 g/dL    HCT 37.1 36.6 - 50.3 %    MCV 89.6 80.0 - 99.0 FL    MCH 28.7 26.0 - 34.0 PG    MCHC 32.1 30.0 - 36.5 g/dL    RDW 16.9 (H) 11.5 - 14.5 %    PLATELET 660 563 - 346 K/uL    MPV 10.3 8.9 - 12.9 FL    NRBC 0.0 0  WBC    ABSOLUTE NRBC 0.00 0.00 - 0.01 K/uL    NEUTROPHILS 74 32 - 75 %    LYMPHOCYTES 11 (L) 12 - 49 %    MONOCYTES 12 5 - 13 %    EOSINOPHILS 2 0 - 7 %    BASOPHILS 0 0 - 1 %    IMMATURE GRANULOCYTES 1 (H) 0.0 - 0.5 %    ABS. NEUTROPHILS 4.6 1.8 - 8.0 K/UL    ABS. LYMPHOCYTES 0.7 (L) 0.8 - 3.5 K/UL    ABS. MONOCYTES 0.8 0.0 - 1.0 K/UL    ABS. EOSINOPHILS 0.1 0.0 - 0.4 K/UL    ABS. BASOPHILS 0.0 0.0 - 0.1 K/UL    ABS. IMM. GRANS. 0.1 (H) 0.00 - 0.04 K/UL    DF SMEAR SCANNED      RBC COMMENTS NORMOCYTIC, NORMOCHROMIC     TROPONIN I    Collection Time: 02/15/20  1:25 AM   Result Value Ref Range    Troponin-I, Qt. <0.05 <0.05 ng/mL   SAMPLES BEING HELD    Collection Time: 02/15/20  1:25 AM   Result Value Ref Range    SAMPLES BEING HELD SST     COMMENT        Add-on orders for these samples will be processed based on acceptable specimen integrity and analyte stability, which may vary by analyte.    TROPONIN I    Collection Time: 02/15/20  7:37 AM   Result Value Ref Range    Troponin-I, Qt. <0.05 <0.05 ng/mL   PROTHROMBIN TIME + INR    Collection Time: 02/15/20  7:37 AM   Result Value Ref Range    INR 3.1 (H) 0.9 - 1.1      Prothrombin time 30.3 (H) 9.0 - 11.1 sec        Alisson Adrian III, DO

## 2020-02-16 LAB
ALBUMIN SERPL-MCNC: 2.6 G/DL (ref 3.5–5)
ALBUMIN/GLOB SERPL: 0.8 {RATIO} (ref 1.1–2.2)
ALP SERPL-CCNC: 73 U/L (ref 45–117)
ALT SERPL-CCNC: 34 U/L (ref 12–78)
ANION GAP SERPL CALC-SCNC: 6 MMOL/L (ref 5–15)
AST SERPL-CCNC: 31 U/L (ref 15–37)
BASOPHILS # BLD: 0 K/UL (ref 0–0.1)
BASOPHILS NFR BLD: 1 % (ref 0–1)
BILIRUB SERPL-MCNC: 1.4 MG/DL (ref 0.2–1)
BUN SERPL-MCNC: 18 MG/DL (ref 6–20)
BUN/CREAT SERPL: 19 (ref 12–20)
CALCIUM SERPL-MCNC: 8.7 MG/DL (ref 8.5–10.1)
CHLORIDE SERPL-SCNC: 110 MMOL/L (ref 97–108)
CO2 SERPL-SCNC: 25 MMOL/L (ref 21–32)
CREAT SERPL-MCNC: 0.94 MG/DL (ref 0.7–1.3)
DIFFERENTIAL METHOD BLD: ABNORMAL
EOSINOPHIL # BLD: 0.2 K/UL (ref 0–0.4)
EOSINOPHIL NFR BLD: 3 % (ref 0–7)
ERYTHROCYTE [DISTWIDTH] IN BLOOD BY AUTOMATED COUNT: 16.6 % (ref 11.5–14.5)
GLOBULIN SER CALC-MCNC: 3.2 G/DL (ref 2–4)
GLUCOSE SERPL-MCNC: 103 MG/DL (ref 65–100)
HCT VFR BLD AUTO: 37.9 % (ref 36.6–50.3)
HGB BLD-MCNC: 12.1 G/DL (ref 12.1–17)
IMM GRANULOCYTES # BLD AUTO: 0 K/UL (ref 0–0.04)
IMM GRANULOCYTES NFR BLD AUTO: 1 % (ref 0–0.5)
INR PPP: 2.3 (ref 0.9–1.1)
LYMPHOCYTES # BLD: 0.8 K/UL (ref 0.8–3.5)
LYMPHOCYTES NFR BLD: 14 % (ref 12–49)
MCH RBC QN AUTO: 28.6 PG (ref 26–34)
MCHC RBC AUTO-ENTMCNC: 31.9 G/DL (ref 30–36.5)
MCV RBC AUTO: 89.6 FL (ref 80–99)
MONOCYTES # BLD: 0.8 K/UL (ref 0–1)
MONOCYTES NFR BLD: 13 % (ref 5–13)
NEUTS SEG # BLD: 4 K/UL (ref 1.8–8)
NEUTS SEG NFR BLD: 68 % (ref 32–75)
NRBC # BLD: 0 K/UL (ref 0–0.01)
NRBC BLD-RTO: 0 PER 100 WBC
PLATELET # BLD AUTO: 181 K/UL (ref 150–400)
PMV BLD AUTO: 9.7 FL (ref 8.9–12.9)
POTASSIUM SERPL-SCNC: 3.4 MMOL/L (ref 3.5–5.1)
PROT SERPL-MCNC: 5.8 G/DL (ref 6.4–8.2)
PROTHROMBIN TIME: 22.6 SEC (ref 9–11.1)
RBC # BLD AUTO: 4.23 M/UL (ref 4.1–5.7)
SODIUM SERPL-SCNC: 141 MMOL/L (ref 136–145)
WBC # BLD AUTO: 5.8 K/UL (ref 4.1–11.1)

## 2020-02-16 PROCEDURE — 85610 PROTHROMBIN TIME: CPT

## 2020-02-16 PROCEDURE — 74011250637 HC RX REV CODE- 250/637: Performed by: INTERNAL MEDICINE

## 2020-02-16 PROCEDURE — 93005 ELECTROCARDIOGRAM TRACING: CPT

## 2020-02-16 PROCEDURE — 85025 COMPLETE CBC W/AUTO DIFF WBC: CPT

## 2020-02-16 PROCEDURE — 74011250636 HC RX REV CODE- 250/636: Performed by: INTERNAL MEDICINE

## 2020-02-16 PROCEDURE — 36415 COLL VENOUS BLD VENIPUNCTURE: CPT

## 2020-02-16 PROCEDURE — 65660000000 HC RM CCU STEPDOWN

## 2020-02-16 PROCEDURE — 80053 COMPREHEN METABOLIC PANEL: CPT

## 2020-02-16 PROCEDURE — 74011000250 HC RX REV CODE- 250: Performed by: INTERNAL MEDICINE

## 2020-02-16 RX ORDER — WARFARIN SODIUM 5 MG/1
5 TABLET ORAL EVERY EVENING
Status: COMPLETED | OUTPATIENT
Start: 2020-02-16 | End: 2020-02-16

## 2020-02-16 RX ORDER — AMIODARONE HYDROCHLORIDE 200 MG/1
400 TABLET ORAL EVERY 8 HOURS
Status: DISCONTINUED | OUTPATIENT
Start: 2020-02-16 | End: 2020-02-17 | Stop reason: HOSPADM

## 2020-02-16 RX ORDER — METOPROLOL TARTRATE 50 MG/1
100 TABLET ORAL EVERY 12 HOURS
Status: DISCONTINUED | OUTPATIENT
Start: 2020-02-16 | End: 2020-02-17 | Stop reason: HOSPADM

## 2020-02-16 RX ORDER — METOPROLOL TARTRATE 25 MG/1
25 TABLET, FILM COATED ORAL ONCE
Status: COMPLETED | OUTPATIENT
Start: 2020-02-16 | End: 2020-02-16

## 2020-02-16 RX ADMIN — METOPROLOL TARTRATE 25 MG: 25 TABLET ORAL at 12:11

## 2020-02-16 RX ADMIN — ATORVASTATIN CALCIUM 20 MG: 20 TABLET, FILM COATED ORAL at 08:17

## 2020-02-16 RX ADMIN — DEXTROSE MONOHYDRATE 7.5 MG/HR: 50 INJECTION, SOLUTION INTRAVENOUS at 01:41

## 2020-02-16 RX ADMIN — Medication 10 ML: at 21:34

## 2020-02-16 RX ADMIN — AMIODARONE HYDROCHLORIDE 0.5 MG/MIN: 50 INJECTION, SOLUTION INTRAVENOUS at 21:33

## 2020-02-16 RX ADMIN — AMIODARONE HYDROCHLORIDE 400 MG: 200 TABLET ORAL at 12:11

## 2020-02-16 RX ADMIN — METOPROLOL TARTRATE 100 MG: 50 TABLET, FILM COATED ORAL at 17:51

## 2020-02-16 RX ADMIN — METOPROLOL TARTRATE 75 MG: 50 TABLET, FILM COATED ORAL at 08:17

## 2020-02-16 RX ADMIN — METOPROLOL TARTRATE 100 MG: 50 TABLET, FILM COATED ORAL at 21:33

## 2020-02-16 RX ADMIN — AMIODARONE HYDROCHLORIDE 1 MG/MIN: 50 INJECTION, SOLUTION INTRAVENOUS at 15:43

## 2020-02-16 RX ADMIN — AMIODARONE HYDROCHLORIDE 150 MG: 1.5 INJECTION, SOLUTION INTRAVENOUS at 15:28

## 2020-02-16 RX ADMIN — LATANOPROST 1 DROP: 50 SOLUTION OPHTHALMIC at 21:34

## 2020-02-16 RX ADMIN — Medication 5 ML: at 05:47

## 2020-02-16 RX ADMIN — AMIODARONE HYDROCHLORIDE 400 MG: 200 TABLET ORAL at 21:33

## 2020-02-16 RX ADMIN — Medication 10 ML: at 13:56

## 2020-02-16 RX ADMIN — WARFARIN SODIUM 5 MG: 5 TABLET ORAL at 17:09

## 2020-02-16 NOTE — PROGRESS NOTES
Progress Note      2/16/2020 11:07 AM  NAME: Willy Tellez   MRN:  [id number]   Admit Diagnosis: Atrial fibrillation with rapid ventricular response ([facility] [address] [I48.91]      Problem List:     1. Atrial fibrillation w/ RVR  2. Bilateral pleural effusions  3. Hypertension  4. Hyperlipidemia  5. Glaucoma  6. Prostate CA  7. 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 Stop diltiazem gtt  2. Continue metoprolol tartrate formulation and increase dose to 100mg q12h  3. Continue statin  4. Continue anticoagulation; consider change to NOAC  5. EKG now  6. Will need a repeat echo as an outpt in SR to see if EF improves out of AF  7. Start amio 400mg q8h x 5d, then q12h x 5d, then QDay x 5d, then to 200mg daily. []       High complexity decision making was performed in this patient at high risk for decompensation with multiple organ involvement. Subjective:     Willy Tellez denies chest pain, dyspnea. Discussed with RN events overnight. Review of Systems:    Symptom Y/N Comments  Symptom Y/N Comments   Fever/Chills N   Chest Pain N    Poor Appetite N   Edema N    Cough N   Abdominal Pain N    Sputum N   Joint Pain N    SOB/MOMIN N   Pruritis/Rash N    Nausea/vomit N   Tolerating PT/OT Y    Diarrhea N   Tolerating Diet Y    Constipation N   Other       Could NOT obtain due to:      Objective:      Physical Exam:    Last 24hrs VS reviewed since prior progress note.  Most recent are:    Visit Vitals  /83   Pulse 72   Temp 97.9 °F (36.6 °C)   Resp 19   Ht 5' 8\" (1.727 m)   Wt 87.9 kg (193 lb 12.6 oz)   SpO2 95%   BMI 29.46 kg/m²       Intake/Output Summary (Last 24 hours) at 2/16/2020 1107  Last data filed at 2/16/2020 0320  Gross per 24 hour   Intake 213.12 ml   Output 1700 ml   Net -1486.88 ml        General Appearance: Well developed, well nourished, alert & oriented x 3,    no acute distress. Ears/Nose/Mouth/Throat: Hearing grossly normal.  Neck: Supple. Chest: Lungs clear to auscultation bilaterally. Cardiovascular: Regular rate and rhythm, S1S2 normal, no murmur. Abdomen: Soft, non-tender, bowel sounds are active. Extremities: No edema bilaterally. Skin: Warm and dry. []         Post-cath site without hematoma, bruit, tenderness, or thrill. Distal pulses intact. PMH/SH reviewed - no change compared to H&P    Data Review    Telemetry: sinus rhythm     EKG:   [x]  No new EKG for review    Lab Data Personally Reviewed:    Recent Labs     02/16/20  0451 02/15/20  0125   WBC 5.8 6.3   HGB 12.1 11.9*   HCT 37.9 37.1    203     Recent Labs     02/16/20  0451 02/15/20  0737 02/14/20 2012   INR 2.3* 3.1* 2.9*   PTP 22.6* 30.3* 28.2*      Recent Labs     02/16/20  0451 02/15/20  0125 02/14/20 2012    144 144   K 3.4* 3.7 4.0   * 111* 110*   CO2 25 26 26   BUN 18 23* 24*   CREA 0.94 1.11 1.29   * 108* 122*   CA 8.7 9.0 9.1   MG  --   --  2.3     Recent Labs     02/15/20  0737 02/15/20  0125 02/14/20  2012   TROIQ <0.05 <0.05 <0.05     No results found for: CHOL, CHOLX, CHLST, CHOLV, HDL, HDLP, LDL, LDLC, DLDLP, Spencer Neal, CHHD, CHHDX    Recent Labs     02/16/20  0451 02/15/20  0125 02/14/20  2012   SGOT 31 38* 40*   AP 73 69 84   TP 5.8* 6.1* 7.0   ALB 2.6* 2.8* 3.2*   GLOB 3.2 3.3 3.8     No results for input(s): PH, PCO2, PO2 in the last 72 hours.     Medications Personally Reviewed:    Current Facility-Administered Medications   Medication Dose Route Frequency    metoprolol tartrate (LOPRESSOR) tablet 100 mg  100 mg Oral Q12H    metoprolol tartrate (LOPRESSOR) tablet 25 mg  25 mg Oral ONCE    amiodarone (CORDARONE) tablet 400 mg  400 mg Oral Q8H    acetaminophen (TYLENOL) tablet 650 mg  650 mg Oral Q6H PRN    sodium chloride (NS) flush 5-40 mL  5-40 mL IntraVENous Q8H  sodium chloride (NS) flush 5-40 mL  5-40 mL IntraVENous PRN    ondansetron (ZOFRAN) injection 4 mg  4 mg IntraVENous Q6H PRN    atorvastatin (LIPITOR) tablet 20 mg  20 mg Oral DAILY    benzonatate (TESSALON) capsule 100 mg  100 mg Oral TID PRN    latanoprost (XALATAN) 0.005 % ophthalmic solution 1 Drop  1 Drop Both Eyes QHS    warfarin (COUMADIN) tablet 4 mg  4 mg Oral QPM         Yanira Roger III, DO

## 2020-02-16 NOTE — PROGRESS NOTES
Pharmacy Daily Dosing of Warfarin    Indication: afib, h/o DVT    Goal INR: 2-3    PTA Warfarin Dose: 5 mg daily per patient    Concurrent anticoagulants: none    Concurrent antiplatelet: none    Major Interacting Medications   Drugs that may increase INR: amiodarone  Drugs that may decrease INR: none    Conditions that may increase/decrease INR (CHF, C. diff, cirrhosis, thyroid disorder, hypoalbuminemia): none    Labs:  Recent Labs     02/16/20  0451 02/15/20  0737 02/15/20  0125 02/14/20 2012   INR 2.3* 3.1*  --  2.9*   HGB 12.1  --  11.9* 13.0     --  203 225   SGOT 31  --  38* 40*   TBILI 1.4*  --  1.1* 0.9   ALB 2.6*  --  2.8* 3.2*           Impression/Plan:   Amiodarone started today, may increase INR, will have to watch closely  Will order warfarin 5 mg PO tonight  Daily INR  CBC w/o diff QOD     Pharmacy will follow daily and adjust the dose as appropriate. Thanks  Divya Bhatia PHARMD      [email or web address]/Warfarin%[email or web address]. pdf

## 2020-02-16 NOTE — PROGRESS NOTES
Hospitalist Progress Note    NAME: Waleska Oliver   :  1928   MRN:  [id number]       Assessment / Plan:    Atrial fibrillation with RVR  -Admited patient to stepdown,cont  - off Cardizem drip  -Follow-up serial troponin  -TSH 1.4  -echocardiogram Estimated left ventricular ejection fraction is 40 - 45%  -Cardiology following \"to start BB, amiodarone\"     Acute CHF with HFrEF  Bilateral pleural effusions and interstitial edema  -echocardiogram Estimated left ventricular ejection fraction is 40 - 45%  -daily weight, I&Os  -started patient on lasix, off now given BP on lower side  - UP 2.6 L in the last 24 hr  -cont BB as per cardiology recommendations     Hypertension  -continue home antihypertensive medication     Hyperlipidemia-continue Zocor 20 mg daily     Glucoma  -Continue Lantus processed         HX DVT  -Continue Coumadin as per pharmacy dose    25.0 - 29.9 Overweight / Body mass index is 29.46 kg/m². Code status: Full  Prophylaxis: Hep SQ  Recommended Disposition: Home w/Family     Subjective:     Chief Complaint / Reason for Physician Visit  A-fib with RVR  Discussed with RN events overnight. He was having lunch, said \" I am feeling great\"    Review of Systems:  Symptom Y/N Comments  Symptom Y/N Comments   Fever/Chills    Chest Pain n    Poor Appetite    Edema n    Cough    Abdominal Pain     Sputum n   Joint Pain     SOB/MOMIN n   Pruritis/Rash     Nausea/vomit n   Tolerating PT/OT     Diarrhea    Tolerating Diet y    Constipation    Other             Objective:     VITALS:   Last 24hrs VS reviewed since prior progress note.  Most recent are:  Patient Vitals for the past 24 hrs:   Temp Pulse Resp BP SpO2   20 0700 97.9 °F (36.6 °C) 72 19 134/83 95 %   20 0659 -- 70 -- -- 95 %   20 0300 98 °F (36.7 °C) 72 22 126/76 95 %   20 0200 -- 70 -- 126/76 96 %   20 0100 -- -- -- 114/66 --   20 0000 -- -- -- 145/57 --   02/15/20 2338 97.7 °F (36.5 °C) 93 21 116/67 92 % 02/15/20 1910 98.1 °F (36.7 °C) (!) 115 25 131/64 96 %   02/15/20 1905 -- (!) 124 (!) 38 128/79 --   02/15/20 1900 -- -- -- 101/66 --   02/15/20 1800 -- (!) 123 -- 122/70 --   02/15/20 1735 97.6 °F (36.4 °C) (!) 132 28 128/84 94 %   02/15/20 1434 97.5 °F (36.4 °C) 98 17 107/50 92 %   02/15/20 1134 97.2 °F (36.2 °C) 94 22 119/63 92 %       Intake/Output Summary (Last 24 hours) at 2/16/2020 1113  Last data filed at 2/16/2020 0320  Gross per 24 hour   Intake 213.12 ml   Output 1700 ml   Net -1486.88 ml        PHYSICAL EXAM:  General: WD, WN. Alert, cooperative, no acute distress    EENT:  EOMI. Anicteric sclerae. MMM  Resp:  CTA bilaterally, no wheezing or rales. No accessory muscle use  CV:  Regular  rhythm,  No edema  GI:  Soft, Non distended, Non tender.  +Bowel sounds  Neurologic:  Alert and oriented X 3, normal speech,   Psych:   Good insight. Not anxious nor agitated  Skin:  No rashes. No jaundice    Reviewed most current lab test results and cultures  YES  Reviewed most current radiology test results   YES  Review and summation of old records today    NO  Reviewed patient's current orders and MAR    YES  PMH/SH reviewed - no change compared to H&P  ________________________________________________________________________  Care Plan discussed with:    Comments   Patient x    Family      RN x    Care Manager     Consultant                        Multidiciplinary team rounds were held today with , nursing, pharmacist and clinical coordinator. Patient's plan of care was discussed; medications were reviewed and discharge planning was addressed.      ________________________________________________________________________  Total NON critical care TIME:  35  Minutes    Total CRITICAL CARE TIME Spent:   Minutes non procedure based      Comments   >50% of visit spent in counseling and coordination of care     ________________________________________________________________________  Bill Weiss MD Procedures: see electronic medical records for all procedures/Xrays and details which were not copied into this note but were reviewed prior to creation of Plan. LABS:  I reviewed today's most current labs and imaging studies.   Pertinent labs include:  Recent Labs     02/16/20  0451 02/15/20  0125 02/14/20  2012   WBC 5.8 6.3 6.9   HGB 12.1 11.9* 13.0   HCT 37.9 37.1 41.4    203 225     Recent Labs     02/16/20  0451 02/15/20  0737 02/15/20  0125 02/14/20  2012     --  144 144   K 3.4*  --  3.7 4.0   *  --  111* 110*   CO2 25  --  26 26   *  --  108* 122*   BUN 18  --  23* 24*   CREA 0.94  --  1.11 1.29   CA 8.7  --  9.0 9.1   MG  --   --   --  2.3   ALB 2.6*  --  2.8* 3.2*   TBILI 1.4*  --  1.1* 0.9   SGOT 31  --  38* 40*   ALT 34  --  39 45   INR 2.3* 3.1*  --  2.9*       Signed: Yamile Mcneill MD

## 2020-02-16 NOTE — PROGRESS NOTES
Problem: Falls - Risk of  Goal: *Absence of Falls  Description  Document Amber Carter Fall Risk and appropriate interventions in the flowsheet.   Outcome: Progressing Towards Goal  Note: Fall Risk Interventions:            Medication Interventions: Patient to call before getting OOB                   Problem: Patient Education: Go to Patient Education Activity  Goal: Patient/Family Education  Outcome: Progressing Towards Goal     Problem: Patient Education: Go to Patient Education Activity  Goal: Patient/Family Education  Outcome: Progressing Towards Goal     Problem: Afib Pathway: Day 1  Goal: Off Pathway (Use only if patient is Off Pathway)  Outcome: Progressing Towards Goal  Goal: Activity/Safety  Outcome: Progressing Towards Goal  Goal: Consults, if ordered  Outcome: Progressing Towards Goal  Goal: Diagnostic Test/Procedures  Outcome: Progressing Towards Goal  Goal: Nutrition/Diet  Outcome: Progressing Towards Goal  Goal: Discharge Planning  Outcome: Progressing Towards Goal  Goal: Medications  Outcome: Progressing Towards Goal  Goal: Respiratory  Outcome: Progressing Towards Goal  Goal: Treatments/Interventions/Procedures  Outcome: Progressing Towards Goal  Goal: Psychosocial  Outcome: Progressing Towards Goal  Goal: *Optimal pain control at patient's stated goal  Outcome: Progressing Towards Goal  Goal: *Hemodynamically stable  Outcome: Progressing Towards Goal  Goal: *Stable cardiac rhythm  Outcome: Progressing Towards Goal  Goal: *Lungs clear or at baseline  Outcome: Progressing Towards Goal  Goal: *Labs within defined limits  Outcome: Progressing Towards Goal  Goal: *Describes available resources and support systems  Outcome: Progressing Towards Goal

## 2020-02-16 NOTE — PROGRESS NOTES
1100- Pt in NSR, dilt drip discontinued per Dr. Danial Wallace. 1438- While taking VS noticed pt back in afib/aflutter. Pt converted back to afib/aflutter per tele monitor at 1324. Will page Dr. Danial Wallace.

## 2020-02-16 NOTE — PROGRESS NOTES
1100- Pt in NSR, dilt drip discontinued per Dr. Ardienne Portillo. 1438- While taking VS noticed pt back in afib/aflutter. Pt converted back to afib/aflutter per tele monitor at 1324. Will page Dr. Adrienne Portillo. 1528- PT started on amio bolus and will follow with drip.  
 
1721- Paged  Dr. Adrienne Portillo regarding HR staying in 130s.

## 2020-02-16 NOTE — ROUTINE PROCESS
..Report received from  Jenni Martinez, [address] [place] [address]. SBAR was discussed.     Rich Newman RN

## 2020-02-17 ENCOUNTER — APPOINTMENT (OUTPATIENT)
Dept: NON INVASIVE DIAGNOSTICS | Age: 85
DRG: 308 | End: 2020-02-17
Attending: INTERNAL MEDICINE
Payer: MEDICARE

## 2020-02-17 VITALS
WEIGHT: 195.55 LBS | BODY MASS INDEX: 29.64 KG/M2 | DIASTOLIC BLOOD PRESSURE: 82 MMHG | RESPIRATION RATE: 27 BRPM | SYSTOLIC BLOOD PRESSURE: 124 MMHG | OXYGEN SATURATION: 98 % | HEART RATE: 76 BPM | HEIGHT: 68 IN | TEMPERATURE: 98 F

## 2020-02-17 PROBLEM — I48.91 ATRIAL FIBRILLATION WITH RAPID VENTRICULAR RESPONSE (HCC): Status: RESOLVED | Noted: 2020-02-15 | Resolved: 2020-02-17

## 2020-02-17 LAB
ALBUMIN SERPL-MCNC: 2.7 G/DL (ref 3.5–5)
ALBUMIN/GLOB SERPL: 0.8 {RATIO} (ref 1.1–2.2)
ALP SERPL-CCNC: 84 U/L (ref 45–117)
ALT SERPL-CCNC: 36 U/L (ref 12–78)
ANION GAP SERPL CALC-SCNC: 7 MMOL/L (ref 5–15)
AST SERPL-CCNC: 33 U/L (ref 15–37)
ATRIAL RATE: 76 BPM
BASOPHILS # BLD: 0 K/UL (ref 0–0.1)
BASOPHILS NFR BLD: 0 % (ref 0–1)
BILIRUB SERPL-MCNC: 1.1 MG/DL (ref 0.2–1)
BUN SERPL-MCNC: 22 MG/DL (ref 6–20)
BUN/CREAT SERPL: 19 (ref 12–20)
CALCIUM SERPL-MCNC: 8.9 MG/DL (ref 8.5–10.1)
CALCULATED P AXIS, ECG09: 33 DEGREES
CALCULATED R AXIS, ECG10: -56 DEGREES
CALCULATED R AXIS, ECG10: 135 DEGREES
CALCULATED R AXIS, ECG10: 170 DEGREES
CALCULATED T AXIS, ECG11: -108 DEGREES
CALCULATED T AXIS, ECG11: -151 DEGREES
CALCULATED T AXIS, ECG11: -169 DEGREES
CHLORIDE SERPL-SCNC: 108 MMOL/L (ref 97–108)
CO2 SERPL-SCNC: 23 MMOL/L (ref 21–32)
CREAT SERPL-MCNC: 1.15 MG/DL (ref 0.7–1.3)
DIAGNOSIS, 93000: NORMAL
DIFFERENTIAL METHOD BLD: ABNORMAL
EOSINOPHIL # BLD: 0.1 K/UL (ref 0–0.4)
EOSINOPHIL NFR BLD: 1 % (ref 0–7)
ERYTHROCYTE [DISTWIDTH] IN BLOOD BY AUTOMATED COUNT: 17.2 % (ref 11.5–14.5)
GLOBULIN SER CALC-MCNC: 3.3 G/DL (ref 2–4)
GLUCOSE SERPL-MCNC: 110 MG/DL (ref 65–100)
HCT VFR BLD AUTO: 40.3 % (ref 36.6–50.3)
HGB BLD-MCNC: 12.7 G/DL (ref 12.1–17)
IMM GRANULOCYTES # BLD AUTO: 0 K/UL (ref 0–0.04)
IMM GRANULOCYTES NFR BLD AUTO: 0 % (ref 0–0.5)
INR PPP: 2.2 (ref 0.9–1.1)
LYMPHOCYTES # BLD: 1.2 K/UL (ref 0.8–3.5)
LYMPHOCYTES NFR BLD: 14 % (ref 12–49)
MCH RBC QN AUTO: 28.7 PG (ref 26–34)
MCHC RBC AUTO-ENTMCNC: 31.5 G/DL (ref 30–36.5)
MCV RBC AUTO: 91.2 FL (ref 80–99)
MONOCYTES # BLD: 1.1 K/UL (ref 0–1)
MONOCYTES NFR BLD: 13 % (ref 5–13)
NEUTS SEG # BLD: 6 K/UL (ref 1.8–8)
NEUTS SEG NFR BLD: 72 % (ref 32–75)
NRBC # BLD: 0 K/UL (ref 0–0.01)
NRBC BLD-RTO: 0 PER 100 WBC
P-R INTERVAL, ECG05: 186 MS
PLATELET # BLD AUTO: 179 K/UL (ref 150–400)
PMV BLD AUTO: 10.1 FL (ref 8.9–12.9)
POTASSIUM SERPL-SCNC: 3.8 MMOL/L (ref 3.5–5.1)
PROT SERPL-MCNC: 6 G/DL (ref 6.4–8.2)
PROTHROMBIN TIME: 21.3 SEC (ref 9–11.1)
Q-T INTERVAL, ECG07: 334 MS
Q-T INTERVAL, ECG07: 428 MS
Q-T INTERVAL, ECG07: 428 MS
QRS DURATION, ECG06: 94 MS
QRS DURATION, ECG06: 96 MS
QRS DURATION, ECG06: 98 MS
QTC CALCULATION (BEZET), ECG08: 481 MS
QTC CALCULATION (BEZET), ECG08: 482 MS
QTC CALCULATION (BEZET), ECG08: 517 MS
RBC # BLD AUTO: 4.42 M/UL (ref 4.1–5.7)
SODIUM SERPL-SCNC: 138 MMOL/L (ref 136–145)
VENTRICULAR RATE, ECG03: 125 BPM
VENTRICULAR RATE, ECG03: 76 BPM
VENTRICULAR RATE, ECG03: 88 BPM
WBC # BLD AUTO: 8.4 K/UL (ref 4.1–11.1)

## 2020-02-17 PROCEDURE — 5A2204Z RESTORATION OF CARDIAC RHYTHM, SINGLE: ICD-10-PCS | Performed by: INTERNAL MEDICINE

## 2020-02-17 PROCEDURE — C8925 2D TEE W OR W/O FOL W/CON,IN: HCPCS

## 2020-02-17 PROCEDURE — 74011250637 HC RX REV CODE- 250/637: Performed by: INTERNAL MEDICINE

## 2020-02-17 PROCEDURE — 36415 COLL VENOUS BLD VENIPUNCTURE: CPT

## 2020-02-17 PROCEDURE — 93005 ELECTROCARDIOGRAM TRACING: CPT

## 2020-02-17 PROCEDURE — 74011250636 HC RX REV CODE- 250/636: Performed by: INTERNAL MEDICINE

## 2020-02-17 PROCEDURE — 85025 COMPLETE CBC W/AUTO DIFF WBC: CPT

## 2020-02-17 PROCEDURE — 99152 MOD SED SAME PHYS/QHP 5/>YRS: CPT

## 2020-02-17 PROCEDURE — 74011000250 HC RX REV CODE- 250: Performed by: INTERNAL MEDICINE

## 2020-02-17 PROCEDURE — 77030018729 HC ELECTRD DEFIB PAD CARD -B

## 2020-02-17 PROCEDURE — 85610 PROTHROMBIN TIME: CPT

## 2020-02-17 PROCEDURE — B246ZZ4 ULTRASONOGRAPHY OF RIGHT AND LEFT HEART, TRANSESOPHAGEAL: ICD-10-PCS | Performed by: INTERNAL MEDICINE

## 2020-02-17 PROCEDURE — 80053 COMPREHEN METABOLIC PANEL: CPT

## 2020-02-17 PROCEDURE — 77010033678 HC OXYGEN DAILY

## 2020-02-17 RX ORDER — ATORVASTATIN CALCIUM 20 MG/1
20 TABLET, FILM COATED ORAL DAILY
Qty: 90 TAB | Refills: 0 | Status: SHIPPED | OUTPATIENT
Start: 2020-02-17 | End: 2022-09-30

## 2020-02-17 RX ORDER — FENTANYL CITRATE 50 UG/ML
25-50 INJECTION, SOLUTION INTRAMUSCULAR; INTRAVENOUS
Status: DISCONTINUED | OUTPATIENT
Start: 2020-02-17 | End: 2020-02-17

## 2020-02-17 RX ORDER — LIDOCAINE HYDROCHLORIDE 20 MG/ML
15 SOLUTION OROPHARYNGEAL ONCE
Status: COMPLETED | OUTPATIENT
Start: 2020-02-17 | End: 2020-02-17

## 2020-02-17 RX ORDER — AMIODARONE HYDROCHLORIDE 200 MG/1
TABLET ORAL
Qty: 90 TAB | Refills: 1 | Status: SHIPPED | OUTPATIENT
Start: 2020-02-17 | End: 2021-04-12

## 2020-02-17 RX ORDER — METOPROLOL TARTRATE 100 MG/1
100 TABLET ORAL EVERY 12 HOURS
Qty: 90 TAB | Refills: 1 | Status: SHIPPED | OUTPATIENT
Start: 2020-02-17 | End: 2021-04-12

## 2020-02-17 RX ORDER — MIDAZOLAM HYDROCHLORIDE 1 MG/ML
.5-1 INJECTION, SOLUTION INTRAMUSCULAR; INTRAVENOUS
Status: DISCONTINUED | OUTPATIENT
Start: 2020-02-17 | End: 2020-02-17

## 2020-02-17 RX ADMIN — Medication 10 ML: at 05:47

## 2020-02-17 RX ADMIN — AMIODARONE HYDROCHLORIDE 400 MG: 200 TABLET ORAL at 05:47

## 2020-02-17 RX ADMIN — MIDAZOLAM 1 MG: 1 INJECTION INTRAMUSCULAR; INTRAVENOUS at 08:00

## 2020-02-17 RX ADMIN — MIDAZOLAM 1 MG: 1 INJECTION INTRAMUSCULAR; INTRAVENOUS at 07:54

## 2020-02-17 RX ADMIN — ATORVASTATIN CALCIUM 20 MG: 20 TABLET, FILM COATED ORAL at 10:33

## 2020-02-17 RX ADMIN — LIDOCAINE HYDROCHLORIDE 15 ML: 20 SOLUTION ORAL; TOPICAL at 07:51

## 2020-02-17 RX ADMIN — FENTANYL CITRATE 25 MCG: 50 INJECTION, SOLUTION INTRAMUSCULAR; INTRAVENOUS at 07:56

## 2020-02-17 RX ADMIN — METOPROLOL TARTRATE 100 MG: 50 TABLET, FILM COATED ORAL at 10:33

## 2020-02-17 RX ADMIN — MIDAZOLAM 1 MG: 1 INJECTION INTRAMUSCULAR; INTRAVENOUS at 08:06

## 2020-02-17 RX ADMIN — BENZOCAINE, BUTAMBEN, AND TETRACAINE HYDROCHLORIDE 1 SPRAY: .028; .004; .004 AEROSOL, SPRAY TOPICAL at 07:52

## 2020-02-17 NOTE — PROGRESS NOTES
Pt sedated with 2mg Versed and 25mcg Fentanyl for AUBREY (monitored sedation from 0753 to 0809)    Pt sedated with an additional 1mg Versed, given 1 synchronized shock(s) at 360 Joules, AFib converted to NSR w/ PAC's.(monitored sedation from 0753 to 0809)        Post cardioversion EKG completed

## 2020-02-17 NOTE — PROGRESS NOTES
Problem: Falls - Risk of  Goal: *Absence of Falls  Description  Document Aida Barr Fall Risk and appropriate interventions in the flowsheet.   Outcome: Progressing Towards Goal  Note: Fall Risk Interventions:            Medication Interventions: Patient to call before getting OOB                   Problem: Patient Education: Go to Patient Education Activity  Goal: Patient/Family Education  Outcome: Progressing Towards Goal     Problem: Patient Education: Go to Patient Education Activity  Goal: Patient/Family Education  Outcome: Progressing Towards Goal     Problem: Afib Pathway: Day 1  Goal: Off Pathway (Use only if patient is Off Pathway)  Outcome: Progressing Towards Goal  Goal: Activity/Safety  Outcome: Progressing Towards Goal  Goal: Consults, if ordered  Outcome: Progressing Towards Goal  Goal: Diagnostic Test/Procedures  Outcome: Progressing Towards Goal  Goal: Nutrition/Diet  Outcome: Progressing Towards Goal  Goal: Discharge Planning  Outcome: Progressing Towards Goal  Goal: Medications  Outcome: Progressing Towards Goal  Goal: Respiratory  Outcome: Progressing Towards Goal  Goal: Treatments/Interventions/Procedures  Outcome: Progressing Towards Goal  Goal: Psychosocial  Outcome: Progressing Towards Goal  Goal: *Optimal pain control at patient's stated goal  Outcome: Progressing Towards Goal  Goal: *Hemodynamically stable  Outcome: Progressing Towards Goal  Goal: *Stable cardiac rhythm  Outcome: Progressing Towards Goal  Goal: *Lungs clear or at baseline  Outcome: Progressing Towards Goal  Goal: *Labs within defined limits  Outcome: Progressing Towards Goal  Goal: *Describes available resources and support systems  Outcome: Progressing Towards Goal

## 2020-02-17 NOTE — PROGRESS NOTES
Bedside and Verbal shift change report given to Linda Coffey (oncoming nurse) by Sharon Vigil (offgoing nurse). Report included the following information SBAR, Kardex, Procedure Summary, Intake/Output, MAR and Recent Results. 0720: EKG done at bedside    0730: Patient off unit for cardioversion    0840: Report received from [place] on patient coming back from procedure    0900: Patient arrived to room, amio drip infusing. Vital signs stable. Dr. Zara Rizzo at bedside to talk with patient    0945: Dr. Dominga Zabala at bedside to talk with patient and patient's family. Discharge orders placed, prescriptions signed and given to family    0313 [id number]: Patient sitting up in bed eating breakfast, family at bedside    1130: Patient telemetry removed, patient up ambulating in room, dressed now. Ready for discharge    1215: Discharge instructions reviewed with patient and patient's family, all questions answered.     1235: Patient left via wheelchair for discharge home with family

## 2020-02-17 NOTE — DISCHARGE INSTRUCTIONS
AFTER YOUR TRANSESOPHAGEAL ECHOCARDIOGRAM    Be sure someone else drives you home; do not drive today. You may feel drowsy for several hours. Do not eat or drink for at least two hours after your procedure. Your throat will be numb and there is a risk you might have difficulty swallowing for a while. Be careful when you do eat or drink for the first time especially with hot fluids since you could easily burn your throat. Call your doctor if:    · You are bleeding from your throat or mouth. · You have trouble breathing all of a sudden. · You have chest pain or any pain that spreads to your neck, jaw, or arms. · You have questions or concerns. · You have a fever greater than 101°F.    Doctor: Kenneth Alcantara    Special Instructions:    No driving for 24 hours. Discharge Medications:   {Medication reconciliation information is now added to the patient's AVS automatically when it is printed. There is no need to use this SmartLink in discharge instructions.   Highlight this text and delete it to clear this message}

## 2020-02-17 NOTE — PROGRESS NOTES
Pharmacy Daily Dosing of Warfarin    Indication: afib, h/o DVT    Goal INR: 2-3    PTA Warfarin Dose: 5 mg daily per patient    Concurrent anticoagulants: none    Concurrent antiplatelet: none    Major Interacting Medications   Drugs that may increase INR: amiodarone  Drugs that may decrease INR: none    Conditions that may increase/decrease INR (CHF, C. diff, cirrhosis, thyroid disorder, hypoalbuminemia): none    Labs:  Recent Labs     02/17/20  0538 02/16/20  0451 02/15/20  0737 02/15/20  0125   INR 2.2* 2.3* 3.1*  --    HGB 12.7 12.1  --  11.9*    181  --  203   SGOT 33 31  --  38*   TBILI 1.1* 1.4*  --  1.1*   ALB 2.7* 2.6*  --  2.8*       Impression/Plan:   Amiodarone started on 2/16 and may increase INR, will have to monitor the INR closely  Will order warfarin 3 mg PO tonight, since the patient is on Amiodarone  Noted Dr. Murlilo Coad note to change to DOAC. Will discuss with the attending  Daily INR  CBC w/o diff QOD     Pharmacy will follow daily and adjust the dose as appropriate. Thanks  PHILIP TysonD      [email or web address]%[email or web address]. pdf

## 2020-02-17 NOTE — PROGRESS NOTES
Brief Procedure Note    Patient: Bobbi Virgen MRN: [id number]  SSN: [id number]    YOB: 1928  Age: 80 y.o. Sex: male      Date of Procedure: 2/17/2020     Pre-procedure Diagnosis: AFib    Post-procedure Diagnosis: Moderately reduced LVEF, no sig valve dz, successful DCCV to SR    Procedure: AUBREY/DCCV    Performed By: Wendy Tillman III, DO     Anesthesia: Moderate Sedation    Estimated Blood Loss: Less than 10 mL      Specimens:  None    Findings: as above    Complications: None    Implants: None    Recommendations: Continue medical therapy. Im ok w/ discharge today. Will need to stay on VKA, metoprolol, and the outlined amio dosing. See me in 2-3 wks.     Signed By: Amparo Cruz DO     February 17, 2020

## 2020-02-17 NOTE — PROGRESS NOTES
CM scheduled PCP follow up appt. Office of Dr. Mar Heart prefers to schedule pt appt. Pt has no further needs or concerns at this time. CM will continue to follow patient for discharge planning needs and arrange for services as deemed necessary.     Jossie Wilson, Care Manager  [phone]

## 2020-02-17 NOTE — PROGRESS NOTES
Progress Note      2/17/2020 11:07 AM  NAME: Domingo Torres   MRN:  [id number]   Admit Diagnosis: Atrial fibrillation with rapid ventricular response ([facility] [address] [I48.91]      Problem List:     1. Atrial fibrillation w/ RVR  2. Bilateral pleural effusions  3. Hypertension  4. Hyperlipidemia  5. Glaucoma  6. Prostate CA  7. 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 Continue metoprolol tartrate 100mg q12h  2. Continue statin  3. Continue anticoagulation; consider change to NOAC  4. Will need a repeat echo as an outpt in SR to see if EF improves out of AF  5. Continue amio 400mg q8h x 5d, then q12h x 5d, then QDay x 5d, then to 200mg daily. 6. Continue amio gtt  7. NPO for AUBREY/DCCV this AM         [x]       High complexity decision making was performed in this patient at high risk for decompensation with multiple organ involvement. Subjective:     Domingo Torres denies chest pain, dyspnea. Discussed with RN events overnight. Review of Systems:    Symptom Y/N Comments  Symptom Y/N Comments   Fever/Chills N   Chest Pain N    Poor Appetite N   Edema N    Cough N   Abdominal Pain N    Sputum N   Joint Pain N    SOB/MOMIN N   Pruritis/Rash N    Nausea/vomit N   Tolerating PT/OT Y    Diarrhea N   Tolerating Diet Y    Constipation N   Other       Could NOT obtain due to:      Objective:      Physical Exam:    Last 24hrs VS reviewed since prior progress note.  Most recent are:    Visit Vitals  /70   Pulse 84   Temp 98.6 °F (37 °C)   Resp 18   Ht 5' 8\" (1.727 m)   Wt 88.7 kg (195 lb 8.8 oz)   SpO2 94%   BMI 29.73 kg/m²       Intake/Output Summary (Last 24 hours) at 2/17/2020 0738  Last data filed at 2/17/2020 0000  Gross per 24 hour   Intake --   Output 400 ml   Net -400 ml        General Appearance: Well developed, well nourished, alert & oriented x 3,    no acute distress. Ears/Nose/Mouth/Throat: Hearing grossly normal.  Neck: Supple. Chest: Lungs clear to auscultation bilaterally. Cardiovascular: Irregular rate and rhythm, S1S2 normal, no murmur. Abdomen: Soft, non-tender, bowel sounds are active. Extremities: No edema bilaterally. Skin: Warm and dry. []         Post-cath site without hematoma, bruit, tenderness, or thrill. Distal pulses intact. PMH/SH reviewed - no change compared to H&P    Data Review    Telemetry: AFib/SVT    EKG:   [x]  No new EKG for review    Lab Data Personally Reviewed:    Recent Labs     02/17/20 0538 02/16/20 0451   WBC 8.4 5.8   HGB 12.7 12.1   HCT 40.3 37.9    181     Recent Labs     02/17/20 0538 02/16/20  0451 02/15/20  0737   INR 2.2* 2.3* 3.1*   PTP 21.3* 22.6* 30.3*      Recent Labs     02/17/20  0538 02/16/20  0451 02/15/20  0125 02/14/20 2012    141 144 144   K 3.8 3.4* 3.7 4.0    110* 111* 110*   CO2 23 25 26 26   BUN 22* 18 23* 24*   CREA 1.15 0.94 1.11 1.29   * 103* 108* 122*   CA 8.9 8.7 9.0 9.1   MG  --   --   --  2.3     Recent Labs     02/15/20  0737 02/15/20  0125 02/14/20  2012   TROIQ <0.05 <0.05 <0.05     No results found for: CHOL, CHOLX, CHLST, CHOLV, HDL, HDLP, LDL, LDLC, DLDLP, Zack Spice, CHHD, CHHDX    Recent Labs     02/17/20  0538 02/16/20  0451 02/15/20  0125   SGOT 33 31 38*   AP 84 73 69   TP 6.0* 5.8* 6.1*   ALB 2.7* 2.6* 2.8*   GLOB 3.3 3.2 3.3     No results for input(s): PH, PCO2, PO2 in the last 72 hours.     Medications Personally Reviewed:    Current Facility-Administered Medications   Medication Dose Route Frequency    metoprolol tartrate (LOPRESSOR) tablet 100 mg  100 mg Oral Q12H    amiodarone (CORDARONE) tablet 400 mg  400 mg Oral Q8H    WARFARIN INFORMATION NOTE (COUMADIN)   Other QPM    amiodarone (CORDARONE) 375 mg/250 mL D5W infusion  0.5 mg/min IntraVENous CONTINUOUS    acetaminophen (TYLENOL) tablet 650 mg  650 mg Oral Q6H PRN    sodium chloride (NS) flush 5-40 mL  5-40 mL IntraVENous Q8H    sodium chloride (NS) flush 5-40 mL  5-40 mL IntraVENous PRN    ondansetron (ZOFRAN) injection 4 mg  4 mg IntraVENous Q6H PRN    atorvastatin (LIPITOR) tablet 20 mg  20 mg Oral DAILY    benzonatate (TESSALON) capsule 100 mg  100 mg Oral TID PRN    latanoprost (XALATAN) 0.005 % ophthalmic solution 1 Drop  1 Drop Both Eyes QHS         Hope Roger III, DO

## 2020-02-17 NOTE — PROGRESS NOTES
TRANSFER - OUT REPORT:    Verbal report given to Gwendolyn Montesinos (name) on Isabelle Link  being transferred to 2266 (unit) for routine progression of care       Report consisted of patients Situation, Background, Assessment and   Recommendations(SBAR). Information from the following report(s) SBAR, Kardex, Procedure Summary, Intake/Output, MAR, Recent Results and Cardiac Rhythm NSR w/ PAC's was reviewed with the receiving nurse. Lines:   Peripheral IV 02/14/20 Right Antecubital (Active)   Site Assessment Clean, dry, & intact 2/17/2020  3:00 AM   Phlebitis Assessment 0 2/17/2020  3:00 AM   Infiltration Assessment 0 2/17/2020  3:00 AM   Dressing Status Clean, dry, & intact 2/17/2020  3:00 AM   Dressing Type Transparent 2/17/2020  3:00 AM   Hub Color/Line Status Pink 2/17/2020  3:00 AM   Action Taken Blood drawn 2/14/2020  8:12 PM   Alcohol Cap Used No 2/14/2020  8:12 PM       Peripheral IV 02/14/20 Left Antecubital (Active)   Site Assessment Clean, dry, & intact 2/17/2020  3:00 AM   Phlebitis Assessment 0 2/17/2020  3:00 AM   Infiltration Assessment 0 2/17/2020  3:00 AM   Dressing Status Clean, dry, & intact 2/17/2020  3:00 AM   Dressing Type Transparent 2/17/2020  3:00 AM   Hub Color/Line Status Pink 2/17/2020  3:00 AM   Alcohol Cap Used Yes 2/14/2020  8:22 PM        Opportunity for questions and clarification was provided.       Patient transported with:   O2 @ 2 liters, IV pole    If pt to be discharged today, he can not drive per MD, pt can eat and drink again at 10am if okay with MD.

## 2020-02-17 NOTE — PROGRESS NOTES
Patient arrived to Non-Invasive Cardiology Lab for Inpatient AUBREY/Cardioversion Procedure. Staff introduced to patient. Patient identifiers verified with Name and Date of Birth. Procedure verified with patient. Consent forms reviewed and signed by patient or authorized representative and verified. Allergies verified. Patient informed of procedure and plan of care. Questions answered with review. Patient on cardiac monitor, non-invasive blood pressure, SPO2 monitor. On room air. Patient is A&Ox3. Patient reports no complaints. Patient on stretcher, in low position, with side rails up. Patient instructed to call for assistance as needed.

## 2020-02-19 NOTE — DISCHARGE SUMMARY
Hospitalist Discharge Summary     Patient ID:  Gerardo Cooley  [id number]  58 y.o.  4/23/1928 2/14/2020    PCP on record: Nhung Pearson MD    Admit date: 2/14/2020  Discharge date and time: 2/18/2020    DISCHARGE DIAGNOSIS:    Atrial fibrillation with RVR    Acute CHF with HFrEF  Bilateral pleural effusions and interstitial edema    CONSULTATIONS:  IP CONSULT TO CARDIOLOGY    Excerpted HPI from H&P of Bre Muhammad MD:    80years old male with past medical history significant for hypertension, history of DVT, hyperlipidemia was transferred from [facility] for evaluation of atrial fibrillation with RVR associated with shortness of breath worsened with exertion patient denied chest pain denied fever any chills patient also complained from productive cough whitish colored sputum but denies any fever any chills, chest x-ray was done and show bilateral pleural effusion and interstitial edema     ______________________________________________________________________  DISCHARGE SUMMARY/HOSPITAL COURSE:  for full details see H&P, daily progress notes, labs, consult notes. Atrial fibrillation with RVR  -Admited patient to stepdown  - WAS ON  Cardizem drip  -TSH 1.4  -echocardiogram Estimated left ventricular ejection fraction is 40 - 45%  -Cardiology following \"to start BB, amiodarone\"  -s/p AUBREY/DCCV      Acute CHF with HFrEF  Bilateral pleural effusions and interstitial edema  -echocardiogram Estimated left ventricular ejection fraction is 40 - 45%  -cont BB and repeat ECHO as out-patient  as per cardiology recommendations      _______________________________________________________________________  Patient seen and examined by me on discharge day. Pertinent Findings:  Gen:    Not in distress  Chest: Clear lungs  CVS:   Regular rhythm.   No edema  Abd:  Soft, not distended, not tender  Neuro: Alert  _______________________________________________________________________  DISCHARGE MEDICATIONS:   Discharge Medication List as of 2/17/2020 11:39 AM      START taking these medications    Details   atorvastatin (LIPITOR) 20 mg tablet Take 1 Tab by mouth daily. Indications: high cholesterol, Print, Disp-90 Tab, R-0      metoprolol tartrate (LOPRESSOR) 100 mg IR tablet Take 1 Tab by mouth every twelve (12) hours. , Print, Disp-90 Tab, R-1      amiodarone (CORDARONE) 200 mg tablet amio 400mg (2 tablets) every 8 h x 5 days, then every 12h x 5 days, then daily x 5d, then to 200mg (1 tablet) daily. , Print, Disp-90 Tab, R-1         CONTINUE these medications which have NOT CHANGED    Details   !! warfarin sodium (COUMADIN PO) Take 7.5 mg by mouth five (5) days a week. Tuesday, Wednesday, Thursday, Saturday & Sunday, Historical Med      !! warfarin (COUMADIN) 5 mg tablet Take 5 mg by mouth every Monday., Historical Med      !! warfarin (COUMADIN) 5 mg tablet Take 5 mg by mouth Every Friday., Historical Med      vit C/E/Zn/coppr/lutein/zeaxan (PRESERVISION AREDS-2 PO) Take  by mouth daily. , Historical Med      latanoprost (XALATAN) 0.005 % ophthalmic solution Administer 1 Drop to both eyes nightly., Historical Med      guaiFENesin-codeine (ROBITUSSIN AC) 100-10 mg/5 mL solution Take 5 mL by mouth three (3) times daily as needed for Cough., Historical Med      cholecalciferol (VITAMIN D3) (1000 Units /25 mcg) tablet Take 1,000 Units by mouth daily. , Historical Med      benzonatate (TESSALON PERLES) 100 mg capsule Take 100 mg by mouth three (3) times daily as needed for Cough., Historical Med       !! - Potential duplicate medications found. Please discuss with provider.       STOP taking these medications       metoprolol succinate (TOPROL XL) 50 mg XL tablet Comments:   Reason for Stopping:         simvastatin (ZOCOR) 20 mg tablet Comments:   Reason for Stopping:                 Patient Follow Up Instructions: Activity: Activity as tolerated  Diet: Cardiac Diet  Wound Care: None needed    Follow-up Information     Follow up With Specialties Details Why Contact Info    Ofapryl Servin,  Cardiology Schedule an appointment as soon as possible for a visit in 3 weeks  [address]   [address]  [address] [facility]      Odalys Siegel MD [address] [address]  [facility] , DO Cardiology Call in 2 days For hospital follow up [address] [facility]  [address]  [address] [facility]      Odalys Siegel MD Family Practice On 2/21/2020 This is your hospital follow up appt scheduled for 11:20AM with Ivett Carter NP  [address] [address]  [phone]          ________________________________________________________________    Risk of deterioration: Moderate    Condition at Discharge:  Stable  __________________________________________________________________    Disposition  Home with family, no needs    ____________________________________________________________________    Code Status: Full Code  ___________________________________________________________________      Total time in minutes spent coordinating this discharge (includes going over instructions, follow-up, prescriptions, and preparing report for sign off to her PCP) :  35 minutes    Signed:  León Gentile MD

## 2021-02-17 ENCOUNTER — TRANSCRIBE ORDER (OUTPATIENT)
Dept: SCHEDULING | Age: 86
End: 2021-02-17

## 2021-02-17 DIAGNOSIS — M51.36 DDD (DEGENERATIVE DISC DISEASE), LUMBAR: Primary | ICD-10-CM

## 2021-02-17 DIAGNOSIS — M48.061 LUMBAR STENOSIS: ICD-10-CM

## 2021-03-09 ENCOUNTER — HOSPITAL ENCOUNTER (OUTPATIENT)
Dept: MRI IMAGING | Age: 86
Discharge: HOME | End: 2021-03-09
Attending: ORTHOPAEDIC SURGERY
Payer: MEDICARE

## 2021-03-09 DIAGNOSIS — M48.061 LUMBAR STENOSIS: ICD-10-CM

## 2021-03-09 DIAGNOSIS — M51.36 DDD (DEGENERATIVE DISC DISEASE), LUMBAR: ICD-10-CM

## 2021-03-09 PROCEDURE — 72148 MRI LUMBAR SPINE W/O DYE: CPT

## 2021-04-06 ENCOUNTER — TRANSCRIBE ORDER (OUTPATIENT)
Dept: SCHEDULING | Age: 86
End: 2021-04-06

## 2021-04-06 DIAGNOSIS — T84.022D INSTABILITY OF INTERNAL RIGHT KNEE PROSTHESIS, SUBSEQUENT ENCOUNTER: ICD-10-CM

## 2021-04-06 DIAGNOSIS — T84.062D: Primary | ICD-10-CM

## 2021-04-12 ENCOUNTER — HOSPITAL ENCOUNTER (OUTPATIENT)
Dept: PREADMISSION TESTING | Age: 86
Discharge: HOME | End: 2021-04-12
Payer: MEDICARE

## 2021-04-12 ENCOUNTER — HOSPITAL ENCOUNTER (OUTPATIENT)
Dept: VASCULAR SURGERY | Age: 86
Discharge: HOME | End: 2021-04-12
Attending: ORTHOPAEDIC SURGERY
Payer: MEDICARE

## 2021-04-12 VITALS
BODY MASS INDEX: 29.6 KG/M2 | WEIGHT: 195.33 LBS | HEIGHT: 68 IN | SYSTOLIC BLOOD PRESSURE: 158 MMHG | DIASTOLIC BLOOD PRESSURE: 81 MMHG | TEMPERATURE: 97.7 F | HEART RATE: 55 BPM

## 2021-04-12 DIAGNOSIS — T84.022D INSTABILITY OF INTERNAL RIGHT KNEE PROSTHESIS, SUBSEQUENT ENCOUNTER: ICD-10-CM

## 2021-04-12 DIAGNOSIS — T84.062D: ICD-10-CM

## 2021-04-12 LAB
ABO + RH BLD: NORMAL
ANION GAP SERPL CALC-SCNC: 4 MMOL/L (ref 5–15)
APPEARANCE UR: CLEAR
ATRIAL RATE: 51 BPM
BACTERIA URNS QL MICRO: NEGATIVE /HPF
BILIRUB UR QL: NEGATIVE
BLOOD GROUP ANTIBODIES SERPL: NORMAL
BUN SERPL-MCNC: 28 MG/DL (ref 6–20)
BUN/CREAT SERPL: 21 (ref 12–20)
CALCIUM SERPL-MCNC: 8.4 MG/DL (ref 8.5–10.1)
CALCULATED P AXIS, ECG09: 112 DEGREES
CALCULATED R AXIS, ECG10: -9 DEGREES
CALCULATED T AXIS, ECG11: 19 DEGREES
CHLORIDE SERPL-SCNC: 111 MMOL/L (ref 97–108)
CO2 SERPL-SCNC: 31 MMOL/L (ref 21–32)
COLOR UR: NORMAL
CREAT SERPL-MCNC: 1.34 MG/DL (ref 0.7–1.3)
DIAGNOSIS, 93000: NORMAL
EPITH CASTS URNS QL MICRO: NORMAL /LPF
ERYTHROCYTE [DISTWIDTH] IN BLOOD BY AUTOMATED COUNT: 13.6 % (ref 11.5–14.5)
EST. AVERAGE GLUCOSE BLD GHB EST-MCNC: 108 MG/DL
GLUCOSE SERPL-MCNC: 95 MG/DL (ref 65–100)
GLUCOSE UR STRIP.AUTO-MCNC: NEGATIVE MG/DL
HBA1C MFR BLD: 5.4 % (ref 4–5.6)
HCT VFR BLD AUTO: 36.9 % (ref 36.6–50.3)
HGB BLD-MCNC: 11.7 G/DL (ref 12.1–17)
HGB UR QL STRIP: NEGATIVE
INR PPP: 1.9 (ref 0.9–1.1)
KETONES UR QL STRIP.AUTO: NEGATIVE MG/DL
LEUKOCYTE ESTERASE UR QL STRIP.AUTO: NEGATIVE
MCH RBC QN AUTO: 29.8 PG (ref 26–34)
MCHC RBC AUTO-ENTMCNC: 31.7 G/DL (ref 30–36.5)
MCV RBC AUTO: 93.9 FL (ref 80–99)
NITRITE UR QL STRIP.AUTO: NEGATIVE
NRBC # BLD: 0 K/UL (ref 0–0.01)
NRBC BLD-RTO: 0 PER 100 WBC
P-R INTERVAL, ECG05: 182 MS
PH UR STRIP: 5 [PH] (ref 5–8)
PLATELET # BLD AUTO: 158 K/UL (ref 150–400)
PMV BLD AUTO: 10.2 FL (ref 8.9–12.9)
POTASSIUM SERPL-SCNC: 4.2 MMOL/L (ref 3.5–5.1)
PROT UR STRIP-MCNC: NEGATIVE MG/DL
PROTHROMBIN TIME: 19.7 SEC (ref 9–11.1)
Q-T INTERVAL, ECG07: 520 MS
QRS DURATION, ECG06: 146 MS
QTC CALCULATION (BEZET), ECG08: 479 MS
RBC # BLD AUTO: 3.93 M/UL (ref 4.1–5.7)
RBC #/AREA URNS HPF: NORMAL /HPF (ref 0–5)
SODIUM SERPL-SCNC: 146 MMOL/L (ref 136–145)
SP GR UR REFRACTOMETRY: 1.01 (ref 1–1.03)
SPECIMEN EXP DATE BLD: NORMAL
UA: UC IF INDICATED,UAUC: NORMAL
UROBILINOGEN UR QL STRIP.AUTO: 1 EU/DL (ref 0.2–1)
VENTRICULAR RATE, ECG03: 51 BPM
WBC # BLD AUTO: 6.2 K/UL (ref 4.1–11.1)
WBC URNS QL MICRO: NORMAL /HPF (ref 0–4)

## 2021-04-12 PROCEDURE — 80048 BASIC METABOLIC PNL TOTAL CA: CPT

## 2021-04-12 PROCEDURE — 93925 LOWER EXTREMITY STUDY: CPT

## 2021-04-12 PROCEDURE — 81001 URINALYSIS AUTO W/SCOPE: CPT

## 2021-04-12 PROCEDURE — 36415 COLL VENOUS BLD VENIPUNCTURE: CPT

## 2021-04-12 PROCEDURE — 85610 PROTHROMBIN TIME: CPT

## 2021-04-12 PROCEDURE — 83036 HEMOGLOBIN GLYCOSYLATED A1C: CPT

## 2021-04-12 PROCEDURE — 86901 BLOOD TYPING SEROLOGIC RH(D): CPT

## 2021-04-12 PROCEDURE — 93005 ELECTROCARDIOGRAM TRACING: CPT

## 2021-04-12 PROCEDURE — 85027 COMPLETE CBC AUTOMATED: CPT

## 2021-04-12 RX ORDER — WARFARIN SODIUM 5 MG/1
5 TABLET ORAL DAILY
COMMUNITY
End: 2022-09-16

## 2021-04-12 RX ORDER — CELECOXIB 200 MG/1
200 CAPSULE ORAL DAILY
COMMUNITY
End: 2022-09-30

## 2021-04-12 RX ORDER — FUROSEMIDE 40 MG/1
40 TABLET ORAL DAILY
COMMUNITY

## 2021-04-12 RX ORDER — WARFARIN 2.5 MG/1
5 TABLET ORAL DAILY
Status: ON HOLD | COMMUNITY
End: 2022-09-22 | Stop reason: DRUGHIGH

## 2021-04-12 RX ORDER — AMIODARONE HYDROCHLORIDE 100 MG/1
100 TABLET ORAL DAILY
COMMUNITY
End: 2022-09-16

## 2021-04-12 RX ORDER — FUROSEMIDE 20 MG/1
TABLET ORAL
COMMUNITY

## 2021-04-12 NOTE — PERIOP NOTES
Preoperative instructions reviewed with patient. Patient given two bottles of CHG soap. Instructions(reviewed/to be reviewed in class) on use of CHG soap. Patient given SSI infection FAQS sheet, as well as a  MRSA/MSSA treatment instruction sheet  With an explanation to patient that they will be notified if treatment instructions need to be initiated. Patient was given the opportunity to ask questions on the information provided. PT IS SCHEDULED FOR COVID TESTING.

## 2021-04-13 LAB
BACTERIA SPEC CULT: NORMAL
BACTERIA SPEC CULT: NORMAL
LEFT ARM BP: 141 MMHG
LEFT LOW THIGH PRESSURE: 155 MMHG
RIGHT ARM BP: 135 MMHG
RIGHT LOW THIGH PRESSURE: 194 MMHG
SERVICE CMNT-IMP: NORMAL

## 2021-04-13 NOTE — PERIOP NOTES
Faxed PAT testing reports (and fax confirmation received) to Dr. Marcia Mendez office. Called at 1110 on 04/13/21 (left message on Gigi BARILLAS's voice mail) RE: abnormal PAT labs.     PT: 19.7  INR: 1.9

## 2021-04-16 ENCOUNTER — HOSPITAL ENCOUNTER (OUTPATIENT)
Dept: PREADMISSION TESTING | Age: 86
Discharge: HOME | End: 2021-04-16
Payer: MEDICARE

## 2021-04-16 ENCOUNTER — TRANSCRIBE ORDER (OUTPATIENT)
Dept: REGISTRATION | Age: 86
End: 2021-04-16

## 2021-04-16 DIAGNOSIS — Z01.812 PRE-PROCEDURE LAB EXAM: ICD-10-CM

## 2021-04-16 DIAGNOSIS — Z01.812 PRE-PROCEDURE LAB EXAM: Primary | ICD-10-CM

## 2021-04-16 PROCEDURE — U0003 INFECTIOUS AGENT DETECTION BY NUCLEIC ACID (DNA OR RNA); SEVERE ACUTE RESPIRATORY SYNDROME CORONAVIRUS 2 (SARS-COV-2) (CORONAVIRUS DISEASE [COVID-19]), AMPLIFIED PROBE TECHNIQUE, MAKING USE OF HIGH THROUGHPUT TECHNOLOGIES AS DESCRIBED BY CMS-2020-01-R: HCPCS

## 2021-04-16 RX ORDER — ACETAMINOPHEN 500 MG
1000 TABLET ORAL ONCE
Status: CANCELLED | OUTPATIENT
Start: 2021-04-16 | End: 2021-04-16

## 2021-04-16 RX ORDER — CELECOXIB 200 MG/1
200 CAPSULE ORAL ONCE
Status: CANCELLED | OUTPATIENT
Start: 2021-04-16 | End: 2021-04-16

## 2021-04-16 RX ORDER — PREGABALIN 75 MG/1
75 CAPSULE ORAL ONCE
Status: CANCELLED | OUTPATIENT
Start: 2021-04-16 | End: 2021-04-16

## 2021-04-17 LAB — SARS-COV-2, COV2NT: NOT DETECTED

## 2021-04-19 RX ORDER — MORPHINE SULFATE 2 MG/ML
2 INJECTION, SOLUTION INTRAMUSCULAR; INTRAVENOUS
Status: CANCELLED | OUTPATIENT
Start: 2021-04-19

## 2021-04-19 RX ORDER — SODIUM CHLORIDE 0.9 % (FLUSH) 0.9 %
5-40 SYRINGE (ML) INJECTION AS NEEDED
Status: CANCELLED | OUTPATIENT
Start: 2021-04-19

## 2021-04-19 RX ORDER — SODIUM CHLORIDE, SODIUM LACTATE, POTASSIUM CHLORIDE, CALCIUM CHLORIDE 600; 310; 30; 20 MG/100ML; MG/100ML; MG/100ML; MG/100ML
100 INJECTION, SOLUTION INTRAVENOUS CONTINUOUS
Status: CANCELLED | OUTPATIENT
Start: 2021-04-19

## 2021-04-19 RX ORDER — ONDANSETRON 2 MG/ML
4 INJECTION INTRAMUSCULAR; INTRAVENOUS AS NEEDED
Status: CANCELLED | OUTPATIENT
Start: 2021-04-19

## 2021-04-19 RX ORDER — HYDROMORPHONE HYDROCHLORIDE 1 MG/ML
0.2 INJECTION, SOLUTION INTRAMUSCULAR; INTRAVENOUS; SUBCUTANEOUS
Status: CANCELLED | OUTPATIENT
Start: 2021-04-19

## 2021-04-19 RX ORDER — FENTANYL CITRATE 50 UG/ML
25 INJECTION, SOLUTION INTRAMUSCULAR; INTRAVENOUS
Status: CANCELLED | OUTPATIENT
Start: 2021-04-19

## 2021-04-19 RX ORDER — DIPHENHYDRAMINE HYDROCHLORIDE 50 MG/ML
12.5 INJECTION, SOLUTION INTRAMUSCULAR; INTRAVENOUS AS NEEDED
Status: CANCELLED | OUTPATIENT
Start: 2021-04-19 | End: 2021-04-19

## 2021-04-19 RX ORDER — OXYCODONE HYDROCHLORIDE 5 MG/1
5 TABLET ORAL AS NEEDED
Status: CANCELLED | OUTPATIENT
Start: 2021-04-19

## 2021-04-19 RX ORDER — MIDAZOLAM HYDROCHLORIDE 1 MG/ML
0.5 INJECTION, SOLUTION INTRAMUSCULAR; INTRAVENOUS
Status: CANCELLED | OUTPATIENT
Start: 2021-04-19

## 2021-04-19 RX ORDER — SODIUM CHLORIDE 0.9 % (FLUSH) 0.9 %
5-40 SYRINGE (ML) INJECTION EVERY 8 HOURS
Status: CANCELLED | OUTPATIENT
Start: 2021-04-19

## 2021-04-20 ENCOUNTER — ANESTHESIA (OUTPATIENT)
Dept: SURGERY | Age: 86
DRG: 468 | End: 2021-04-20
Payer: MEDICARE

## 2021-04-20 ENCOUNTER — HOSPITAL ENCOUNTER (INPATIENT)
Age: 86
LOS: 1 days | Discharge: NURSING, REHAB OR LONG-TERM CARE | DRG: 468 | End: 2021-04-23
Attending: ORTHOPAEDIC SURGERY | Admitting: ORTHOPAEDIC SURGERY
Payer: MEDICARE

## 2021-04-20 ENCOUNTER — ANESTHESIA EVENT (OUTPATIENT)
Dept: SURGERY | Age: 86
DRG: 468 | End: 2021-04-20
Payer: MEDICARE

## 2021-04-20 VITALS
DIASTOLIC BLOOD PRESSURE: 54 MMHG | HEART RATE: 54 BPM | OXYGEN SATURATION: 96 % | SYSTOLIC BLOOD PRESSURE: 104 MMHG | RESPIRATION RATE: 15 BRPM

## 2021-04-20 DIAGNOSIS — Z96.651 STATUS POST REVISION OF TOTAL REPLACEMENT OF RIGHT KNEE: ICD-10-CM

## 2021-04-20 DIAGNOSIS — T84.062A: Primary | ICD-10-CM

## 2021-04-20 PROBLEM — T84.022A INSTABILITY OF INTERNAL RIGHT KNEE PROSTHESIS (HCC): Status: ACTIVE | Noted: 2021-04-20

## 2021-04-20 LAB
GLUCOSE BLD STRIP.AUTO-MCNC: 80 MG/DL (ref 65–100)
INR BLD: 1.3 (ref 0.9–1.2)
SERVICE CMNT-IMP: NORMAL

## 2021-04-20 PROCEDURE — 77030014077 HC TOWER MX CEM J&J -C: Performed by: ORTHOPAEDIC SURGERY

## 2021-04-20 PROCEDURE — 74011000250 HC RX REV CODE- 250

## 2021-04-20 PROCEDURE — 97161 PT EVAL LOW COMPLEX 20 MIN: CPT

## 2021-04-20 PROCEDURE — C1713 ANCHOR/SCREW BN/BN,TIS/BN: HCPCS | Performed by: ORTHOPAEDIC SURGERY

## 2021-04-20 PROCEDURE — 85610 PROTHROMBIN TIME: CPT

## 2021-04-20 PROCEDURE — 77030018547 HC SUT ETHBND1 J&J -B: Performed by: ORTHOPAEDIC SURGERY

## 2021-04-20 PROCEDURE — 77030012935 HC DRSG AQUACEL BMS -B: Performed by: ORTHOPAEDIC SURGERY

## 2021-04-20 PROCEDURE — C1776 JOINT DEVICE (IMPLANTABLE): HCPCS | Performed by: ORTHOPAEDIC SURGERY

## 2021-04-20 PROCEDURE — 77030002933 HC SUT MCRYL J&J -A: Performed by: ORTHOPAEDIC SURGERY

## 2021-04-20 PROCEDURE — 74011250637 HC RX REV CODE- 250/637: Performed by: PHYSICIAN ASSISTANT

## 2021-04-20 PROCEDURE — 77030005513 HC CATH URETH FOL11 MDII -B: Performed by: ORTHOPAEDIC SURGERY

## 2021-04-20 PROCEDURE — 74011250636 HC RX REV CODE- 250/636: Performed by: ORTHOPAEDIC SURGERY

## 2021-04-20 PROCEDURE — 74011000250 HC RX REV CODE- 250: Performed by: PHYSICIAN ASSISTANT

## 2021-04-20 PROCEDURE — 77030033067 HC SUT PDO STRATFX SPIR J&J -B: Performed by: ORTHOPAEDIC SURGERY

## 2021-04-20 PROCEDURE — 77030020274 HC MISC IMPL ORTHOPEDIC: Performed by: ORTHOPAEDIC SURGERY

## 2021-04-20 PROCEDURE — 77030010507 HC ADH SKN DERMBND J&J -B: Performed by: ORTHOPAEDIC SURGERY

## 2021-04-20 PROCEDURE — 0SPC0JZ REMOVAL OF SYNTHETIC SUBSTITUTE FROM RIGHT KNEE JOINT, OPEN APPROACH: ICD-10-PCS | Performed by: ORTHOPAEDIC SURGERY

## 2021-04-20 PROCEDURE — 87070 CULTURE OTHR SPECIMN AEROBIC: CPT

## 2021-04-20 PROCEDURE — 77030028907 HC WRP KNEE WO BGS SOLM -B

## 2021-04-20 PROCEDURE — 77030003601 HC NDL NRV BLK BBMI -A

## 2021-04-20 PROCEDURE — 77030040934 HC CATH DIAG DXTERITY MEDT -A: Performed by: ORTHOPAEDIC SURGERY

## 2021-04-20 PROCEDURE — 74011250636 HC RX REV CODE- 250/636: Performed by: ANESTHESIOLOGY

## 2021-04-20 PROCEDURE — 76060000039 HC ANESTHESIA 4 TO 4.5 HR: Performed by: ORTHOPAEDIC SURGERY

## 2021-04-20 PROCEDURE — 0SRC0J9 REPLACEMENT OF RIGHT KNEE JOINT WITH SYNTHETIC SUBSTITUTE, CEMENTED, OPEN APPROACH: ICD-10-PCS | Performed by: ORTHOPAEDIC SURGERY

## 2021-04-20 PROCEDURE — 77030002991 HC SUT QUILL SSPC -B: Performed by: ORTHOPAEDIC SURGERY

## 2021-04-20 PROCEDURE — 77030008462 HC STPLR SKN PROX J&J -A: Performed by: ORTHOPAEDIC SURGERY

## 2021-04-20 PROCEDURE — 74011250636 HC RX REV CODE- 250/636: Performed by: PHYSICIAN ASSISTANT

## 2021-04-20 PROCEDURE — 77030031139 HC SUT VCRL2 J&J -A: Performed by: ORTHOPAEDIC SURGERY

## 2021-04-20 PROCEDURE — 77030007866 HC KT SPN ANES BBMI -B: Performed by: ANESTHESIOLOGY

## 2021-04-20 PROCEDURE — 74011250636 HC RX REV CODE- 250/636: Performed by: NURSE ANESTHETIST, CERTIFIED REGISTERED

## 2021-04-20 PROCEDURE — 2709999900 HC NON-CHARGEABLE SUPPLY

## 2021-04-20 PROCEDURE — 74011250637 HC RX REV CODE- 250/637: Performed by: ORTHOPAEDIC SURGERY

## 2021-04-20 PROCEDURE — 87176 TISSUE HOMOGENIZATION CULTR: CPT

## 2021-04-20 PROCEDURE — 77030040922 HC BLNKT HYPOTHRM STRY -A

## 2021-04-20 PROCEDURE — 2709999900 HC NON-CHARGEABLE SUPPLY: Performed by: ORTHOPAEDIC SURGERY

## 2021-04-20 PROCEDURE — 77030018831 HC SOL IRR H20 BAXT -A: Performed by: ORTHOPAEDIC SURGERY

## 2021-04-20 PROCEDURE — 77030000032 HC CUF TRNQT ZIMM -B: Performed by: ORTHOPAEDIC SURGERY

## 2021-04-20 PROCEDURE — 74011000250 HC RX REV CODE- 250: Performed by: ORTHOPAEDIC SURGERY

## 2021-04-20 PROCEDURE — 82962 GLUCOSE BLOOD TEST: CPT

## 2021-04-20 PROCEDURE — 76210000017 HC OR PH I REC 1.5 TO 2 HR: Performed by: ORTHOPAEDIC SURGERY

## 2021-04-20 PROCEDURE — 74011000250 HC RX REV CODE- 250: Performed by: NURSE ANESTHETIST, CERTIFIED REGISTERED

## 2021-04-20 PROCEDURE — 97116 GAIT TRAINING THERAPY: CPT

## 2021-04-20 PROCEDURE — 87075 CULTR BACTERIA EXCEPT BLOOD: CPT

## 2021-04-20 PROCEDURE — 76010000174 HC OR TIME 3.5 TO 4 HR INTENSV-TIER 1: Performed by: ORTHOPAEDIC SURGERY

## 2021-04-20 PROCEDURE — 77030006835 HC BLD SAW SAG STRY -B: Performed by: ORTHOPAEDIC SURGERY

## 2021-04-20 PROCEDURE — 77030018673: Performed by: ORTHOPAEDIC SURGERY

## 2021-04-20 PROCEDURE — 64447 NJX AA&/STRD FEMORAL NRV IMG: CPT | Performed by: ANESTHESIOLOGY

## 2021-04-20 PROCEDURE — 97530 THERAPEUTIC ACTIVITIES: CPT

## 2021-04-20 DEVICE — IMPLANTABLE DEVICE: Type: IMPLANTABLE DEVICE | Site: KNEE | Status: FUNCTIONAL

## 2021-04-20 DEVICE — IMPLANTABLE DEVICE
Type: IMPLANTABLE DEVICE | Site: KNEE | Status: FUNCTIONAL
Brand: PERSONA®

## 2021-04-20 DEVICE — SMARTSET GMV HIGH PERFORMANCE GENTAMICIN MEDIUM VISCOSITY BONE CEMENT 40G
Type: IMPLANTABLE DEVICE | Site: KNEE | Status: FUNCTIONAL
Brand: SMARTSET

## 2021-04-20 RX ORDER — NALOXONE HYDROCHLORIDE 0.4 MG/ML
0.4 INJECTION, SOLUTION INTRAMUSCULAR; INTRAVENOUS; SUBCUTANEOUS AS NEEDED
Status: DISCONTINUED | OUTPATIENT
Start: 2021-04-20 | End: 2021-04-23 | Stop reason: HOSPADM

## 2021-04-20 RX ORDER — CELECOXIB 200 MG/1
200 CAPSULE ORAL ONCE
Status: COMPLETED | OUTPATIENT
Start: 2021-04-20 | End: 2021-04-20

## 2021-04-20 RX ORDER — AMIODARONE HYDROCHLORIDE 200 MG/1
100 TABLET ORAL DAILY
Status: DISCONTINUED | OUTPATIENT
Start: 2021-04-21 | End: 2021-04-23 | Stop reason: HOSPADM

## 2021-04-20 RX ORDER — PROPOFOL 10 MG/ML
INJECTION, EMULSION INTRAVENOUS
Status: DISCONTINUED | OUTPATIENT
Start: 2021-04-20 | End: 2021-04-20 | Stop reason: HOSPADM

## 2021-04-20 RX ORDER — DEXMEDETOMIDINE HYDROCHLORIDE 100 UG/ML
INJECTION, SOLUTION INTRAVENOUS AS NEEDED
Status: DISCONTINUED | OUTPATIENT
Start: 2021-04-20 | End: 2021-04-20 | Stop reason: HOSPADM

## 2021-04-20 RX ORDER — GLYCOPYRROLATE 0.2 MG/ML
0.2 INJECTION INTRAMUSCULAR; INTRAVENOUS ONCE
Status: DISCONTINUED | OUTPATIENT
Start: 2021-04-20 | End: 2021-04-20 | Stop reason: HOSPADM

## 2021-04-20 RX ORDER — TRANEXAMIC ACID 100 MG/ML
INJECTION, SOLUTION INTRAVENOUS AS NEEDED
Status: DISCONTINUED | OUTPATIENT
Start: 2021-04-20 | End: 2021-04-20 | Stop reason: HOSPADM

## 2021-04-20 RX ORDER — MIDAZOLAM HYDROCHLORIDE 1 MG/ML
1 INJECTION, SOLUTION INTRAMUSCULAR; INTRAVENOUS AS NEEDED
Status: DISCONTINUED | OUTPATIENT
Start: 2021-04-20 | End: 2021-04-20 | Stop reason: HOSPADM

## 2021-04-20 RX ORDER — ATORVASTATIN CALCIUM 20 MG/1
20 TABLET, FILM COATED ORAL DAILY
Status: DISCONTINUED | OUTPATIENT
Start: 2021-04-21 | End: 2021-04-23 | Stop reason: HOSPADM

## 2021-04-20 RX ORDER — EPHEDRINE SULFATE/0.9% NACL/PF 50 MG/5 ML
25 SYRINGE (ML) INTRAVENOUS ONCE
Status: COMPLETED | OUTPATIENT
Start: 2021-04-20 | End: 2021-04-20

## 2021-04-20 RX ORDER — OXYCODONE HYDROCHLORIDE 5 MG/1
5 TABLET ORAL
Status: DISCONTINUED | OUTPATIENT
Start: 2021-04-20 | End: 2021-04-23 | Stop reason: HOSPADM

## 2021-04-20 RX ORDER — SODIUM CHLORIDE, SODIUM LACTATE, POTASSIUM CHLORIDE, CALCIUM CHLORIDE 600; 310; 30; 20 MG/100ML; MG/100ML; MG/100ML; MG/100ML
25 INJECTION, SOLUTION INTRAVENOUS CONTINUOUS
Status: DISCONTINUED | OUTPATIENT
Start: 2021-04-20 | End: 2021-04-20 | Stop reason: HOSPADM

## 2021-04-20 RX ORDER — LIDOCAINE HYDROCHLORIDE 10 MG/ML
0.1 INJECTION, SOLUTION EPIDURAL; INFILTRATION; INTRACAUDAL; PERINEURAL AS NEEDED
Status: DISCONTINUED | OUTPATIENT
Start: 2021-04-20 | End: 2021-04-20 | Stop reason: HOSPADM

## 2021-04-20 RX ORDER — POLYETHYLENE GLYCOL 3350 17 G/17G
17 POWDER, FOR SOLUTION ORAL DAILY
Status: DISCONTINUED | OUTPATIENT
Start: 2021-04-21 | End: 2021-04-23 | Stop reason: HOSPADM

## 2021-04-20 RX ORDER — ONDANSETRON 2 MG/ML
4 INJECTION INTRAMUSCULAR; INTRAVENOUS
Status: ACTIVE | OUTPATIENT
Start: 2021-04-20 | End: 2021-04-21

## 2021-04-20 RX ORDER — EPHEDRINE SULFATE/0.9% NACL/PF 50 MG/5 ML
SYRINGE (ML) INTRAVENOUS
Status: COMPLETED
Start: 2021-04-20 | End: 2021-04-20

## 2021-04-20 RX ORDER — SODIUM CHLORIDE 0.9 % (FLUSH) 0.9 %
5-40 SYRINGE (ML) INJECTION AS NEEDED
Status: DISCONTINUED | OUTPATIENT
Start: 2021-04-20 | End: 2021-04-23 | Stop reason: HOSPADM

## 2021-04-20 RX ORDER — SODIUM CHLORIDE 9 MG/ML
125 INJECTION, SOLUTION INTRAVENOUS CONTINUOUS
Status: DISPENSED | OUTPATIENT
Start: 2021-04-20 | End: 2021-04-22

## 2021-04-20 RX ORDER — GLYCOPYRROLATE 0.2 MG/ML
INJECTION INTRAMUSCULAR; INTRAVENOUS
Status: DISPENSED
Start: 2021-04-20 | End: 2021-04-21

## 2021-04-20 RX ORDER — SODIUM CHLORIDE 0.9 % (FLUSH) 0.9 %
5-40 SYRINGE (ML) INJECTION EVERY 8 HOURS
Status: DISCONTINUED | OUTPATIENT
Start: 2021-04-20 | End: 2021-04-23 | Stop reason: HOSPADM

## 2021-04-20 RX ORDER — FENTANYL CITRATE 50 UG/ML
50 INJECTION, SOLUTION INTRAMUSCULAR; INTRAVENOUS AS NEEDED
Status: DISCONTINUED | OUTPATIENT
Start: 2021-04-20 | End: 2021-04-20 | Stop reason: HOSPADM

## 2021-04-20 RX ORDER — FACIAL-BODY WIPES
10 EACH TOPICAL DAILY PRN
Status: DISCONTINUED | OUTPATIENT
Start: 2021-04-21 | End: 2021-04-23 | Stop reason: HOSPADM

## 2021-04-20 RX ORDER — ROPIVACAINE HYDROCHLORIDE 5 MG/ML
30 INJECTION, SOLUTION EPIDURAL; INFILTRATION; PERINEURAL AS NEEDED
Status: DISCONTINUED | OUTPATIENT
Start: 2021-04-20 | End: 2021-04-20 | Stop reason: HOSPADM

## 2021-04-20 RX ORDER — HYDROMORPHONE HYDROCHLORIDE 1 MG/ML
0.5 INJECTION, SOLUTION INTRAMUSCULAR; INTRAVENOUS; SUBCUTANEOUS
Status: ACTIVE | OUTPATIENT
Start: 2021-04-20 | End: 2021-04-21

## 2021-04-20 RX ORDER — SODIUM CHLORIDE 9 MG/ML
25 INJECTION, SOLUTION INTRAVENOUS CONTINUOUS
Status: DISCONTINUED | OUTPATIENT
Start: 2021-04-20 | End: 2021-04-20 | Stop reason: HOSPADM

## 2021-04-20 RX ORDER — ACETAMINOPHEN 325 MG/1
650 TABLET ORAL ONCE
Status: DISCONTINUED | OUTPATIENT
Start: 2021-04-20 | End: 2021-04-20 | Stop reason: SDUPTHER

## 2021-04-20 RX ORDER — ROPIVACAINE HYDROCHLORIDE 5 MG/ML
INJECTION, SOLUTION EPIDURAL; INFILTRATION; PERINEURAL
Status: COMPLETED | OUTPATIENT
Start: 2021-04-20 | End: 2021-04-20

## 2021-04-20 RX ORDER — SODIUM CHLORIDE 0.9 % (FLUSH) 0.9 %
5-40 SYRINGE (ML) INJECTION AS NEEDED
Status: DISCONTINUED | OUTPATIENT
Start: 2021-04-20 | End: 2021-04-20 | Stop reason: HOSPADM

## 2021-04-20 RX ORDER — ENOXAPARIN SODIUM 100 MG/ML
40 INJECTION SUBCUTANEOUS DAILY
Status: DISCONTINUED | OUTPATIENT
Start: 2021-04-21 | End: 2021-04-23 | Stop reason: HOSPADM

## 2021-04-20 RX ORDER — PREGABALIN 75 MG/1
75 CAPSULE ORAL ONCE
Status: COMPLETED | OUTPATIENT
Start: 2021-04-20 | End: 2021-04-20

## 2021-04-20 RX ORDER — HYDROXYZINE HYDROCHLORIDE 10 MG/1
10 TABLET, FILM COATED ORAL
Status: DISCONTINUED | OUTPATIENT
Start: 2021-04-20 | End: 2021-04-23 | Stop reason: HOSPADM

## 2021-04-20 RX ORDER — PROPOFOL 10 MG/ML
INJECTION, EMULSION INTRAVENOUS AS NEEDED
Status: DISCONTINUED | OUTPATIENT
Start: 2021-04-20 | End: 2021-04-20 | Stop reason: HOSPADM

## 2021-04-20 RX ORDER — BUPIVACAINE HYDROCHLORIDE 5 MG/ML
INJECTION, SOLUTION EPIDURAL; INTRACAUDAL AS NEEDED
Status: DISCONTINUED | OUTPATIENT
Start: 2021-04-20 | End: 2021-04-20 | Stop reason: HOSPADM

## 2021-04-20 RX ORDER — AMOXICILLIN 250 MG
1 CAPSULE ORAL 2 TIMES DAILY
Status: DISCONTINUED | OUTPATIENT
Start: 2021-04-20 | End: 2021-04-23 | Stop reason: HOSPADM

## 2021-04-20 RX ORDER — EPHEDRINE SULFATE/0.9% NACL/PF 50 MG/5 ML
SYRINGE (ML) INTRAVENOUS AS NEEDED
Status: DISCONTINUED | OUTPATIENT
Start: 2021-04-20 | End: 2021-04-20 | Stop reason: HOSPADM

## 2021-04-20 RX ORDER — OXYCODONE HYDROCHLORIDE 5 MG/1
2.5 TABLET ORAL
Status: DISCONTINUED | OUTPATIENT
Start: 2021-04-20 | End: 2021-04-23 | Stop reason: HOSPADM

## 2021-04-20 RX ORDER — WARFARIN 2.5 MG/1
2.5 TABLET ORAL ONCE
Status: COMPLETED | OUTPATIENT
Start: 2021-04-20 | End: 2021-04-20

## 2021-04-20 RX ORDER — SODIUM CHLORIDE 0.9 % (FLUSH) 0.9 %
5-40 SYRINGE (ML) INJECTION EVERY 8 HOURS
Status: DISCONTINUED | OUTPATIENT
Start: 2021-04-20 | End: 2021-04-20 | Stop reason: HOSPADM

## 2021-04-20 RX ORDER — ACETAMINOPHEN 500 MG
1000 TABLET ORAL ONCE
Status: COMPLETED | OUTPATIENT
Start: 2021-04-20 | End: 2021-04-20

## 2021-04-20 RX ORDER — ACETAMINOPHEN 500 MG
500 TABLET ORAL
Status: DISCONTINUED | OUTPATIENT
Start: 2021-04-20 | End: 2021-04-23 | Stop reason: HOSPADM

## 2021-04-20 RX ADMIN — CELECOXIB 200 MG: 200 CAPSULE ORAL at 10:41

## 2021-04-20 RX ADMIN — PROPOFOL 10 MG: 10 INJECTION, EMULSION INTRAVENOUS at 11:22

## 2021-04-20 RX ADMIN — DOCUSATE SODIUM 50 MG AND SENNOSIDES 8.6 MG 1 TABLET: 8.6; 5 TABLET, FILM COATED ORAL at 18:55

## 2021-04-20 RX ADMIN — DEXMEDETOMIDINE HYDROCHLORIDE 4 MCG: 100 INJECTION, SOLUTION, CONCENTRATE INTRAVENOUS at 11:52

## 2021-04-20 RX ADMIN — OXYCODONE HYDROCHLORIDE 5 MG: 5 TABLET ORAL at 22:02

## 2021-04-20 RX ADMIN — ACETAMINOPHEN 500 MG: 500 TABLET ORAL at 21:59

## 2021-04-20 RX ADMIN — ROPIVACAINE HYDROCHLORIDE 20 ML: 5 INJECTION, SOLUTION EPIDURAL; INFILTRATION; PERINEURAL at 11:04

## 2021-04-20 RX ADMIN — Medication 25 MG: at 16:11

## 2021-04-20 RX ADMIN — CEFAZOLIN SODIUM 2 G: 1 INJECTION, POWDER, FOR SOLUTION INTRAMUSCULAR; INTRAVENOUS at 19:37

## 2021-04-20 RX ADMIN — DEXMEDETOMIDINE HYDROCHLORIDE 4 MCG: 100 INJECTION, SOLUTION, CONCENTRATE INTRAVENOUS at 11:33

## 2021-04-20 RX ADMIN — MIDAZOLAM HYDROCHLORIDE 2 MG: 1 INJECTION, SOLUTION INTRAMUSCULAR; INTRAVENOUS at 10:59

## 2021-04-20 RX ADMIN — ACETAMINOPHEN 500 MG: 500 TABLET ORAL at 18:56

## 2021-04-20 RX ADMIN — DEXMEDETOMIDINE HYDROCHLORIDE 4 MCG: 100 INJECTION, SOLUTION, CONCENTRATE INTRAVENOUS at 11:44

## 2021-04-20 RX ADMIN — SODIUM CHLORIDE 125 ML/HR: 9 INJECTION, SOLUTION INTRAVENOUS at 22:04

## 2021-04-20 RX ADMIN — PREGABALIN 75 MG: 75 CAPSULE ORAL at 10:41

## 2021-04-20 RX ADMIN — WATER 2 G: 1 INJECTION INTRAMUSCULAR; INTRAVENOUS; SUBCUTANEOUS at 11:40

## 2021-04-20 RX ADMIN — DEXMEDETOMIDINE HYDROCHLORIDE 4 MCG: 100 INJECTION, SOLUTION, CONCENTRATE INTRAVENOUS at 12:56

## 2021-04-20 RX ADMIN — BUPIVACAINE HYDROCHLORIDE 11 MG: 5 INJECTION, SOLUTION EPIDURAL; INTRACAUDAL; PERINEURAL at 11:24

## 2021-04-20 RX ADMIN — FENTANYL CITRATE 50 MCG: 50 INJECTION, SOLUTION INTRAMUSCULAR; INTRAVENOUS at 10:59

## 2021-04-20 RX ADMIN — SODIUM CHLORIDE: 900 INJECTION, SOLUTION INTRAVENOUS at 14:00

## 2021-04-20 RX ADMIN — SODIUM CHLORIDE, POTASSIUM CHLORIDE, SODIUM LACTATE AND CALCIUM CHLORIDE 25 ML/HR: 600; 310; 30; 20 INJECTION, SOLUTION INTRAVENOUS at 10:57

## 2021-04-20 RX ADMIN — PROPOFOL 20 MCG/KG/MIN: 10 INJECTION, EMULSION INTRAVENOUS at 11:26

## 2021-04-20 RX ADMIN — PROPOFOL 20 MG: 10 INJECTION, EMULSION INTRAVENOUS at 11:38

## 2021-04-20 RX ADMIN — ACETAMINOPHEN 1000 MG: 500 TABLET ORAL at 10:41

## 2021-04-20 RX ADMIN — DEXMEDETOMIDINE HYDROCHLORIDE 4 MCG: 100 INJECTION, SOLUTION, CONCENTRATE INTRAVENOUS at 12:45

## 2021-04-20 RX ADMIN — WARFARIN SODIUM 2.5 MG: 2.5 TABLET ORAL at 18:56

## 2021-04-20 RX ADMIN — PROPOFOL 10 MG: 10 INJECTION, EMULSION INTRAVENOUS at 11:16

## 2021-04-20 RX ADMIN — Medication 5 MG: at 14:44

## 2021-04-20 NOTE — PROGRESS NOTES
Primary Nurse Ruth Arreola RN and Alina Schreiber RN performed a dual skin assessment on this patient Impairment noted- see wound doc flow sheet - scattered scabs/bruising noted. Jose score is 19.

## 2021-04-20 NOTE — PROGRESS NOTES
Pharmacist Note - Warfarin Dosing  Consult provided for this 80 y.o. male to manage warfarin for VTE ppx s/p R TKA. Hx of DVT/afib. INR Goal: 2-3    Therapy Day: 1    Preop Dose: Ramirez- 4mg    Drugs that may increase INR: None  Drugs that may decrease INR: None  Other current anticoagulants/ drugs that may increase bleeding risk: Enoxaparin and NSAID  Risk factors: Age > 65  Daily INR ordered: YES    Recent Labs     04/20/21  1021   INR 1.3*       Date               INR                 Dose  4/20  1.3  2.5 mg    Assessment/ Plan: Will order warfarin 2.5 mg PO x 1 dose. Pharmacy will continue to monitor daily and adjust therapy as indicated.

## 2021-04-20 NOTE — ANESTHESIA POSTPROCEDURE EVALUATION
Post-Anesthesia Evaluation and Assessment    Patient: Karly Crespo MRN: [id number]  SSN: [id number]    YOB: 1928  Age: 80 y.o. Sex: male      I have evaluated the patient and they are stable and ready for discharge from the PACU. Cardiovascular Function/Vital Signs  Visit Vitals  BP (!) 140/65   Pulse (!) 47   Temp 36.2 °C (97.1 °F)   Resp 14   Ht 5' 8\" (1.727 m)   Wt 88.6 kg (195 lb 5.2 oz)   SpO2 100%   BMI 29.70 kg/m²       Patient is status post Spinal anesthesia for Procedure(s):  REVISION RIGHT TOTAL KNEE ARTHROPLASTY (SPINAL W/BLOCK W/IV SEDATION). Nausea/Vomiting: None    Postoperative hydration reviewed and adequate. Pain:  Pain Scale 1: Numeric (0 - 10) (04/20/21 1008)  Pain Intensity 1: 3 (04/20/21 1008)   Managed    Neurological Status:   Neuro (WDL): Within Defined Limits (04/20/21 1036)   At baseline    Mental Status, Level of Consciousness: Alert and  oriented to person, place, and time    Pulmonary Status:   O2 Device: CO2 nasal cannula (04/20/21 1508)   Adequate oxygenation and airway patent    Complications related to anesthesia: None    Post-anesthesia assessment completed. No concerns    Signed By: Raquel Romero MD     April 20, 2021              Procedure(s):  REVISION RIGHT TOTAL KNEE ARTHROPLASTY (SPINAL W/BLOCK W/IV SEDATION). spinal    <BSHSIANPOST>    INITIAL Post-op Vital signs:   Vitals Value Taken Time   /65 04/20/21 1530   Temp 36.2 °C (97.1 °F) 04/20/21 1508   Pulse 43 04/20/21 1541   Resp 15 04/20/21 1541   SpO2 97 % 04/20/21 1541   Vitals shown include unvalidated device data.

## 2021-04-20 NOTE — ANESTHESIA PROCEDURE NOTES
Spinal Block    Start time: 4/20/2021 11:15 AM  End time: 4/20/2021 11:25 AM  Performed by: Suzie Yu MD  Authorized by: Suzie Yu MD     Pre-procedure:   Indications: at surgeon's request and primary anesthetic  Preanesthetic Checklist: patient identified, risks and benefits discussed, anesthesia consent, site marked, patient being monitored and timeout performed    Timeout Time: 11:15          Spinal Block:   Patient Position:  Seated  Prep Region:  Lumbar  Prep: Betadine      Location:  L4-5  Technique:  Single shot        Needle:     Needle Gauge:  24 G        Events: CSF confirmed and no blood with aspiration        Assessment:  Insertion:  Uncomplicated  Patient tolerance:  Patient tolerated the procedure well with no immediate complications

## 2021-04-20 NOTE — BRIEF OP NOTE
Brief Postoperative Note    Patient: Gera Hooks  YOB: 1928  MRN: [id number]    Date of Procedure: 4/20/2021     Pre-Op Diagnosis: Instability of internal right knee prosthesis, subsequent encounter [T84.022D]  Polyethylene wear of right knee prosthesis, subsequent encounter [T84.062D]  S/P TKR (total knee replacement), right [Z96.651]    Post-Op Diagnosis: Same as preoperative diagnosis. Procedure(s):  REVISION RIGHT TOTAL KNEE ARTHROPLASTY (SPINAL W/BLOCK W/IV SEDATION)    Surgeon(s):  Grady Murray MD    Surgical Assistant: Physician Assistant: Griselda Lager, PA-C  Surg Asst-1: Fabiola ARCHULETA    Anesthesia: Spinal     Estimated Blood Loss (mL): 442     Complications: None    Specimens:   ID Type Source Tests Collected by Time Destination   1 : RIGHT KNEE SYNOVIAL FLUID Joint Fluid Joint, Knee ANAEROBIC/AEROBIC/GRAM Georgette Quijano MD 4/20/2021 1154 Microbiology   2 : RIGHT KNEE SYNOVIUM Tissue Knee, right ANAEROBIC/AEROBIC/GRAM Georgette Quijano MD 4/20/2021 1157 Microbiology   3 : FEMORAL INTERFACE MEMBRANE Tissue Knee, right ANAEROBIC/AEROBIC/GRAM Georgette Quijano MD 4/20/2021 1213 Microbiology        Implants:   Implant Name Type Inv.  Item Serial No.  Lot No. LRB No. Used Action   CEMENT BNE GENTAMICIN 40 GM SMARTSET GMV - SN/A  CEMENT BNE GENTAMICIN 40 GM SMARTSET GMV N/A [employer]_ [id number] Right 4 Implanted   TIBIAL CENTRAL CORE SZ MED   N/A [employer] [id number] Right 1 Implanted   STEM EXTENSION 15 MM STRAIGHT   N/A [employer] [id number] Right 1 Implanted   COMP TIBIAL FIXED SZ F - SN/A Joint Component COMP TIBIAL FIXED SZ F N/A [employer] [id number] Right 1 Implanted   COMPONENT FEM MALINDA 11 STD RT KNEE REV COCR PERSONA - SN/A  COMPONENT FEM MALINDA 11 STD RT KNEE REV COCR PERSONA N/A [employer]_ [id number] Right 1 Implanted   COMPONENT FEM AUG 11 5 MM POST PERSONA - SN/A  COMPONENT FEM AUG 11 5 MM POST PERSONA N/A [employer]_WD [id number] Right 1 Implanted   COMPONENT FEM AUG 11 15 MM POST PERSONA - SN/A  COMPONENT FEM AUG 11 15 MM POST PERSONA N/A [employer]WD [id number] Right 1 Implanted   FEMORAL DISTAL AUGMENT SZ 11, 11+ 5 MM THICKNESS   N/A [employer] [id number] Right 1 Implanted   STEM EXTENSION 3 MM OFFSET SPLINED 17 MM DIAMETER   N/A [employer] [id number] Right 1 Implanted   POLY CCK RIGHT 20 MM WTIH SCREW   N/A [employer] [id number] Right 1 Implanted       Drains: * No LDAs found *    Findings: Instability of internal right knee prosthesis,  Polyethylene wear of right knee prosthesis,  S/P TKR  Electronically Signed by Aniket Deleon PA-C on 4/20/2021 at 2:54 PM

## 2021-04-20 NOTE — ANESTHESIA PREPROCEDURE EVALUATION
Relevant Problems   No relevant active problems       Anesthetic History   No history of anesthetic complications            Review of Systems / Medical History  Patient summary reviewed, nursing notes reviewed and pertinent labs reviewed    Pulmonary  Within defined limits                 Neuro/Psych   Within defined limits           Cardiovascular            Dysrhythmias : atrial fibrillation  Hyperlipidemia    Exercise tolerance: >4 METS  Comments: EF 50 on last TTE, no significant valvular abnormalities   GI/Hepatic/Renal  Within defined limits              Endo/Other        Arthritis     Other Findings   Comments: INR 1.3           Physical Exam    Airway  Mallampati: II  TM Distance: 4 - 6 cm  Neck ROM: normal range of motion   Mouth opening: Normal     Cardiovascular    Rhythm: regular  Rate: normal         Dental    Dentition: Full lower dentures and Full upper dentures     Pulmonary  Breath sounds clear to auscultation               Abdominal  GI exam deferred       Other Findings            Anesthetic Plan    ASA: 3  Anesthesia type: spinal      Post-op pain plan if not by surgeon: peripheral nerve block single      Anesthetic plan and risks discussed with: Patient

## 2021-04-20 NOTE — ANESTHESIA PROCEDURE NOTES
Peripheral Block    Start time: 4/20/2021 11:00 AM  End time: 4/20/2021 11:05 AM  Performed by: Amari Reinoso MD  Authorized by: Amari Reinoso MD       Pre-procedure: Indications: at surgeon's request and post-op pain management    Preanesthetic Checklist: patient identified, risks and benefits discussed, site marked, timeout performed, anesthesia consent given and patient being monitored    Timeout Time: 11:00          Block Type:   Block Type:   Adductor canal  Laterality:  Right  Monitoring:  Standard ASA monitoring, continuous pulse ox, frequent vital sign checks, heart rate, responsive to questions and oxygen  Injection Technique:  Single shot  Procedures: ultrasound guided    Patient Position: supine  Prep: DuraPrep    Needle Type:  Stimuplex  Needle Gauge:  22 G  Needle Localization:  Ultrasound guidance  Medication Injected:  Ropivacaine (PF) (NAROPIN)(0.5%) 5 mg/mL injection, 20 mL  Med Admin Time: 4/20/2021 11:04 AM    Assessment:  Number of attempts:  1  Injection Assessment:  Incremental injection every 5 mL, local visualized surrounding nerve on ultrasound, negative aspiration for blood, no paresthesia and no intravascular symptoms  Patient tolerance:  Patient tolerated the procedure well with no immediate complications

## 2021-04-20 NOTE — ROUTINE PROCESS
Patient: Karly Crespo MRN: [id number]  SSN: [id number]   YOB: 1928  Age: 80 y.o. Sex: male     Patient is status post Procedure(s):  REVISION RIGHT TOTAL KNEE ARTHROPLASTY (SPINAL W/BLOCK W/IV SEDATION). Surgeon(s) and Role:     Olga Gonzalez MD - Primary    Local/Dose/Irrigation:  SEE MAR                  Peripheral IV 04/20/21 Left Wrist (Active)   Site Assessment Clean, dry, & intact 04/20/21 1056   Phlebitis Assessment 0 04/20/21 1056   Infiltration Assessment 0 04/20/21 1056   Dressing Status Clean, dry, & intact; New 04/20/21 1056   Dressing Type Transparent;Tape 04/20/21 1056   Hub Color/Line Status Blue 04/20/21 1056                           Dressing/Packing:  Incision 04/20/21 Knee Right-Dressing/Treatment: Alginate with Ag (04/20/21 1417)    Splint/Cast:  ]    Other:  I/O CATH

## 2021-04-20 NOTE — PROGRESS NOTES
TRANSFER - IN REPORT:    Verbal report received from [id number] Hunter Mcneill RN(name) on Yara Parsons  being received from [facility]) for routine post - op      Report consisted of patients Situation, Background, Assessment and   Recommendations(SBAR). Information from the following report(s) SBAR, OR Summary, Procedure Summary, Intake/Output and MAR was reviewed with the receiving nurse. Opportunity for questions and clarification was provided. Assessment completed upon patients arrival to unit and care assumed.

## 2021-04-20 NOTE — DISCHARGE SUMMARY
[facility]     DISCHARGE SUMMARY     Name: Georgette Shah       MR#: [id number]    : 1928  ADMIT DATE: 2021  DISCHARGE DATE: 2021     ADMISSION DIAGNOSIS: Instability of internal right knee prosthesis, subsequent encounter [T84.022D]  Polyethylene wear of right knee prosthesis, subsequent encounter [T84.062D]  S/P TKR (total knee replacement), right [Z96.651]  History of total knee replacement, right [Z96.651]  Polyethylene wear of right knee joint prosthesis, subsequent encounter [T84.062D]  Polyethylene wear of right knee joint prosthesis, initial encounter ([facility] [T84.062A]     DISCHARGE DIAGNOSIS: Instability of internal right knee prosthesis, subsequent encounter [T84.022D]  Polyethylene wear of right knee prosthesis, subsequent encounter [T84.062D]  S/P TKR (total knee replacement), right [Z96.651]     PROCEDURE PERFORMED: Procedure(s):  REVISION RIGHT TOTAL KNEE ARTHROPLASTY (SPINAL W/BLOCK W/IV SEDATION)     CONSULTATIONS:  None.     HISTORY OF PRESENT ILLNESS: The patient is a 72-year-old gentleman who is more than 20 years out from right primary total knee replacement. He has had progressive pain and instability. Radiographs demonstrate asymmetric polyethylene wear, worse in the posteromedial aspect, or possible fracture of the polyethylene with subluxation of the knee. The patellar button is loose and floating in the suprapatellar pouch. Infection workup was negative. He presents for revision of his right total knee, as decreasing mobility is now threatening his independence. Risks, benefits, alternatives of procedure were reviewed with him in detail and he desires to proceed.     HOSPITAL COURSE:  The patient underwent the aforementioned procedure on date of admission under spinal anesthesia with adductor canal block. There were no immediate postoperative complications.  He was started on a multimodal pain regimen and DVT prophylaxis.     DISPOSITION: The patient made slow, steady progress with physical therapy and was appropriate for discharge to Home in stable condition on postoperative day 3. DISCHARGE MEDICATIONS:  Reinitiate preadmission medications. In addition, the patient will be on Lovenox to coumadin bridge for DVT prophylaxis and low dose oxycodone and Tylenol for pain. DISCHARGE INSTRUCTIONS:  Detailed printed instructions were provided to the patient. Follow up with Dr. Hernesto Wright in approximately 3 weeks. The patient will receive home health physical therapy in the meantime.     Signed by: Ronaldo Izaguirre PA-C  4/25/2021

## 2021-04-20 NOTE — PROGRESS NOTES
Problem: Mobility Impaired (Adult and Pediatric)  Goal: *Acute Goals and Plan of Care (Insert Text)  Description: FUNCTIONAL STATUS PRIOR TO ADMISSION: (per patient): Patient was modified independent using a single point cane for functional mobility. HOME SUPPORT PRIOR TO ADMISSION: The patient lived with wife but did not require assist. Daughter available to assist as needed. Physical Therapy Goals  Initiated 4/20/2021    1. Patient will move from supine to sit and sit to supine , scoot up and down, and roll side to side in bed with modified independence within 4 days. 2. Patient will perform sit to stand with modified independence within 4 days. 3. Patient will ambulate with modified independence for 75 feet with the least restrictive device within 4 days. 4. Patient will ascend/descend 4 stairs with both handrail(s) with contact guard assist within 4 days. 5. Patient will perform home exercise program per protocol with independence within 4 days. 6. Patient will demonstrate AROM 0-90 degrees in operative joint within 4 days. Outcome: Progressing Towards Goal   PHYSICAL THERAPY EVALUATION  Patient: Anselmo John (49 y.o. male)  Date: 4/20/2021  Primary Diagnosis: Instability of internal right knee prosthesis, subsequent encounter [T84.022D]  Polyethylene wear of right knee prosthesis, subsequent encounter [T84.062D]  S/P TKR (total knee replacement), right [Z96.651]  History of total knee replacement, right [Z96.651]  Polyethylene wear of right knee joint prosthesis, subsequent encounter [T84.062D]  Procedure(s) (LRB):  REVISION RIGHT TOTAL KNEE ARTHROPLASTY (SPINAL W/BLOCK W/IV SEDATION) (Right) Day of Surgery   Precautions:   Fall, WBAT(Hard of Hearing)    ASSESSMENT  Based on the objective data described below, the patient presents with  impairment in functional mobility, activity tolerance and balance s/p revision of R TKA.  Per patient, he was modified independent using a cane in the home prior to admission. He lives with wife in a one story home, 3 steps with bilateral rails to enter. Wife is currently in the ED at this time with abdominal pain and difficulty breathing. Patient states that his daughter is in ED with wife and will come up to the floor to see him later. Per patient, original plan was for daughter to stay with patient after surgery as needed. Patient was able to get up and ambulate a short distance with RW and gait belt, but right knee was buckling and hyperextending, so patient required minimal assistance for that, 10 ft in room. Patient instructed NOT to get up from bed, chair or commode without calling for assistance. Initiated post TKA exercise protocol and wrote same on [employer]. Anticipate 2-3 more PT sessions prior to discharge. Current Level of Function Impacting Discharge (mobility/balance): Minimal assistance for bed mobility (assist to sit up as well as manage Les)/contact guard for sit-stand-sit, minimal assistance for gait due to right knee instability. Functional Outcome Measure: The patient scored 40/100 on the Barthel outcome measure which is indicative of maximal-moderate impaired ability to care for basic self-needs/dependency on others. .      Other factors to consider for discharge: Motivated/A & O x 4/Wife ill, currently in hospital/Modified Independent PLOF     Patient will benefit from skilled therapy intervention to address the above noted impairments. PLAN :  Recommendations and Planned Interventions: bed mobility training, transfer training, gait training, therapeutic exercises, patient and family training/education, and therapeutic activities      Frequency/Duration: Patient will be followed by physical therapy:  twice daily to address goals.     Recommendation for discharge: (in order for the patient to meet his/her long term goals)  Physical therapy at least 2 days/week in the home AND ensure assist and/or supervision for safety with mobility and ADLs. This discharge recommendation:  A follow-up discussion with the attending provider and/or case management is planned    IF patient discharges home will need the following DME: patient owns DME required for discharge         SUBJECTIVE:   Patient stated I guess I am really nervous. My wife is in the Emergency Room with pain and trouble breathing.     OBJECTIVE DATA SUMMARY:   HISTORY:    Past Medical History:   Diagnosis Date    Arthritis     DJD    Cancer ([facility]     prostate    Chronic pain     History of colonoscopy with polypectomy 2005~    Hypercholesterolemia     Other ill-defined conditions(799.89)     cataracts    Thromboembolus ([facility]     3 x left leg, 1 x right leg     Past Surgical History:   Procedure Laterality Date    HX CATARACT REMOVAL Bilateral     HX KNEE REPLACEMENT Left     HX MOHS PROCEDURES  1995    right    HX OTHER SURGICAL  1996    [place] filter insertion    HX PROSTATECTOMY  age 72    HX ROTATOR CUFF REPAIR Right     HX TONSILLECTOMY      OR TOTAL HIP ARTHROPLASTY      left    OR TOTAL KNEE ARTHROPLASTY      right       Personal factors and/or comorbidities impacting plan of care:  Motivated/A & O x 4/Wife ill, currently in hospital/Modified [address]: Private residence  # Steps to Enter: 3  Rails to Enter: Yes  Hand Rails : Bilateral  Wheelchair Ramp: No  One/Two Story Residence: One story  Living Alone: No  Support Systems: Spouse/Significant Other/Partner, Child(kita)(wife is ill and is in ED right now)  Patient Expects to be Discharged to[de-identified] Private residence  Current DME Used/Available at Home: Cane, straight, Walker, rolling    EXAMINATION/PRESENTATION/DECISION MAKING:   Critical Behavior:   A & O x 4  Appropriate safety awareness           Hearing:  VERY hard of hearing; wears hearing aids     Range Of Motion:  AROM: Generally decreased, functional           PROM: Generally decreased, functional Strength:    Strength: Generally decreased, functional                    Tone & Sensation:   Tone: Normal              Sensation: Intact               Coordination:  Coordination: Within functional limits  Vision:      Functional Mobility:  Bed Mobility:     Supine to Sit: Minimum assistance; Adaptive equipment; Additional time;Bed Modified(HOB elevated/bed rail/assist to sit up & manage RLE)  Sit to Supine: Minimum assistance; Additional time(Assist to manage LEs)     Transfers:  Sit to Stand: Contact guard assistance  Stand to Sit: Contact guard assistance                       Balance:   Sitting: Intact  Standing: Impaired; Without support  Standing - Static: Fair;Constant support  Standing - Dynamic : Fair;Constant support  Ambulation/Gait Training:  Distance (ft): 10 Feet (ft)  Assistive Device: Walker, rolling;Gait belt  Ambulation - Level of Assistance: Minimal assistance(due to right knee instability)        Gait Abnormalities: Antalgic;Decreased step clearance; Step to gait;Trunk sway increased(right knee buckling)  Right Side Weight Bearing: As tolerated     Base of Support: Widened;Shift to left  Stance: Right decreased(right knee buckling)  Speed/Angela: Pace decreased (<100 feet/min); Shuffled  Step Length: Right shortened;Left shortened  Swing Pattern: Right asymmetrical     Interventions: Safety awareness training;Verbal cues                  Therapeutic Exercises: Ankle Pumps  Ham Sets  Quad Sets  Heel Slides  X 10 each every hour     Functional Measure:  Barthel Index:    Bathin  Bladder: 10  Bowels: 10  Groomin  Dressin  Feeding: 10  Mobility: 0  Stairs: 0  Toilet Use: 5  Transfer (Bed to Chair and Back): 5  Total: 40/100       The Barthel ADL Index: Guidelines  1. The index should be used as a record of what a patient does, not as a record of what a patient could do.   2. The main aim is to establish degree of independence from any help, physical or verbal, however minor and for whatever reason. 3. The need for supervision renders the patient not independent. 4. A patient's performance should be established using the best available evidence. Asking the patient, friends/relatives and nurses are the usual sources, but direct observation and common sense are also important. However direct testing is not needed. 5. Usually the patient's performance over the preceding 24-48 hours is important, but occasionally longer periods will be relevant. 6. Middle categories imply that the patient supplies over 50 per cent of the effort. 7. Use of aids to be independent is allowed. Damion Rosen., Barthel, D.W. (9051). Functional evaluation: the Barthel Index. [address] [facility] (14)2. Wes Jnoes apoorva SERGEY Burgos, Priya Kulkarni., Merlene Langston., [place], [address] (). Measuring the change indisability after inpatient rehabilitation; comparison of the responsiveness of the Barthel Index and Functional [place] Measure. Journal of Neurology, Neurosurgery, and Psychiatry, 66(4), 708-683. Vane Woodson, N.[place].A, ANN-MARIE Ramírez, & Chad Jung MFEDERICA. (2004.) Assessment of post-stroke quality of life in cost-effectiveness studies: The usefulness of the Barthel Index and the EuroQoL-5D.  Quality of Life Research, 15, 457-65           Physical Therapy Evaluation Charge Determination   History Examination Presentation Decision-Making   MEDIUM  Complexity : 1-2 comorbidities / personal factors will impact the outcome/ POC  MEDIUM Complexity : 3 Standardized tests and measures addressing body structure, function, activity limitation and / or participation in recreation  LOW Complexity : Stable, uncomplicated  MEDIUM Complexity : FOTO score of 26-74      Based on the above components, the patient evaluation is determined to be of the following complexity level: LOW     Pain Ratin/10    Activity Tolerance:   Fair  Vitals:    21 1610 21 1615 21 1710 21 1811   BP:   (!) 168/79 (!) 150/68 BP 1 Location:   Right upper arm Right upper arm   BP Patient Position:   At rest At rest   Pulse:  (!) 50 68 65   Resp: 14 14 14 16   Temp:   (!) 96.6 °F (35.9 °C) (!) 95.8 °F (35.4 °C)   SpO2: 100% 100% 100% 100%   Weight:       Height:            After treatment patient left in no apparent distress:   Supine in bed, Call bell within reach, Side rails x 3, and nurse notified. COMMUNICATION/EDUCATION:   The patients plan of care was discussed with: Registered nurse. Fall prevention education was provided and the patient/caregiver indicated understanding., Patient/family have participated as able in goal setting and plan of care. , and Patient/family agree to work toward stated goals and plan of care.     Thank you for this referral.  Kathryn Garcia   Time Calculation: 30 mins

## 2021-04-21 LAB
ANION GAP SERPL CALC-SCNC: 6 MMOL/L (ref 5–15)
BUN SERPL-MCNC: 21 MG/DL (ref 6–20)
BUN/CREAT SERPL: 14 (ref 12–20)
CALCIUM SERPL-MCNC: 8.1 MG/DL (ref 8.5–10.1)
CHLORIDE SERPL-SCNC: 107 MMOL/L (ref 97–108)
CO2 SERPL-SCNC: 26 MMOL/L (ref 21–32)
CREAT SERPL-MCNC: 1.45 MG/DL (ref 0.7–1.3)
GLUCOSE SERPL-MCNC: 120 MG/DL (ref 65–100)
HGB BLD-MCNC: 8.5 G/DL (ref 12.1–17)
INR PPP: 1.3 (ref 0.9–1.1)
POTASSIUM SERPL-SCNC: 4.3 MMOL/L (ref 3.5–5.1)
PROTHROMBIN TIME: 13.1 SEC (ref 9–11.1)
SODIUM SERPL-SCNC: 139 MMOL/L (ref 136–145)

## 2021-04-21 PROCEDURE — 85018 HEMOGLOBIN: CPT

## 2021-04-21 PROCEDURE — 80048 BASIC METABOLIC PNL TOTAL CA: CPT

## 2021-04-21 PROCEDURE — 97116 GAIT TRAINING THERAPY: CPT

## 2021-04-21 PROCEDURE — 74011250637 HC RX REV CODE- 250/637: Performed by: ORTHOPAEDIC SURGERY

## 2021-04-21 PROCEDURE — 77030019905 HC CATH URETH INTMIT MDII -A

## 2021-04-21 PROCEDURE — 85610 PROTHROMBIN TIME: CPT

## 2021-04-21 PROCEDURE — 74011250636 HC RX REV CODE- 250/636: Performed by: PHYSICIAN ASSISTANT

## 2021-04-21 PROCEDURE — 74011000250 HC RX REV CODE- 250: Performed by: PHYSICIAN ASSISTANT

## 2021-04-21 PROCEDURE — 74011250637 HC RX REV CODE- 250/637: Performed by: PHYSICIAN ASSISTANT

## 2021-04-21 PROCEDURE — 77030027138 HC INCENT SPIROMETER -A

## 2021-04-21 PROCEDURE — 51798 US URINE CAPACITY MEASURE: CPT

## 2021-04-21 PROCEDURE — 97530 THERAPEUTIC ACTIVITIES: CPT

## 2021-04-21 PROCEDURE — 36415 COLL VENOUS BLD VENIPUNCTURE: CPT

## 2021-04-21 RX ORDER — OXYCODONE HYDROCHLORIDE 5 MG/1
2.5-5 TABLET ORAL
Qty: 42 TAB | Refills: 0 | Status: SHIPPED
Start: 2021-04-21 | End: 2021-04-23

## 2021-04-21 RX ORDER — TAMSULOSIN HYDROCHLORIDE 0.4 MG/1
0.4 CAPSULE ORAL DAILY
Status: DISCONTINUED | OUTPATIENT
Start: 2021-04-21 | End: 2021-04-23 | Stop reason: HOSPADM

## 2021-04-21 RX ORDER — ACETAMINOPHEN 500 MG
500 TABLET ORAL EVERY 4 HOURS
Qty: 100 TAB | Refills: 0 | Status: SHIPPED
Start: 2021-04-21 | End: 2022-09-30

## 2021-04-21 RX ORDER — AMOXICILLIN 250 MG
1 CAPSULE ORAL
Qty: 60 TAB | Refills: 0 | Status: SHIPPED | OUTPATIENT
Start: 2021-04-21 | End: 2022-09-16

## 2021-04-21 RX ORDER — WARFARIN SODIUM 5 MG/1
5 TABLET ORAL ONCE
Status: COMPLETED | OUTPATIENT
Start: 2021-04-21 | End: 2021-04-21

## 2021-04-21 RX ADMIN — ACETAMINOPHEN 500 MG: 500 TABLET ORAL at 10:44

## 2021-04-21 RX ADMIN — ACETAMINOPHEN 500 MG: 500 TABLET ORAL at 06:52

## 2021-04-21 RX ADMIN — DOCUSATE SODIUM 50 MG AND SENNOSIDES 8.6 MG 1 TABLET: 8.6; 5 TABLET, FILM COATED ORAL at 09:11

## 2021-04-21 RX ADMIN — WARFARIN SODIUM 5 MG: 5 TABLET ORAL at 11:49

## 2021-04-21 RX ADMIN — SODIUM CHLORIDE 125 ML/HR: 9 INJECTION, SOLUTION INTRAVENOUS at 18:24

## 2021-04-21 RX ADMIN — TAMSULOSIN HYDROCHLORIDE 0.4 MG: 0.4 CAPSULE ORAL at 13:52

## 2021-04-21 RX ADMIN — ACETAMINOPHEN 500 MG: 500 TABLET ORAL at 13:52

## 2021-04-21 RX ADMIN — Medication 10 ML: at 06:52

## 2021-04-21 RX ADMIN — OXYCODONE HYDROCHLORIDE 5 MG: 5 TABLET ORAL at 21:10

## 2021-04-21 RX ADMIN — DOCUSATE SODIUM 50 MG AND SENNOSIDES 8.6 MG 1 TABLET: 8.6; 5 TABLET, FILM COATED ORAL at 21:10

## 2021-04-21 RX ADMIN — OXYCODONE HYDROCHLORIDE 5 MG: 5 TABLET ORAL at 09:15

## 2021-04-21 RX ADMIN — ENOXAPARIN SODIUM 40 MG: 40 INJECTION SUBCUTANEOUS at 09:00

## 2021-04-21 RX ADMIN — Medication 10 ML: at 22:00

## 2021-04-21 RX ADMIN — ATORVASTATIN CALCIUM 20 MG: 20 TABLET, FILM COATED ORAL at 09:11

## 2021-04-21 RX ADMIN — OXYCODONE HYDROCHLORIDE 5 MG: 5 TABLET ORAL at 15:30

## 2021-04-21 RX ADMIN — AMIODARONE HYDROCHLORIDE 100 MG: 200 TABLET ORAL at 09:11

## 2021-04-21 RX ADMIN — ACETAMINOPHEN 500 MG: 500 TABLET ORAL at 21:10

## 2021-04-21 RX ADMIN — CEFAZOLIN SODIUM 2 G: 1 INJECTION, POWDER, FOR SOLUTION INTRAMUSCULAR; INTRAVENOUS at 03:30

## 2021-04-21 NOTE — OP NOTES
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 PROCEDURE PERFORMED:  Revision right total knee replacement, femoral and tibial components. SURGEON:  Oliver Bianchi MD    ASSISTANT:  Marlene Ritter PA-C    ANESTHESIA:  Spinal with sedation as well as adductor canal block. COMPLICATIONS:  None. SPECIMENS REMOVED:  Culture x3. IMPLANTS:  Components implanted; Leticia Persona size 11 standard revision femur with 17 x 135 mm cementless stem, 5 mm distal lateral, 15 mm posterolateral and 5-mm posteromedial augments; size F tibial tray with 15 x 135 mm cementless stem and medium metaphyseal trabecular metal cone; 20-mm CCK polyethylene insert. ESTIMATED BLOOD LOSS:  250 mL. INDICATIONS:  The patient is a 59-year-old gentleman who is more than 20 years out from right primary total knee replacement. He has had progressive pain and instability. Radiographs demonstrate asymmetric polyethylene wear, worse in the posteromedial aspect, or possible fracture of the polyethylene with subluxation of the knee. The patellar button is loose and floating in the suprapatellar pouch. Infection workup was negative. He presents for revision of his right total knee, as decreasing mobility is now threatening his independence. Risks, benefits, alternatives of procedure were reviewed with him in detail and he desires to proceed. PROCEDURE IN DETAIL:  Anesthesia team placed an adductor canal block in the right thigh before taking the patient to the operating room and they also placed a spinal.  Preoperative IV antibiotics were administered.   Padded pneumatic tourniquet was placed around the right upper thigh. Right lower extremity was prepped and draped in the usual sterile fashion. Tourniquet was inflated to 275. I utilized his existing midline anterior knee scar and extended a little proximal to perform a medial parapatellar arthrotomy. There was a very large bloody effusion. The patellar button was floating in the suprapatellar pouch. There was significant synovitis. A synovial fluid specimen as well as a synovial tissue specimen and later a specimen from the femoral interface membrane were all sent for culture. Quadriceps snip was performed to help facilitate exposure. Synovectomy was performed. I used a thin saw blade and flexible osteotomes to work under the medial and lateral aspect of the femur which was removed easily with no bone loss. There was significant lysis in the posteromedial and posterolateral condyles, which was removed with rongeur and curettes. Progressive medial release was performed to subluxate the tibia anteriorly. Margins of the proximal tibia were identified. I worked with a thin saw blade and flexible osteotomes under the tibial tray which easily de-bonded from the cement mantle. Remaining proximal cement mantle was fragmented and was easily removed. Metaphyseal cement was easily removed and small lytic lesion was debrided in the proximal medial tibia while the lesion was contained. Tibial canal was reamed up to 15 mm. Proximal tibia was broached for a size medium metaphyseal cone. Cone trial was inserted. Tibia was sized to an F and a size F tibial tray with a 15 x 135 stem trial were dropped into place. Femoral canal was reamed up to 17 mm for a 17 x 135 mm stem with reamer in place. Fresh distal femoral resection was made just shaving off the medial side distally and preparing for a 5-mm distal augment laterally. At this point, gaps were assessed and we were quite tight on the medial side. Significant medial release was performed.   A size 11 standard trial femur was placed and by shaving off a little of the anterolateral femoral bone to have some external rotation, I was able to create a symmetrical flexion gap. With 20-mm trial insert in place, the knee had full extension to gravity. There was satisfactory coronal plane stability in extension and in flexion, but there was fairly significant mid-flexion instability. I elected to go with the CCK insert. The posterolateral femur was cut for a 15-mm augment and on the medial side, a 5-mm posterior augment. With augments in place, the large lytic lesions for the most part contained and I elected to utilize cement to fill those voids. Trials were removed. Bony surfaces were copiously irrigated by pulse lavage and dried. I elected to cement each component with separate batch to ensure holding proper external rotation alignment on the femur during cement setup. Tibia was cemented into place first and as that was hardening up, we mixed for the second batch on the femur and once the femur was setting up, we held appropriate external rotation. Antibiotic-impregnated cement was utilized. After adequate setup time, tourniquet was released and hemostasis was obtained with the Bovie. Wound was irrigated. Periarticular soft tissues were injected with solution containing 0.5% ropivacaine with epinephrine as well as clonidine and Toradol. With 20-mm trial insert in place, there was satisfactory stability in both flexion and extension and the final 20-mm CCK insert was snapped into place and locked with a set screw. The patella had severe erosion and I elected to leave that undersurfaced. I did shave off a very large prominence, the distal medial aspect which was causing some clicking through trial range of motion. At that point, patella  tracked satisfactorily with the extensor mechanism clipped closed.   The quadriceps snip was repaired with #2 Ethibond sutures and a few Ethibond sutures were used to anchor the arthrotomy medially. Remainder of the arthrotomy was closed with a combination of heavy Vicryl sutures and a running #2 Stratafix suture. Note that before closure of the arthrotomy, 2 g of topical tranexamic acid were applied to the wound. Skin and subcutaneous layers were closed in layered fashion with Vicryl and a running Monocryl subcuticular stitch. Wound was dressed with Dermabond and Aquacel occlusive dressing as well as sterile compressive dressing. The patient's bladder was decompressed with straight catheterization before he was transported to the postanesthesia care unit in stable condition. All counts were correct at the end of the procedure. The physician assistant was critical throughout the case to assist with positioning, retraction, and closure. There were no other available residents, fellows, or surgical assistants available to assist during this procedure.         Sulema Schultz MD      [id number]  D:  04/20/2021 14:49  T:  04/20/2021 23:26  JOB #:  [id number]  CC:  MD Xiang Elizabeth MD

## 2021-04-21 NOTE — PROGRESS NOTES
CHANDA: Plan TBD. Patient plans to go home with [facility] with daughter to transport pending progress with physical therapy. Patient lives with wife and she was just admitted to [address] [place] [address]. RUR: N/A    1. Patient is from home. 2. Orthopedics, PT/OT following. 3. The patient plans to discharge home with [place]. Observation notice provided in writing to patient and/or caregiver as well as verbal explanation of the policy. Patients who are in outpatient status also receive the Observation notice. Care Management Interventions  PCP Verified by CM: Yes  Palliative Care Criteria Met (RRAT>21 & CHF Dx)?: No  Mode of Transport at Discharge: Other (see comment)  Transition of Care Consult (CM Consult): [address]: No  Reason Outside [place]: Physician referred to specific agency  MyChart Signup: No  Discharge Durable Medical Equipment: No  Health Maintenance Reviewed: Yes  Physical Therapy Consult: Yes  Occupational Therapy Consult: Yes  Speech Therapy Consult: No  Current Support Network: Lives with Spouse  Confirm Follow Up Transport: Family  The Plan for Transition of Care is Related to the Following Treatment Goals : The patient plans to discharge home with [place] and daughter to transport. The Patient and/or Patient Representative was Provided with a Choice of Provider and Agrees with the Discharge Plan?: Yes  Freedom of Choice List was Provided with Basic Dialogue that Supports the Patient's Individualized Plan of Care/Goals, Treatment Preferences and Shares the Quality Data Associated with the Providers?: Yes  [place] Resource Information Provided?: No  Discharge Location  Discharge Placement: Home with home health     Reason for Admission:  Revision Right Total Knee                      RUR Score:  N/A             Plan for utilizing home health: The Plan for Transition of Care is related to the following treatment goals:  At [address]    The Patient and/or patient representative Elkin Raymond was provided with a choice of provider and agrees   with the discharge plan. [x] Yes [] No    Freedom of choice list was provided with basic dialogue that supports the patient's individualized plan of care/goals, treatment preferences and shares the quality data associated with the providers. [x] Yes [] No       PCP: First and Last name:  Laron Calderon MD, phone: [phone]      Name of Practice:    Are you a current patient: Yes/No: Yes   Approximate date of last visit: March, 2021   Can you participate in a virtual visit with your PCP: Yes, daughter. Current Advanced Directive/Advance Care Plan: Full Code      Healthcare Decision Maker:   Click here to complete [address] including selection of the Healthcare Decision Maker Relationship (ie \"Primary\")                             Transition of Care Plan:    CM spoke with patient in room 562. Patient is alert and oriented x4. Confirmed demographics. The patient was independent with ADL's/IADL's at home using a cane. The patient lives with his wife, owns his own walker, drives a vehicle and plans to discharge home with his daughter Willie Benjamin ([phone]). The patient states his daughter will be able to stay with him for a few days to help assist him. The patient's wife was recently admitted to the hospital at [facility] and plan TBD for discharge plan. At [address] [facility] has accepted the patient for Home Health. CM following for discharge needs.     Hero Moses RN/CRM

## 2021-04-21 NOTE — PROGRESS NOTES
Bedside and Verbal shift change report given to [address] [place] [address] (oncoming nurse) by Roosevelt Umaña (offgoing nurse). Report included the following information SBAR, Kardex, Intake/Output and MAR.

## 2021-04-21 NOTE — PROGRESS NOTES
Bedside and Verbal shift change report given to Lavonne Urbina RN and Mark RN (oncoming nurse) by Andres Dorantes RN (offgoing nurse). Report included the following information SBAR, Kardex and MAR.

## 2021-04-21 NOTE — PROGRESS NOTES
Problem: Mobility Impaired (Adult and Pediatric)  Goal: *Acute Goals and Plan of Care (Insert Text)  Description: FUNCTIONAL STATUS PRIOR TO ADMISSION: (per patient): Patient was modified independent using a single point cane for functional mobility. HOME SUPPORT PRIOR TO ADMISSION: The patient lived with wife but did not require assist. Daughter available to assist as needed. Physical Therapy Goals  Initiated 4/20/2021    1. Patient will move from supine to sit and sit to supine , scoot up and down, and roll side to side in bed with modified independence within 4 days. 2. Patient will perform sit to stand with modified independence within 4 days. 3. Patient will ambulate with modified independence for 75 feet with the least restrictive device within 4 days. 4. Patient will ascend/descend 4 stairs with both handrail(s) with contact guard assist within 4 days. 5. Patient will perform home exercise program per protocol with independence within 4 days. 6. Patient will demonstrate AROM 0-90 degrees in operative joint within 4 days. 4/21/2021 1506 by En [address]  Outcome: Progressing Towards Goal   PHYSICAL THERAPY TREATMENT  Patient: Janey Wiley (87 y.o. male)  Date: 4/21/2021  Diagnosis: Instability of internal right knee prosthesis, subsequent encounter [T84.022D]  Polyethylene wear of right knee prosthesis, subsequent encounter [T84.062D]  S/P TKR (total knee replacement), right [Z96.651]  History of total knee replacement, right [Z96.651]  Polyethylene wear of right knee joint prosthesis, subsequent encounter [T84.062D] <principal problem not specified>  Procedure(s) (LRB):  REVISION RIGHT TOTAL KNEE ARTHROPLASTY (SPINAL W/BLOCK W/IV SEDATION) (Right) 1 Day Post-Op  Precautions: Fall, WBAT(Hard of Hearing)  Chart, physical therapy assessment, plan of care and goals were reviewed. ASSESSMENT  Patient continues with skilled PT services and is progressing towards goals.  Patient required less assistance for bed mobility and transfers. Ambulated farther (65 ft) with RW and gait belt. Needs to be able to negotiate 3 steps with rail in order to enter/exit patient's home as well as daughter's home. Unsure about disposition (hopefully HH PT vs SNF) as wife is in hospital and daughter may not be able to stay with him upon discharge. Anticipate 1-2 more visits prior to discharge. (Note: patient is also having issues with urinary retention). Current Level of Function Impacting Discharge (mobility/balance): Contact guard for bed mobility, transfers and gait. Other factors to consider for discharge: Motivated/A & O x 4/? Family Support due to wife in hospital/Modified independent PLOF          PLAN :  Patient continues to benefit from skilled intervention to address the above impairments. Continue treatment per established plan of care. to address goals. Recommendation for discharge: (in order for the patient to meet his/her long term goals)  Physical therapy at least 2 days/week in the home vs SNF, depending on availability of help in the home. This discharge recommendation:  Has been made in collaboration with the attending provider and/or case management    IF patient discharges home will need the following DME: patient owns DME required for discharge       SUBJECTIVE:   Patient stated I am doing okay.     OBJECTIVE DATA SUMMARY:   Critical Behavior:  Neurologic State: Alert, Appropriate for age  Orientation Level: Oriented X4  Cognition: Appropriate decision making, Appropriate for age attention/concentration, Appropriate safety awareness, Follows commands     Functional Mobility Training:  Bed Mobility:     Supine to Sit: Contact guard assistance  Sit to Supine: Contact guard assistance  Scooting: Contact guard assistance        Transfers:  Sit to Stand: Contact guard assistance(cues for proper safety technique with RW)  Stand to Sit: Setup        Bed to Chair: Contact guard assistance;Minimum assistance                    Balance:  Sitting: Intact  Standing: Intact; With support  Standing - Static: Good;Constant support  Standing - Dynamic : Fair;Constant support  Ambulation/Gait Training:  Distance (ft): 65 Feet (ft)  Assistive Device: Walker, rolling;Gait belt  Ambulation - Level of Assistance: Contact guard assistance(cues to slow down and sty close to walker)        Gait Abnormalities: Antalgic(slightly forward flexed, pushin walker too far in front of h)  Right Side Weight Bearing: As tolerated     Base of Support: Widened;Shift to left  Stance: Right decreased  Speed/Angela: Slow;Shuffled  Step Length: Left shortened  Swing Pattern: Right asymmetrical          Therapeutic Exercises: Ankle Pumps  Ham Sets  Quad Sets  Heel Slides  X 10 each every hour     Pain Ratin/10    Activity Tolerance:   Good    After treatment patient left in no apparent distress:   Sitting in chair, Call bell within reach, Side rails x 3, and nurse notified. COMMUNICATION/COLLABORATION:   The patients plan of care was discussed with: Registered nurse and Case management.      Kassandra Ped   Time Calculation: 30 mins

## 2021-04-21 NOTE — PROGRESS NOTES
Bedside and Verbal shift change report given to Salma Glover RN (oncoming nurse) by Medina Littlejohn RN and Saige Rogers RN (offgoing nurse). Report included the following information SBAR, Kardex, Procedure Summary, Intake/Output, MAR and Recent Results.

## 2021-04-21 NOTE — PROGRESS NOTES
Problem: Mobility Impaired (Adult and Pediatric)  Goal: *Acute Goals and Plan of Care (Insert Text)  Description: FUNCTIONAL STATUS PRIOR TO ADMISSION: (per patient): Patient was modified independent using a single point cane for functional mobility. HOME SUPPORT PRIOR TO ADMISSION: The patient lived with wife but did not require assist. Daughter available to assist as needed. Physical Therapy Goals  Initiated 4/20/2021    1. Patient will move from supine to sit and sit to supine , scoot up and down, and roll side to side in bed with modified independence within 4 days. 2. Patient will perform sit to stand with modified independence within 4 days. 3. Patient will ambulate with modified independence for 75 feet with the least restrictive device within 4 days. 4. Patient will ascend/descend 4 stairs with both handrail(s) with contact guard assist within 4 days. 5. Patient will perform home exercise program per protocol with independence within 4 days. 6. Patient will demonstrate AROM 0-90 degrees in operative joint within 4 days. Outcome: Progressing Towards Goal   PHYSICAL THERAPY MORNING SESSION NOTE  Patient: Yara Parsons (66 y.o. male)  Date: 4/21/2021  Primary Diagnosis: Instability of internal right knee prosthesis, subsequent encounter [T84.022D]  Polyethylene wear of right knee prosthesis, subsequent encounter [T84.062D]  S/P TKR (total knee replacement), right [Z96.651]  History of total knee replacement, right [Z96.651]  Polyethylene wear of right knee joint prosthesis, subsequent encounter [T84.062D]  Procedure(s) (LRB):  REVISION RIGHT TOTAL KNEE ARTHROPLASTY (SPINAL W/BLOCK W/IV SEDATION) (Right) 1 Day Post-Op   Precautions:  Fall, WBAT(Hard of Hearing)    Yara Parsons was seen for morning PT session. He is only walking 35 feet at contact guard-minimal level of assistance and with a RW and gait belt. The primary limiting factors are pain.  Currently, expect Yara Parsons will need 2-3 more session(s) to meet the therapy goals in preparation for returning home. The full therapy note will follow after this afternoon's session. Morning session functional mobility:  Bed Mobility:     Supine to Sit: Minimum assistance; Additional time(to manage LEs and sit up)  Sit to Supine: (left up in chair)     Transfers:  Sit to Stand: Contact guard assistance  Stand to Sit: Contact guard assistance        Bed to Chair: Contact guard assistance;Minimum assistance              Balance:   Sitting: Intact  Standing: Impaired; Without support  Standing - Static: Fair;Constant support  Standing - Dynamic : Fair;Constant support  Ambulation/Gait Training:  Distance (ft): 35 Feet (ft)  Assistive Device: Walker, rolling;Gait belt  Ambulation - Level of Assistance: Contact guard assistance;Minimal assistance        Gait Abnormalities: Antalgic  Right Side Weight Bearing: As tolerated     Base of Support: Widened;Shift to left  Stance: Right decreased  Speed/Angela: Pace decreased (<100 feet/min)  Step Length: Left shortened  Swing Pattern: Right asymmetrical                    Thank you for this referral.  Timothy Hein   Time Calculation: 30 mins

## 2021-04-21 NOTE — PROGRESS NOTES
Orthopaedics Daily Progress Note                            Date of Surgery:  4/20/2021      Patient: Belkys Crandall   YOB: 1928  Age: 80 y.o. SUBJECTIVE:   1 Day Post-Op following REVISION RIGHT TOTAL KNEE ARTHROPLASTY (SPINAL W/BLOCK W/IV SEDATION). The patient's post operative pain is controlled. No CP/SOB. No N/V. The patient's mobility will be evaluated today during PT sessions. OBJECTIVE:     Vital Signs:      Visit Vitals  /60 (BP 1 Location: Right upper arm, BP Patient Position: At rest;Lying right side)   Pulse 65   Temp 97.6 °F (36.4 °C)   Resp 16   Ht 5' 8\" (1.727 m)   Wt 88.6 kg (195 lb 5.2 oz)   SpO2 95%   BMI 29.70 kg/m²       Physical Exam:  General: A&Ox3. The patient is cooperative, and in no acute distress. Respiratory: Respirations are unlabored. Surgical site(s): dressing clean, dry  Musculoskeletal: Calves are soft, supple, and non-tender upon palpation. Motor 5/5. Neurological:  Neurovascularly intact with good dorsi and plantar flexion.     Pulses symmetrical.    Laboratory Values:             Recent Results (from the past 12 hour(s))   METABOLIC PANEL, BASIC    Collection Time: 04/21/21  3:46 AM   Result Value Ref Range    Sodium 139 136 - 145 mmol/L    Potassium 4.3 3.5 - 5.1 mmol/L    Chloride 107 97 - 108 mmol/L    CO2 26 21 - 32 mmol/L    Anion gap 6 5 - 15 mmol/L    Glucose 120 (H) 65 - 100 mg/dL    BUN 21 (H) 6 - 20 MG/DL    Creatinine 1.45 (H) 0.70 - 1.30 MG/DL    BUN/Creatinine ratio 14 12 - 20      GFR est AA 55 (L) >60 ml/min/1.73m2    GFR est non-AA 46 (L) >60 ml/min/1.73m2    Calcium 8.1 (L) 8.5 - 10.1 MG/DL   HEMOGLOBIN    Collection Time: 04/21/21  3:46 AM   Result Value Ref Range    HGB 8.5 (L) 12.1 - 17.0 g/dL   PROTHROMBIN TIME + INR    Collection Time: 04/21/21  3:46 AM   Result Value Ref Range    INR 1.3 (H) 0.9 - 1.1      Prothrombin time 13.1 (H) 9.0 - 11.1 sec         PLAN:     S/P REVISION RIGHT TOTAL KNEE ARTHROPLASTY (SPINAL W/BLOCK W/IV SEDATION) -Continue WBAT. -Mobilize and continue with PT/OT until discharged     Hemodynamics Hgb today is 8.5. Acute blood loss anemia as expected. Patient asymptomatic. Continue to monitor. Wound Monitor postop dressing; no postop dressing changes necessary. Reinforce PRN. Post Operative Pain Pain Control: stable, mild-to-moderate joint symptoms intermittently, reasonably well controlled by current meds. DVT Prophylaxis Continue with SCD'S, Ankle Pump Exercises. Lovenox to coumadin      Discharge Disposition Discharge plan: Home with HHPT pending PT clearance.        Signed By: Colleen Cooper PA-C  April 21, 2021 8:12 AM

## 2021-04-21 NOTE — PROGRESS NOTES
Pharmacist Note - Warfarin Dosing  Consult provided for this 80 y.o. male to manage warfarin for h/o DVT/afib, now s/p orthopedic surgery  INR Goal: 2-3    Home regimen:  5mg MWFSS + 2.5mg TR    Drugs that may increase INR: Amiodarone  Drugs that may decrease INR: None  Other current anticoagulants/ drugs that may increase bleeding risk: Lovenox  Risk factors: Age > 65  Daily INR ordered: YES    Recent Labs     04/21/21  0346 04/20/21  1021   HGB 8.5*  --    INR 1.3* 1.3*       Date               INR                 Dose  4/20  1.3  2.5 mg  4/21                 1.3                  5 mg    Assessment/ Plan: Will order warfarin 5 mg PO x 1 dose today. Pharmacy will continue to monitor daily and adjust therapy as indicated.

## 2021-04-22 LAB
INR PPP: 1.3 (ref 0.9–1.1)
PROTHROMBIN TIME: 13.8 SEC (ref 9–11.1)

## 2021-04-22 PROCEDURE — 97530 THERAPEUTIC ACTIVITIES: CPT

## 2021-04-22 PROCEDURE — 36415 COLL VENOUS BLD VENIPUNCTURE: CPT

## 2021-04-22 PROCEDURE — 51798 US URINE CAPACITY MEASURE: CPT

## 2021-04-22 PROCEDURE — 85610 PROTHROMBIN TIME: CPT

## 2021-04-22 PROCEDURE — 74011000250 HC RX REV CODE- 250: Performed by: UROLOGY

## 2021-04-22 PROCEDURE — 74011250636 HC RX REV CODE- 250/636: Performed by: PHYSICIAN ASSISTANT

## 2021-04-22 PROCEDURE — 74011250637 HC RX REV CODE- 250/637: Performed by: PHYSICIAN ASSISTANT

## 2021-04-22 PROCEDURE — 97110 THERAPEUTIC EXERCISES: CPT

## 2021-04-22 PROCEDURE — 97116 GAIT TRAINING THERAPY: CPT

## 2021-04-22 PROCEDURE — 77030005513 HC CATH URETH FOL11 MDII -B

## 2021-04-22 RX ORDER — WARFARIN SODIUM 5 MG/1
5 TABLET ORAL ONCE
Status: COMPLETED | OUTPATIENT
Start: 2021-04-22 | End: 2021-04-22

## 2021-04-22 RX ORDER — LIDOCAINE HYDROCHLORIDE 20 MG/ML
JELLY TOPICAL ONCE
Status: COMPLETED | OUTPATIENT
Start: 2021-04-22 | End: 2021-04-22

## 2021-04-22 RX ORDER — ENOXAPARIN SODIUM 100 MG/ML
40 INJECTION SUBCUTANEOUS DAILY
Qty: 2 SYRINGE | Refills: 0 | Status: SHIPPED
Start: 2021-04-22 | End: 2021-04-23

## 2021-04-22 RX ADMIN — WARFARIN SODIUM 5 MG: 5 TABLET ORAL at 12:19

## 2021-04-22 RX ADMIN — OXYCODONE HYDROCHLORIDE 5 MG: 5 TABLET ORAL at 19:14

## 2021-04-22 RX ADMIN — LIDOCAINE HYDROCHLORIDE: 20 JELLY TOPICAL at 21:48

## 2021-04-22 RX ADMIN — OXYCODONE HYDROCHLORIDE 5 MG: 5 TABLET ORAL at 16:09

## 2021-04-22 RX ADMIN — ACETAMINOPHEN 500 MG: 500 TABLET ORAL at 08:10

## 2021-04-22 RX ADMIN — ATORVASTATIN CALCIUM 20 MG: 20 TABLET, FILM COATED ORAL at 09:42

## 2021-04-22 RX ADMIN — AMIODARONE HYDROCHLORIDE 100 MG: 200 TABLET ORAL at 09:42

## 2021-04-22 RX ADMIN — ENOXAPARIN SODIUM 40 MG: 40 INJECTION SUBCUTANEOUS at 09:05

## 2021-04-22 RX ADMIN — ACETAMINOPHEN 500 MG: 500 TABLET ORAL at 22:51

## 2021-04-22 RX ADMIN — ACETAMINOPHEN 500 MG: 500 TABLET ORAL at 16:09

## 2021-04-22 RX ADMIN — ACETAMINOPHEN 500 MG: 500 TABLET ORAL at 09:42

## 2021-04-22 RX ADMIN — OXYCODONE HYDROCHLORIDE 5 MG: 5 TABLET ORAL at 10:37

## 2021-04-22 RX ADMIN — SODIUM CHLORIDE 125 ML/HR: 9 INJECTION, SOLUTION INTRAVENOUS at 03:30

## 2021-04-22 RX ADMIN — DOCUSATE SODIUM 50 MG AND SENNOSIDES 8.6 MG 1 TABLET: 8.6; 5 TABLET, FILM COATED ORAL at 09:42

## 2021-04-22 RX ADMIN — TAMSULOSIN HYDROCHLORIDE 0.4 MG: 0.4 CAPSULE ORAL at 09:42

## 2021-04-22 RX ADMIN — POLYETHYLENE GLYCOL 3350 17 G: 17 POWDER, FOR SOLUTION ORAL at 09:42

## 2021-04-22 RX ADMIN — OXYCODONE HYDROCHLORIDE 5 MG: 5 TABLET ORAL at 22:51

## 2021-04-22 NOTE — CONSULTS
Requesting Provider: Grace Velasco MD - Reason for Consultation: \"urinay retention and hematuria\"  Pre-existing [facility] Patient:   No                Patient: Milton Luz MRN: [id number]  SSN: [id number]    YOB: 1928  Age: 80 y.o. Sex: male     Location: [facility]       Code Status: Full Code   PCP: Latisha Hernandez MD  - [phone]   Emergency Contact:  Primary Emergency Contact: Caron Paulson, Home Phone: [phone]   Race/Presybeterian/Language: Tashi Reed / Jose Antonio Torres / Kaiden Macias   Payor: Payor: Nasreen Mckenzie / Plan: Kannan Gonzalez / Product Type: Medicare /    Prior Admission Data: 2/17/20 [address]   Hospitalized:  Hospital Day: 3 - Admitted 4/20/2021  8:10 AM   POD # 2 Days Post-Op Procedure(s):  REVISION RIGHT TOTAL KNEE ARTHROPLASTY (SPINAL W/BLOCK W/IV SEDATION) by Grace Velasco MD - Blood Loss: * No values recorded between 4/20/2021 11:50 AM and 4/20/2021  3:04 PM * 3 Hr 62 Min 7 Sec     CONSULTANTS  IP CONSULT TO UROLOGY   ADMISSION DIAGNOSES    ICD-10-CM ICD-9-CM   1. 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. Will arrange. [phone]  ---scheduled 04/26/21 at 9 am with Dr. Jarred Patel at the [facility] location    Supervising Dr. Breanna BERMAN       CC: No chief complaint on file. HPI: 80yoM. POD2 REVISION RIGHT TOTAL KNEE ARTHROPLASTY (SPINAL W/BLOCK W/IV SEDATION)    [facility] was consulted regarding hematuria and post operative urinary retention with ag. Patient has never been seen before by [facility]. Ag in place, UOP 1200cc. Recent bladder scan 463cc    Patient reports he had his prostate removed 25 years ago d/t prostate cancer. Does not currently follow with a urologist. RN had difficulty placing ag. 16 fr coude placed with immediate red UA. I irrigated with roughly 150cc to more clear red UA. No clots expressed. Currently on daily warfarin. Afebrile, 177/85  Hgb: 8.5 from 11.7 (21)  Cr: 1.45  INR: 1.3  UA: clear (21)  +flomax  +warfarin      Temp (24hrs), Av.2 °F (36.8 °C), Min:97.8 °F (36.6 °C), Max:98.5 °F (36.9 °C)    Urinary Status: Has not voided  Creatinine   Date/Time Value Ref Range Status   2021 03:46 AM 1.45 (H) 0.70 - 1.30 MG/DL Final   2021 11:27 AM 1.34 (H) 0.70 - 1.30 MG/DL Final   2020 05:38 AM 1.15 0.70 - 1.30 MG/DL Final   2020 04:51 AM 0.94 0.70 - 1.30 MG/DL Final   02/15/2020 01:25 AM 1.11 0.70 - 1.30 MG/DL Final     Current Antimicrobial Therapy (168h ago, onward)    None        Key Anti-Platelet Anticoagulant Meds             enoxaparin (Lovenox) 40 mg/0.4 mL (Taking) 0.4 mL by SubCUTAneous route daily. Indications: deep vein thrombosis prevention    warfarin sodium (COUMADIN PO) (Discontinued) Take 7.5 mg by mouth five (5) days a week. Tuesday, Wednesday, Thursday, Saturday &     warfarin (COUMADIN) 5 mg tablet (Discontinued) Take 5 mg by mouth every Monday. warfarin (COUMADIN) 5 mg tablet (Discontinued) Take 5 mg by mouth Every Friday.         Diet: DIET ONE TIME MESSAGE -       Labs     Lab Results   Component Value Date/Time    WBC 6.2 2021 11:27 AM    HCT 36.9 2021 11:27 AM    PLATELET 827  11:27 AM    Sodium 139 2021 03:46 AM    Potassium 4.3 2021 03:46 AM    Chloride 107 2021 03:46 AM    CO2 26 2021 03:46 AM    BUN 21 (H) 2021 03:46 AM    Creatinine 1.45 (H) 2021 03:46 AM    Glucose 120 (H) 2021 03:46 AM    Calcium 8.1 (L) 2021 03:46 AM    Magnesium 2.3 2020 08:12 PM    INR 1.3 (H) 04/22/2021 03:21 AM     UA:   Lab Results   Component Value Date/Time    Color YELLOW/STRAW 04/12/2021 11:27 AM    Appearance CLEAR 04/12/2021 11:27 AM    Specific gravity 1.013 04/12/2021 11:27 AM    pH (UA) 5.0 04/12/2021 11:27 AM    Protein Negative 04/12/2021 11:27 AM    Glucose Negative 04/12/2021 11:27 AM    Ketone Negative 04/12/2021 11:27 AM    Bilirubin Negative 04/12/2021 11:27 AM    Urobilinogen 1.0 04/12/2021 11:27 AM    Nitrites Negative 04/12/2021 11:27 AM    Leukocyte Esterase Negative 04/12/2021 11:27 AM    Epithelial cells FEW 04/12/2021 11:27 AM    Bacteria Negative 04/12/2021 11:27 AM    WBC 0-4 04/12/2021 11:27 AM    RBC 0-5 04/12/2021 11:27 AM     Imaging     No results found for this or any previous visit. US Results (most recent):  No results found for this or any previous visit. Cultures     All Micro Results     Procedure Component Value Units Date/Time    CULTURE, ANAEROBIC [[id number]] Collected: 04/20/21 1213    Order Status: Completed Specimen: Knee  Updated: 04/22/21 0954     Special Requests: FEMORAL INTERFACE MEMBRANE     Culture result:       No growth thus far, holding 14 days. CULTURE, TISSUE CONSTANTINO Malinda Al [[id number]] Collected: 04/20/21 1213    Order Status: Completed Specimen: Knee  Updated: 04/22/21 0954     Special Requests: FEMORAL INTERFACE MEMBRANE     GRAM STAIN RARE WBCS SEEN         NO ORGANISMS SEEN        Culture result:       No growth thus far, holding 14 days. CULTURE, ANAEROBIC [[id number]] Collected: 04/20/21 1157    Order Status: Completed Specimen: Knee  Updated: 04/22/21 0953     Special Requests: RIGHT KNEE SYNOVIUM     Culture result:       No growth thus far, holding 14 days.           CULTURE, TISSUE Alicia Wallace [[id number]] Collected: 04/20/21 1157    Order Status: Completed Specimen: Knee  Updated: 04/22/21 0952     Special Requests: RIGHT KNEE SYNOVIUM     GRAM STAIN RARE WBCS SEEN         NO ORGANISMS SEEN        Culture result: No growth thus far, holding 14 days. CULTURE, ANAEROBIC [[id number]] Collected: 04/20/21 1154    Order Status: Completed Specimen: Knee  Updated: 04/22/21 0949     Special Requests: RIGHT KNEE SYNOVIAL FLUID     Culture result:       No growth thus far, holding 14 days. CULTURE, BODY FLUID Flex Carpenter [[id number]] Collected: 04/20/21 1154    Order Status: Completed Specimen: Knee  Updated: 04/22/21 0949     Special Requests: RIGHT KNEE SYNOVIAL FLUID     GRAM STAIN RARE WBCS SEEN         NO ORGANISMS SEEN        Culture result:       Culture performed on Fluid swab specimen                  No growth thus far, holding 14 days.                  Past History: (Complete 2+/3 areas)   No Known Allergies   Current Facility-Administered Medications   Medication Dose Route Frequency    warfarin (COUMADIN) tablet 5 mg  5 mg Oral ONCE    tamsulosin (FLOMAX) capsule 0.4 mg  0.4 mg Oral DAILY    amiodarone (CORDARONE) tablet 100 mg  100 mg Oral DAILY    atorvastatin (LIPITOR) tablet 20 mg  20 mg Oral DAILY    0.9% sodium chloride infusion  125 mL/hr IntraVENous CONTINUOUS    sodium chloride (NS) flush 5-40 mL  5-40 mL IntraVENous Q8H    sodium chloride (NS) flush 5-40 mL  5-40 mL IntraVENous PRN    acetaminophen (TYLENOL) tablet 500 mg  500 mg Oral Q4HWA    oxyCODONE IR (ROXICODONE) tablet 2.5 mg  2.5 mg Oral Q3H PRN    oxyCODONE IR (ROXICODONE) tablet 5 mg  5 mg Oral Q3H PRN    naloxone (NARCAN) injection 0.4 mg  0.4 mg IntraVENous PRN    hydrOXYzine HCL (ATARAX) tablet 10 mg  10 mg Oral Q8H PRN    senna-docusate (PERICOLACE) 8.6-50 mg per tablet 1 Tab  1 Tab Oral BID    polyethylene glycol (MIRALAX) packet 17 g  17 g Oral DAILY    bisacodyL (DULCOLAX) suppository 10 mg  10 mg Rectal DAILY PRN    enoxaparin (LOVENOX) injection 40 mg  40 mg SubCUTAneous DAILY    Warfarin - pharmacy dosing   Other Rx Dosing/Monitoring    Prior to Admission medications    Medication Sig Start Date End Date Taking? Authorizing Provider   enoxaparin (Lovenox) 40 mg/0.4 mL 0.4 mL by SubCUTAneous route daily. Indications: deep vein thrombosis prevention 4/22/21  Yes Vy Nguyễn PA-C   oxyCODONE IR (ROXICODONE) 5 mg immediate release tablet Take 0.5-1 Tabs by mouth every four (4) hours as needed for Pain for up to 7 days. Max Daily Amount: 30 mg. 4/21/21 4/28/21 Yes Vy Nguyễn PA-C   senna-docusate (PERICOLACE) 8.6-50 mg per tablet Take 1 Tab by mouth two (2) times daily as needed for Constipation. 4/21/21  Yes Munira Nguyễn PA-C   acetaminophen (TYLENOL) 500 mg tablet Take 1 Tab by mouth every four (4) hours. 4/21/21  Yes Munira Nguyễn PA-C   amiodarone (PACERONE) 100 mg tablet Take 100 mg by mouth daily. Yes Provider, Historical   furosemide (LASIX) 40 mg tablet Take 40 mg by mouth daily. Yes Provider, Historical   atorvastatin (LIPITOR) 20 mg tablet Take 1 Tab by mouth daily. Indications: high cholesterol 2/17/20  Yes Stephen Hazel MD   vit C/E/Zn/coppr/lutein/zeaxan (PRESERVISION AREDS-2 PO) Take  by mouth daily. Yes Other, MD Clement   latanoprost (XALATAN) 0.005 % ophthalmic solution Administer 1 Drop to both eyes nightly. Yes Other, MD Clement   cholecalciferol (VITAMIN D3) (1000 Units /25 mcg) tablet Take 1,000 Units by mouth daily. Yes Other, MD Clement   warfarin (Coumadin) 5 mg tablet Take 5 mg by mouth daily. Indications: MON WED FRI SAT SUN    Provider, Historical   warfarin (Coumadin) 2.5 mg tablet Take 2.5 mg by mouth daily. Indications: TUES AND THURS    Provider, Historical   furosemide (Lasix) 20 mg tablet Take  by mouth nightly. Provider, Historical   celecoxib (CeleBREX) 200 mg capsule Take 200 mg by mouth daily. Provider, Historical        PMHx:  has a past medical history of Arthritis, Cancer ([facility] Chronic pain, History of colonoscopy with polypectomy (2005~), Hypercholesterolemia, Other ill-defined conditions(799.89), and [facility] PSurgHx:  has a past surgical history that includes hx mohs procedure (1995); hx other surgical (1996); pr total hip arthroplasty; pr total knee arthroplasty; hx knee replacement (Left); hx rotator cuff repair (Right); hx prostatectomy (age 72); hx cataract removal (Bilateral); and hx tonsillectomy. PSocHx:  reports that he has quit smoking. He has never used smokeless tobacco. He reports current alcohol use. He reports that he does not use drugs. ROS:  (Complete - 10 systems) - DENIES: Weightloss (Constitutional), Dry mouth (ENMT), Chest pain (CV), SOB (Respiratory), Constipation (GI), Weakness (MS), Pallor (Skin), TIA Sx (Neuro), Confusion (Psych), Easy bruising (Heme)    Physical Exam: (Comprehesive - 8+ 1995 Systems)     (1) Constitutional:  FIO2:   on SpO2: O2 Sat (%): 93 %  O2 Device: None (Room air) O2 Flow Rate (L/min): 2 l/min  Patient Vitals for the past 24 hrs:   BP Temp Pulse Resp SpO2   04/22/21 0828 (!) 177/85 98 °F (36.7 °C) 80 18 93 %   04/22/21 0258 134/65 98.5 °F (36.9 °C) 78 17 93 %   04/21/21 2050 (!) 137/57 97.8 °F (36.6 °C) 78 16 95 %   04/21/21 1520 (!) 146/65 98.3 °F (36.8 °C) 67 17 95 %       Date 04/21/21 0700 - 04/22/21 0659 04/22/21 0700 - 04/23/21 0659   Shift 0837-3291 4684-6623 24 Hour Total 6876-2910 3835-2653 24 Hour Total   INTAKE   P.O. 240  240        P. O. 240  240      Shift Total(mL/kg) 240(2.7)  240(2.7)      OUTPUT   Urine(mL/kg/hr) 600(0.6) 600(0.6) 1200(0.6)        Urine       Shift Total(mL/kg) 600(6.8) 600(6.8) 1200(13.5)      NET -360 -600 -960      Weight (kg) 88.6 88.6 88.6 88.6 88.6 88.6                                                         Signed By: Derik Stoddard NP  - April 22, 2021

## 2021-04-22 NOTE — PROGRESS NOTES
Bedside and Verbal shift change report given to Verito Vaughan RN (oncoming nurse) by JAKI Fagan (offgoing nurse). Report included the following information SBAR, Kardex and MAR.

## 2021-04-22 NOTE — PROGRESS NOTES
Pharmacist Note - Warfarin Dosing  Consult provided for this 80 y.o. male to manage warfarin for h/o DVT/afib, now s/p orthopedic surgery  INR Goal: 2-3    Home regimen:  5mg MWFSS + 2.5mg TR    Drugs that may increase INR: Amiodarone  Drugs that may decrease INR: None  Other current anticoagulants/ drugs that may increase bleeding risk: Lovenox  Risk factors: Age > 65  Daily INR ordered: YES    Recent Labs     04/22/21  0321 04/21/21  0346 04/20/21  1021   HGB  --  8.5*  --    INR 1.3* 1.3* 1.3*       Date               INR                 Dose  4/20  1.3  2.5 mg  4/21                 1.3                  5 mg  4/22  1.3  5 mg    Assessment/ Plan: Will order warfarin 5 mg PO x 1 dose today. Pharmacy will continue to monitor daily and adjust therapy as indicated.

## 2021-04-22 NOTE — PROGRESS NOTES
Orthopaedics Daily Progress Note                            Date of Surgery:  4/20/2021      Patient: Angella Palmer   YOB: 1928  Age: 80 y.o. SUBJECTIVE:   2 Days Post-Op following REVISION RIGHT TOTAL KNEE ARTHROPLASTY (SPINAL W/BLOCK W/IV SEDATION). The patient's post operative pain is controlled. No CP/SOB. No N/V. The patient's mobility will be evaluated today during PT sessions. Has not been able to void yet, will try again with PT today while standing. OBJECTIVE:     Vital Signs:      Visit Vitals  BP (!) 177/85 (BP 1 Location: Right upper arm, BP Patient Position: Sitting) Comment: RN notified   Pulse 80   Temp 98 °F (36.7 °C)   Resp 18   Ht 5' 8\" (1.727 m)   Wt 88.6 kg (195 lb 5.2 oz)   SpO2 93%   BMI 29.70 kg/m²       Physical Exam:  General: A&Ox3. The patient is cooperative, and in no acute distress. Respiratory: Respirations are unlabored. Surgical site(s): dressing clean, dry  Musculoskeletal: Calves are soft, supple, and non-tender upon palpation. Motor 5/5. Neurological:  Neurovascularly intact with good dorsi and plantar flexion. Pulses symmetrical.    Laboratory Values:             Recent Results (from the past 12 hour(s))   PROTHROMBIN TIME + INR    Collection Time: 04/22/21  3:21 AM   Result Value Ref Range    INR 1.3 (H) 0.9 - 1.1      Prothrombin time 13.8 (H) 9.0 - 11.1 sec         PLAN:     S/P REVISION RIGHT TOTAL KNEE ARTHROPLASTY (SPINAL W/BLOCK W/IV SEDATION) -Continue WBAT. -Mobilize and continue with PT/OT until discharged     Hemodynamics  Acute blood loss anemia as expected. Patient asymptomatic. Continue to monitor. Wound Monitor postop dressing; no postop dressing changes necessary. Reinforce PRN. Post Operative Pain Pain Control: stable, mild-to-moderate joint symptoms intermittently, reasonably well controlled by current meds. DVT Prophylaxis Continue with SCD'S, Ankle Pump Exercises.  Lovenox to coumadin bridge Discharge Disposition Discharge plan: Home vs SNF, pending PT recs.        Signed By: Jaiden Gama PA-C  April 22, 2021 9:49 AM

## 2021-04-22 NOTE — PROGRESS NOTES
Problem: Mobility Impaired (Adult and Pediatric)  Goal: *Acute Goals and Plan of Care (Insert Text)  Description: FUNCTIONAL STATUS PRIOR TO ADMISSION: (per patient): Patient was modified independent using a single point cane for functional mobility. HOME SUPPORT PRIOR TO ADMISSION: The patient lived with wife but did not require assist. Daughter available to assist as needed. Physical Therapy Goals  Initiated 4/20/2021    1. Patient will move from supine to sit and sit to supine , scoot up and down, and roll side to side in bed with modified independence within 4 days. 2. Patient will perform sit to stand with modified independence within 4 days. 3. Patient will ambulate with modified independence for 75 feet with the least restrictive device within 4 days. 4. Patient will ascend/descend 4 stairs with both handrail(s) with contact guard assist within 4 days. 5. Patient will perform home exercise program per protocol with independence within 4 days. 6. Patient will demonstrate AROM 0-90 degrees in operative joint within 4 days. 4/22/2021 1612 by Usman Coffey  Outcome: Progressing Towards Goal   PHYSICAL THERAPY AFTERNOON SESSION NOTE  Patient: Sintia Coker (70 y.o. 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 His right knee continues to be very warm and swollen. He complains of pain when we begin moving, but has not had pain meds in 5 hours. Showed patient the white board where oxycodone is documented and (again) instructed him that he MUST request pain meds, they are not automatically given. Instructed patient to stay ahead of the pain in order to fully participate in mobility. RN came in with meds after PT session and re-iterated our conversation. Patient required moderate assistance this afternoon with all mobility, especially sit-stand. He only walked 5 feet at min-mod level of assistance and with a RW and gait belt . The primary limiting factors are pain and swelling Currently, layton Whitehead will need 2-3 more session(s) to meet the therapy goals in preparation for returning home. Afternoon session functional mobility:  Bed Mobility:     Supine to Sit: Moderate assistance  Sit to Supine: Moderate assistance  Scooting: Moderate assistance  Transfers:  Sit to Stand: Moderate assistance  Stand to Sit: Minimum assistance        Bed to Chair: Moderate assistance              Balance:   Sitting: Intact  Standing: Impaired; With support  Standing - Static: Fair;Constant support  Standing - Dynamic : Poor;Constant support  Ambulation/Gait Training:  Distance (ft): 5 Feet (ft)  Assistive Device: Walker, rolling;Gait belt  Ambulation - Level of Assistance: Minimal assistance; Moderate assistance        Gait Abnormalities: Antalgic; Shuffling gait  Right Side Weight Bearing: As tolerated(right knee flexed with stance)     Base of Support: Widened;Shift to left  Stance: Right decreased(right knee flexed in stance)  Speed/Angela: Pace decreased (<100 feet/min)  Step Length: Right shortened;Left shortened  Swing Pattern: Right asymmetrical;Left asymmetrical     Interventions: Safety awareness training;Verbal cues        Stairs:              Thank you for this referral.  Roverto Wright   Time Calculation: 30 mins

## 2021-04-22 NOTE — PROGRESS NOTES
Problem: Mobility Impaired (Adult and Pediatric)  Goal: *Acute Goals and Plan of Care (Insert Text)  Description: FUNCTIONAL STATUS PRIOR TO ADMISSION: (per patient): Patient was modified independent using a single point cane for functional mobility. HOME SUPPORT PRIOR TO ADMISSION: The patient lived with wife but did not require assist. Daughter available to assist as needed. Physical Therapy Goals  Initiated 4/20/2021    1. Patient will move from supine to sit and sit to supine , scoot up and down, and roll side to side in bed with modified independence within 4 days. 2. Patient will perform sit to stand with modified independence within 4 days. 3. Patient will ambulate with modified independence for 75 feet with the least restrictive device within 4 days. 4. Patient will ascend/descend 4 stairs with both handrail(s) with contact guard assist within 4 days. 5. Patient will perform home exercise program per protocol with independence within 4 days. 6. Patient will demonstrate AROM 0-90 degrees in operative joint within 4 days. Outcome: Not Progressing Towards Goal   PHYSICAL THERAPY TREATMENT  Patient: Milton Lzu (12 y.o. 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 ASSESSMENT  Patient continues with skilled PT services and is not progressing towards goals.  Today patient presents lying in bed with RLE externally rotated and R knee flexed. An attempt had been made to place pillow at lateral knee for neutral positioning, but it proved to be ineffective. Patient is complaining of increased pain today and is noted to have increased inflammation in right knee. Reminded patient to ask for pain medication WHEN NEEDED, as it is ordered that way. Patient did NOT mobilize well this morning. Began session with cued quad sets, performed 5 sets of 10, to encourage extension. Quad contraction was weak at first, but then improved with re-education. He required minimal assistance of for all mobility and had difficulty managing RLE. Patient stood at [facility] with gait belt, attempted to urinate in standing. Patient was unsuccessful and stated that he had \"no urge\" to urinate. Patient transferred to recliner at bedside and RLE stretched out with pillow under HEEL. Edwards roll for neutral positioning, ice packs. Will see again this pm after pain meds given and attempt to ambulate. Anticipate 2-3 more PT sessions prior to discharge. Current Level of Function Impacting Discharge (mobility/balance): Minimal assistance with all mobility    Other factors to consider for discharge: Urinary retention/Only assistance at home is daughter/wife is currently in hospital         PLAN :  Patient continues to benefit from skilled intervention to address the above impairments. Continue treatment per established plan of care. to address goals. Recommendation for discharge: (in order for the patient to meet his/her long term goals)  Physical therapy at least 2 days/week in the home AND ensure assist and/or supervision for safety with all ADLs and mobility.     This discharge recommendation:  A follow-up discussion with the attending provider and/or case management is planned    IF patient discharges home will need the following DME: patient owns DME required for discharge       SUBJECTIVE:   Patient stated I can't do much with it hurting like this today.     OBJECTIVE DATA SUMMARY:   Critical Behavior:  Neurologic State: Alert, Appropriate for age  Orientation Level: Oriented X4  Cognition: Follows commands, Appropriate decision making     Functional Mobility Training:  Bed Mobility:     Supine to Sit: Minimum assistance  Sit to Supine: Minimum assistance  Scooting: Minimum assistance        Transfers:  Sit to Stand: Minimum assistance; Adaptive equipment(RW)  Stand to Sit: Contact guard assistance; Adaptive equipment(RW)        Bed to Chair: Minimum assistance; Adaptive equipment(RW)                    Balance:  Sitting: Intact  Standing: Impaired; With support  Standing - Static: Fair;Constant support  Standing - Dynamic : Poor;Constant support  Ambulation/Gait Training:  Distance (ft): 3 Feet (ft)  Assistive Device: Walker, rolling;Gait belt  Ambulation - Level of Assistance: Minimal assistance; Adaptive equipment(RW & GAIT BELT)        Gait Abnormalities: Antalgic; Shuffling gait(could barely make any steps)  Right Side Weight Bearing: As tolerated     Base of Support: Widened;Shift to left  Stance: Right decreased  Speed/Angela: Pace decreased (<100 feet/min); Shuffled  Step Length: Right shortened;Left shortened  Swing Pattern: Right asymmetrical;Left asymmetrical     Interventions: Safety awareness training;Verbal cues             Therapeutic Exercises:   Began session with cued quad sets, performed 5 sets of 10, to encourage extension. Quad contraction was weak at first, but then improved with re-education. Pain Ratin/10 (requested pain meds from nurse)    Activity Tolerance:   Poor-due to pain    After treatment patient left in no apparent distress:   Sitting in chair, Heels elevated for pressure relief, Call bell within reach, and nurse notified. COMMUNICATION/COLLABORATION:   The patients plan of care was discussed with: Registered nurse, Physician, and Case managementFernie Cheung Self   Time Calculation: 25 mins

## 2021-04-23 VITALS
HEART RATE: 74 BPM | HEIGHT: 68 IN | OXYGEN SATURATION: 93 % | RESPIRATION RATE: 16 BRPM | SYSTOLIC BLOOD PRESSURE: 128 MMHG | TEMPERATURE: 98.4 F | WEIGHT: 195.33 LBS | BODY MASS INDEX: 29.6 KG/M2 | DIASTOLIC BLOOD PRESSURE: 57 MMHG

## 2021-04-23 LAB
B PERT DNA SPEC QL NAA+PROBE: NOT DETECTED
BORDETELLA PARAPERTUSSIS PCR, BORPAR: NOT DETECTED
C PNEUM DNA SPEC QL NAA+PROBE: NOT DETECTED
FLUAV H1 2009 PAND RNA SPEC QL NAA+PROBE: NOT DETECTED
FLUAV H1 RNA SPEC QL NAA+PROBE: NOT DETECTED
FLUAV H3 RNA SPEC QL NAA+PROBE: NOT DETECTED
FLUAV SUBTYP SPEC NAA+PROBE: NOT DETECTED
FLUBV RNA SPEC QL NAA+PROBE: NOT DETECTED
HADV DNA SPEC QL NAA+PROBE: NOT DETECTED
HCOV 229E RNA SPEC QL NAA+PROBE: NOT DETECTED
HCOV HKU1 RNA SPEC QL NAA+PROBE: NOT DETECTED
HCOV NL63 RNA SPEC QL NAA+PROBE: NOT DETECTED
HCOV OC43 RNA SPEC QL NAA+PROBE: NOT DETECTED
HMPV RNA SPEC QL NAA+PROBE: NOT DETECTED
HPIV1 RNA SPEC QL NAA+PROBE: NOT DETECTED
HPIV2 RNA SPEC QL NAA+PROBE: NOT DETECTED
HPIV3 RNA SPEC QL NAA+PROBE: NOT DETECTED
HPIV4 RNA SPEC QL NAA+PROBE: NOT DETECTED
INR PPP: 1.3 (ref 0.9–1.1)
M PNEUMO DNA SPEC QL NAA+PROBE: NOT DETECTED
PROTHROMBIN TIME: 13.3 SEC (ref 9–11.1)
RSV RNA SPEC QL NAA+PROBE: NOT DETECTED
RV+EV RNA SPEC QL NAA+PROBE: NOT DETECTED
SARS-COV-2 PCR, COVPCR: NOT DETECTED

## 2021-04-23 PROCEDURE — 51798 US URINE CAPACITY MEASURE: CPT

## 2021-04-23 PROCEDURE — 97110 THERAPEUTIC EXERCISES: CPT

## 2021-04-23 PROCEDURE — 36415 COLL VENOUS BLD VENIPUNCTURE: CPT

## 2021-04-23 PROCEDURE — 85610 PROTHROMBIN TIME: CPT

## 2021-04-23 PROCEDURE — 97165 OT EVAL LOW COMPLEX 30 MIN: CPT

## 2021-04-23 PROCEDURE — 97116 GAIT TRAINING THERAPY: CPT

## 2021-04-23 PROCEDURE — 74011250636 HC RX REV CODE- 250/636: Performed by: PHYSICIAN ASSISTANT

## 2021-04-23 PROCEDURE — 0202U NFCT DS 22 TRGT SARS-COV-2: CPT

## 2021-04-23 PROCEDURE — 74011250637 HC RX REV CODE- 250/637: Performed by: PHYSICIAN ASSISTANT

## 2021-04-23 PROCEDURE — 97535 SELF CARE MNGMENT TRAINING: CPT

## 2021-04-23 PROCEDURE — 65270000029 HC RM PRIVATE

## 2021-04-23 RX ORDER — CEPHALEXIN 250 MG/1
250 CAPSULE ORAL 3 TIMES DAILY
Status: DISCONTINUED | OUTPATIENT
Start: 2021-04-23 | End: 2021-04-23 | Stop reason: HOSPADM

## 2021-04-23 RX ORDER — WARFARIN 2.5 MG/1
TABLET ORAL
Qty: 50 TAB | Refills: 0 | Status: SHIPPED | OUTPATIENT
Start: 2021-04-23 | End: 2022-09-16

## 2021-04-23 RX ORDER — TAMSULOSIN HYDROCHLORIDE 0.4 MG/1
0.4 CAPSULE ORAL DAILY
Qty: 30 CAP | Refills: 0 | Status: SHIPPED
Start: 2021-04-24 | End: 2022-09-16

## 2021-04-23 RX ORDER — CEPHALEXIN 250 MG/1
250 CAPSULE ORAL 3 TIMES DAILY
Qty: 30 CAP | Refills: 0 | Status: SHIPPED
Start: 2021-04-23 | End: 2022-09-16

## 2021-04-23 RX ORDER — OXYCODONE HYDROCHLORIDE 5 MG/1
2.5 TABLET ORAL
Qty: 20 TAB | Refills: 0 | Status: SHIPPED | OUTPATIENT
Start: 2021-04-23 | End: 2021-05-03

## 2021-04-23 RX ORDER — ENOXAPARIN SODIUM 100 MG/ML
40 INJECTION SUBCUTANEOUS DAILY
Qty: 1.2 ML | Refills: 0 | Status: SHIPPED
Start: 2021-04-23 | End: 2021-04-26

## 2021-04-23 RX ADMIN — TAMSULOSIN HYDROCHLORIDE 0.4 MG: 0.4 CAPSULE ORAL at 08:46

## 2021-04-23 RX ADMIN — AMIODARONE HYDROCHLORIDE 100 MG: 200 TABLET ORAL at 08:46

## 2021-04-23 RX ADMIN — OXYCODONE 2.5 MG: 5 TABLET ORAL at 08:59

## 2021-04-23 RX ADMIN — POLYETHYLENE GLYCOL 3350 17 G: 17 POWDER, FOR SOLUTION ORAL at 08:47

## 2021-04-23 RX ADMIN — OXYCODONE 2.5 MG: 5 TABLET ORAL at 13:22

## 2021-04-23 RX ADMIN — CEPHALEXIN 250 MG: 250 CAPSULE ORAL at 11:00

## 2021-04-23 RX ADMIN — ACETAMINOPHEN 500 MG: 500 TABLET ORAL at 10:54

## 2021-04-23 RX ADMIN — ACETAMINOPHEN 500 MG: 500 TABLET ORAL at 14:38

## 2021-04-23 RX ADMIN — OXYCODONE HYDROCHLORIDE 5 MG: 5 TABLET ORAL at 01:50

## 2021-04-23 RX ADMIN — ACETAMINOPHEN 500 MG: 500 TABLET ORAL at 06:25

## 2021-04-23 RX ADMIN — WARFARIN SODIUM 7 MG: 5 TABLET ORAL at 13:22

## 2021-04-23 RX ADMIN — ENOXAPARIN SODIUM 40 MG: 40 INJECTION SUBCUTANEOUS at 09:14

## 2021-04-23 RX ADMIN — DOCUSATE SODIUM 50 MG AND SENNOSIDES 8.6 MG 1 TABLET: 8.6; 5 TABLET, FILM COATED ORAL at 08:46

## 2021-04-23 RX ADMIN — ATORVASTATIN CALCIUM 20 MG: 20 TABLET, FILM COATED ORAL at 08:46

## 2021-04-23 NOTE — PROGRESS NOTES
CHANDA: Based on patient's progress. Patient needs IPR vs SNF. Discussed with Ortho NP who is in agreement with plan. RUR: N/A     Discussed with the patient and he agrees with going to a IPR vs. SNF. Gave patient choice and discussed with his daughter Maggie Gomez [phone] and she agrees with plan. Referrals sent to [facility], [facility], [facility]. CM following for discharge plan.      Bing Arreola RN/CRM

## 2021-04-23 NOTE — PROGRESS NOTES
PHYSICAL THERAPY    Patient is discharging to [facility] at 4:30pm.  Farzaneh Barnard. Daniel/PT

## 2021-04-23 NOTE — PROGRESS NOTES
4/22/21 2015  Paged Urologist about 16fr ag being clogged with clots. Dr. William Hummel wants a 20 fr coude placed. 46  Primary RN and CCL placed ag. Ag filled with clots and would not irrigate or drain. Called RN Supervisor, who has ICU experience. She attempted to place a 20 fr coude. This fley passed numerous clots but would not irrigate and eventually clotted off completely. Pt bladder scan showed 1024ml in bladder. Saurav Jackson 123 called Urology MD about ag placement issues. MD is coming in to place ag. 0020  After a few attempts, urologist was able to successfully place ag using a guide wire and an cystoscope. MD said ag is to remain in pt.  Pt to have a follow up out pt appointment with urologist.

## 2021-04-23 NOTE — PROGRESS NOTES
Pharmacist Note - Warfarin Dosing  Consult provided for this 80 y.o. male to manage warfarin for h/o DVT/afib, now s/p orthopedic surgery  INR Goal: 2-3    Home regimen:  5mg MWFSS + 2.5mg TR    Drugs that may increase INR: Amiodarone  Drugs that may decrease INR: None  Other current anticoagulants/ drugs that may increase bleeding risk: Lovenox  Risk factors: Age > 65  Daily INR ordered: YES    Recent Labs     04/23/21  0629 04/22/21  0321 04/21/21  0346   HGB  --   --  8.5*   INR 1.3* 1.3* 1.3*       Date               INR                 Dose  4/20  1.3  2.5 mg  4/21                 1.3                  5 mg  4/22  1.3  5 mg  4/23  1.3  7 mg    Assessment/ Plan: Will order warfarin 7 mg PO x 1 dose today. Ordering a higher dose today to help boost INR to therapeutic range as INR 1.3 the past few days with no trend towards therapeutic INR range despite dosing similar to home regimen. Pharmacy will continue to monitor daily and adjust therapy as indicated.

## 2021-04-23 NOTE — PROGRESS NOTES
I was called due to ag catheter not draining. Bladder scan suggested about 1000cc in bladder. Indwelling catheter not irrigating. I tried to pass 18 and 16f coude without success. Bedside flex cysto performed. Prostate absent. Moderate bladder neck contracture. False passage posteriorly. Cystoscope passed in to bladder and then 16 [place] placed over wire. >1000cc urine drained. Ag needs to stay in at least 7 days.   We will arrange outpatient voiding trial.

## 2021-04-23 NOTE — PROGRESS NOTES
Patient: Nathan Go MRN: [id number]  SSN: [id number]    YOB: 1928  Age: 80 y.o. Sex: male        ADMITTED: 2021 to [facility] Hooks MD by Gregory Hopper MD for Instability of internal right knee prosthesis, subsequent encounter [T84.022D]  Polyethylene wear of right knee prosthesis, subsequent encounter [T84.062D]  S/P TKR (total knee replacement), right [Z96.651]  History of total knee replacement, right [Z96.651]  Polyethylene wear of right knee joint prosthesis, subsequent encounter [T84.062D]  POD# 3 Days Post-Op Procedure(s):  REVISION RIGHT TOTAL KNEE ARTHROPLASTY (SPINAL W/BLOCK W/IV SEDATION)    FU for post-op retention and gross hematuria. Ag placed by Urology NP on , urine irrigated to clear red. Overnight, ag malfunctioned, bladder scan >1000 cc. Ag placed with cystoscopy by Dr. Michael Enriquez. He noted that the prostate was absent. Moderate bladder neck contracture. False passage posteriorly. On exam, clear yellow UA in drainage tubing. No bleeding at urinary meatus. He denies abd pain, dysuria, or further hematuria. AFVSS. Hgb 11.7 ->8.5.  +lovenox, warfarin. +Flomax     Vitals: Temp (24hrs), Av.5 °F (36.9 °C), Min:98 °F (36.7 °C), Max:99 °F (37.2 °C)    Blood pressure (P) 119/69, pulse (P) 84, temperature (P) 98.8 °F (37.1 °C), resp. rate (P) 14, height 5' 8\" (1.727 m), weight 88.6 kg (195 lb 5.2 oz), SpO2 (P) 93 %. Intake and Output:   1901 -  0700  In: -   Out: 600 [Urine:600]  No intake/output data recorded. CHINO Output lats 24 hrs: No data found. CHINO Output last 8 hrs: No data found. BM over last 24 hrs: No data found.     Physical Exam  General: NAD  Respiratory: no distress, breathing easily, room air  Abdomen: soft, no distention; non-tender to palpation  : no CVA tenderness, + ag with clear yellow UA  Neuro: Appropriate, no focal neurological deficits  Skin: warm, dry  Extremities: moves all     Labs:  CBC:   Lab Results   Component Value Date/Time    WBC 6.2 04/12/2021 11:27 AM    HCT 36.9 04/12/2021 11:27 AM    PLATELET 114 84/74/5033 11:27 AM     BMP:   Lab Results   Component Value Date/Time    Glucose 120 (H) 04/21/2021 03:46 AM    Sodium 139 04/21/2021 03:46 AM    Potassium 4.3 04/21/2021 03:46 AM    Chloride 107 04/21/2021 03:46 AM    CO2 26 04/21/2021 03:46 AM    BUN 21 (H) 04/21/2021 03:46 AM    Creatinine 1.45 (H) 04/21/2021 03:46 AM    Calcium 8.1 (L) 04/21/2021 03:46 AM       Assessment/Plan:     POD#3 REVISION RIGHT TOTAL KNEE ARTHROPLASTY  Post-op urinary retention  Gross hematuria     - bleeding likely 2/2 to urethral trauma in presence of coumadin use, now resolved. - monitor H&H, transfuse per protocol  - continue Flomax at discharge. - Maintain Ag at UT. Provide home ag care and teaching.   - Outpatient f/u scheduled 5/4/21 at 9 AM with Dr Paulo Parker and voiding trial at the [facility] location     Will sign off. Call with questions.    Supervising MD, Dr. Jacobo Patel By: Palomo Leo NP - April 23, 2021

## 2021-04-23 NOTE — PROGRESS NOTES
Bedside and Verbal shift change report given to Mark and Darell Sapp RN (oncoming nurse) by [facility] (offgoing nurse). Report included the following information SBAR, Kardex, ED Summary, OR Summary, Procedure Summary, Intake/Output, MAR and Recent Results.

## 2021-04-23 NOTE — PROGRESS NOTES
TRANSFER - OUT REPORT:    Verbal report given to Sonny(name) on Sintia Coker  being transferred to [facility](unit) for routine progression of care       Report consisted of patients Situation, Background, Assessment and   Recommendations(SBAR). Information from the following report(s) SBAR, Kardex, Intake/Output and MAR was reviewed with the receiving nurse. Lines:   Peripheral IV 04/20/21 Left Wrist (Active)   Site Assessment Clean, dry, & intact 04/22/21 2134   Phlebitis Assessment 0 04/22/21 2134   Infiltration Assessment 0 04/22/21 2134   Dressing Status Clean, dry, & intact 04/22/21 2134   Dressing Type Transparent;Tape 04/22/21 2134   Hub Color/Line Status Infusing 04/22/21 2134   Action Taken Open ports on tubing capped 04/22/21 2134   Alcohol Cap Used Yes 04/22/21 2134        Opportunity for questions and clarification was provided.       Patient transported with:   Transport

## 2021-04-23 NOTE — PROGRESS NOTES
Problem: Self Care Deficits Care Plan (Adult)  Goal: *Acute Goals and Plan of Care (Insert Text)  Description: Occupational Therapy Goals  Initiated: 4/23/2021   1. Patient will perform grooming with supervision/set-up standing at sink within 3 day(s). 2.  Patient will perform bathing with supervision/set-up from chair within 3 day(s). 3.  Patient will perform upper body dressing and lower body dressing with supervision/set-up within 3 day(s). 4.  Patient will perform toilet transfers with supervision/set-up within 3 day(s). 5.  Patient will perform all aspects of toileting with supervision/set-up within 3 day(s). FUNCTIONAL STATUS PRIOR TO ADMISSION: Pt lives home with his wife independently at home. He reports his wife is currently admitted to [facility] on the 4th floor. She will be unable to assist him at discharge. HOME SUPPORT: Lives with his wife who is currently in-patient at [facility]. Outcome: Progressing Towards Goal     OCCUPATIONAL THERAPY EVALUATION  Patient: Art Fang (72 y.o. male)  Date: 4/23/2021  Primary Diagnosis: Instability of internal right knee prosthesis, subsequent encounter [T84.022D]  Polyethylene wear of right knee prosthesis, subsequent encounter [T84.062D]  S/P TKR (total knee replacement), right [Z96.651]  History of total knee replacement, right [Z96.651]  Polyethylene wear of right knee joint prosthesis, subsequent encounter [T84.062D]  [facility] right [facility] joint [facility] [T84.062A]  Procedure(s) (LRB):  REVISION RIGHT TOTAL KNEE ARTHROPLASTY (SPINAL W/BLOCK W/IV SEDATION) (Right) 3 Days Post-Op   Precautions:   Fall, WBAT    ASSESSMENT  Based on the objective data described below, the patient presents with decreased independence with self care and functional mobility following admission for R TKRevision.   He was admitted for surgery on 4/20/21 and while he was having surgery, his wife experience a medical crisis and was admitted to the 4th floor of our hospital.  Pt is currently mod A for LB dressing activities and min A to CG for functional transfers. He is unable to meet his basic self care needs at this time. He will require rehab at discharge to maximize his independence while both and his wife are recovering. Current Level of Function Impacting Discharge (ADLs/self-care): mod A for LB ADL activities    Functional Outcome Measure: The patient scored 55 on the Barthel Index outcome measure which is indicative of 45% impairment with ADL. Other factors to consider for discharge: debility, pain, wife unable to assist him     Patient will benefit from skilled therapy intervention to address the above noted impairments. PLAN :  Recommendations and Planned Interventions: self care training, functional mobility training, therapeutic exercise, balance training, visual/perceptual training, therapeutic activities, endurance activities, patient education, home safety training, and family training/education    Frequency/Duration: Patient will be followed by occupational therapy 5 times a week to address goals. Recommendation for discharge: (in order for the patient to meet his/her long term goals)  Therapy 3 hours per day 5-7 days per week    This discharge recommendation:  Has been made in collaboration with the attending provider and/or case management    IF patient discharges home will need the following DME: none       SUBJECTIVE:   Patient stated I am feeling alright.     OBJECTIVE DATA SUMMARY:   HISTORY:   Past Medical History:   Diagnosis Date    Arthritis     DJD    Cancer ([facility]     prostate    Chronic pain     History of colonoscopy with polypectomy 2005~    Hypercholesterolemia     Other ill-defined conditions(799.89)     cataracts    Thromboembolus (HCC)     3 x left leg, 1 x right leg     Past Surgical History:   Procedure Laterality Date    HX CATARACT REMOVAL Bilateral     HX KNEE REPLACEMENT Left     HX MOHS PROCEDURES  1995    right    HX OTHER SURGICAL  1996    Tony filter insertion    HX PROSTATECTOMY  age 72    HX ROTATOR CUFF REPAIR Right     HX TONSILLECTOMY      AZ TOTAL HIP ARTHROPLASTY      left    AZ TOTAL KNEE ARTHROPLASTY      right       Expanded or extensive additional review of patient history:     Home Situation  Home Environment: Private residence  # Steps to Enter: 3  Rails to Enter: Yes  Hand Rails : Bilateral  Wheelchair Ramp: No  One/Two Story Residence: One story  Living Alone: No  Support Systems: Spouse/Significant Other/Partner  Patient Expects to be Discharged to[de-identified] Private residence  Current DME Used/Available at Home: Cane, straight  Tub or Shower Type: Shower    Hand dominance: Right    EXAMINATION OF PERFORMANCE DEFICITS:  Cognitive/Behavioral Status:  Neurologic State: Alert  Orientation Level: Oriented X4  Cognition: Appropriate for age attention/concentration  Perception: Appears intact  Perseveration: No perseveration noted  Safety/Judgement: Good awareness of safety precautions    Skin: dressing intact    Edema: none noted    Hearing: Auditory  Auditory Impairment: Hard of hearing, bilateral  Hearing Aids/Status: With patient    Vision/Perceptual:                           Acuity: Within Defined Limits         Range of Motion:    AROM: Within functional limits(RUE impaired due to RC injury; LUE WFL)  PROM: Within functional limits(RUE impaired due to RC injury; LUE WFL)                      Strength:    Strength: Within functional limits(RUE impaired due to RC injury; LUE WFL)                Coordination:  Coordination: Within functional limits(RUE impaired due to RC injury; LUE WFL)  Fine Motor Skills-Upper: Right Intact; Left Intact    Gross Motor Skills-Upper: Right Intact; Left Intact    Tone & Sensation:    Tone: Normal  Sensation: Intact                      Balance:  Sitting: Intact  Standing: Impaired; With support  Standing - Static: Fair  Standing - Dynamic : Fair;Constant support    Functional Mobility and Transfers for ADLs:  Bed Mobility:  Supine to Sit: (recieved in chair)  Sit to Supine: (remained in chair)    Transfers:  Sit to Stand: Contact guard assistance  Stand to Sit: Contact guard assistance  Bed to Chair: Contact guard assistance  Bathroom Mobility: Contact guard assistance  Toilet Transfer : Contact guard assistance    ADL Assessment:  Feeding: Supervision    Oral Facial Hygiene/Grooming: Supervision    Bathing: Moderate assistance    Upper Body Dressing: Supervision    Lower Body Dressing: Moderate assistance    Toileting: Minimum assistance                ADL Intervention and task modifications:     Pt received sitting in chair. He worked with LB dressing while sitting in the chair and able to reach only to NIRMALA ankles. He is not able to consistently walk to the bathroom due to ag for voiding bladder and pain in his right knee. Pt will keep ag pending urologist recommendations. Cognitive Retraining  Safety/Judgement: Good awareness of safety precautions      Functional Measure:  Barthel Index:    Bathin  Bladder: 10  Bowels: 10  Groomin  Dressin  Feeding: 10  Mobility: 0  Stairs: 0  Toilet Use: 5  Transfer (Bed to Chair and Back): 10  Total: 55/100        The Barthel ADL Index: Guidelines  1. The index should be used as a record of what a patient does, not as a record of what a patient could do. 2. The main aim is to establish degree of independence from any help, physical or verbal, however minor and for whatever reason. 3. The need for supervision renders the patient not independent. 4. A patient's performance should be established using the best available evidence. Asking the patient, friends/relatives and nurses are the usual sources, but direct observation and common sense are also important. However direct testing is not needed.   5. Usually the patient's performance over the preceding 24-48 hours is important, but occasionally longer periods will be relevant. 6. Middle categories imply that the patient supplies over 50 per cent of the effort. 7. Use of aids to be independent is allowed. Jaquelyn Der., Barthel, D.W. (4123). Functional evaluation: the Barthel Index. [facility] (14)2. Angel Luis Fair apoorva [place], J.J.M.F, Staci Lora., Carolin Wilson., [place], [facility] (). Measuring the change indisability after inpatient rehabilitation; comparison of the responsiveness of the Barthel Index and Functional [place] Measure. Journal of Neurology, Neurosurgery, and Psychiatry, 66(4), 274-182. Vanessa Rizvi, N.J.A, ANN-MARIE Ramírez, & Melissa Corrigan MFEDERICA. (2004.) Assessment of post-stroke quality of life in cost-effectiveness studies: The usefulness of the Barthel Index and the EuroQoL-5D. Quality of Life Research, 15, 297-97     Occupational Therapy Evaluation Charge Determination   History Examination Decision-Making   LOW Complexity : Brief history review  HIGH Complexity : 5 or more performance deficits relating to physical, cognitive , or psychosocial skils that result in activity limitations and / or participation restrictions HIGH Complexity : Patient presents with comorbidities that affect occupational performance. Signifigant modification of tasks or assistance (eg, physical or verbal) with assessment (s) is necessary to enable patient to complete evaluation       Based on the above components, the patient evaluation is determined to be of the following complexity level: LOW   Pain Ratin/10 pain in his right knee    Activity Tolerance:   Good    After treatment patient left in no apparent distress:    Sitting in chair and Call bell within reach    COMMUNICATION/EDUCATION:   The patients plan of care was discussed with: Physical therapist and Registered nurse. Home safety education was provided and the patient/caregiver indicated understanding.  and Patient/family have participated as able in goal setting and plan of care.    This patients plan of care is appropriate for delegation to JOSE ANGEL.     Thank you for this referral.  Mary Jo Linn, OT  Time Calculation: 17 mins

## 2021-04-23 NOTE — PROGRESS NOTES
Bedside and Verbal shift change report given to Jose Roberts RN (oncoming nurse) by Leola Cole RN (offgoing nurse). Report included the following information Kardex.

## 2021-04-23 NOTE — PROGRESS NOTES
Ortho Daily Progress Note    4/23/2021  8:40 AM    POD:  3 Days Post-Op  S/P:  Procedure(s):  REVISION RIGHT TOTAL KNEE ARTHROPLASTY (SPINAL W/BLOCK W/IV SEDATION)    Afebrile/VSS, NAD, A&O x 3  He is celebrating his 89rd birthday today! Doing well without complaints of nausea  Pain well controlled  Calves soft/NTTP Bilaterally  Incision OK, no drainage or dehiscence. Leg soft. Moderate swelling in knee  Dressing clean and dry  Moving lower extremities well. Neurocirculatory exam intact and within normal range. Had coude catheter placed last night by Dr. Randolph Oppenheim after bedside cystoscopy revealed a false posterior passage where previous catheter had been placed and therefore not draining the bladder. Passing clear urine now  Lab Results   Component Value Date/Time    HGB 8.5 (L) 04/21/2021 03:46 AM    INR 1.3 (H) 04/23/2021 06:29 AM     Recent Labs     04/21/21  0346 04/12/21  1127   CREA 1.45* 1.34*   BUN 21* 28*     Estimated Creatinine Clearance: 34.4 mL/min (A) (by C-G formula based on SCr of 1.45 mg/dL (H)). PLAN:Mr. Nadine Galicia will discharge with his catheter in place. He will follow-up with Dr. Randolph Oppenheim for voiding trial and probable removal  He will continue tamsulosin daily  Keflex 250 mg tid for 10 days prescribed for UTI prophylaxis given his multiple and difficult catherizations   DVT prophylaxis: Lovenox - coumadin bridge.  INR 1.3  WBAT with PT-mobilization  Pain Control: tylenol, oxycodone  Plan to D/C home vs SNF pending functional progress with PT      Pattricia Halsted, PA

## 2021-04-23 NOTE — PROGRESS NOTES
Problem: Mobility Impaired (Adult and Pediatric)  Goal: *Acute Goals and Plan of Care (Insert Text)  Description: FUNCTIONAL STATUS PRIOR TO ADMISSION: (per patient): Patient was modified independent using a single point cane for functional mobility. HOME SUPPORT PRIOR TO ADMISSION: The patient lived with wife but did not require assist. Daughter available to assist as needed. Physical Therapy Goals  Initiated 4/20/2021    1. Patient will move from supine to sit and sit to supine , scoot up and down, and roll side to side in bed with modified independence within 4 days. 2. Patient will perform sit to stand with modified independence within 4 days. 3. Patient will ambulate with modified independence for 75 feet with the least restrictive device within 4 days. 4. Patient will ascend/descend 4 stairs with both handrail(s) with contact guard assist within 4 days. 5. Patient will perform home exercise program per protocol with independence within 4 days. 6. Patient will demonstrate AROM 0-90 degrees in operative joint within 4 days. Outcome: Progressing Towards Goal   PHYSICAL THERAPY TREATMENT  Patient: Laurier Crigler (9 y.o. male)  Date: 4/23/2021  Diagnosis: Instability of internal right knee prosthesis, subsequent encounter [T84.022D]  Polyethylene wear of right knee prosthesis, subsequent encounter [T84.062D]  S/P TKR (total knee replacement), right [Z96.651]  History of total knee replacement, right [Z96.651]  Polyethylene wear of right knee joint prosthesis, subsequent encounter [T84.062D]  Polyethylene wear of right knee joint prosthesis[facility] [T84.062A] <principal problem not specified>  Procedure(s) (LRB):  REVISION RIGHT TOTAL KNEE ARTHROPLASTY (SPINAL W/BLOCK W/IV SEDATION) (Right) 3 Days Post-Op  Precautions: Fall, WBAT(Hard of Hearing)  Chart, physical therapy assessment, plan of care and goals were reviewed.     ASSESSMENT  Patient continues with skilled PT services and is progressing towards goals. Patient presents up in chair with RLE extended, ice packs applied. Pain meds given 30 minutes ago! Patient improved with mobility. Able to perform sit-stand and stand-sit with contact guard assistance. Able to ambulate short distance in hallway with RW and gait belt, minimal assistance. Tends to push RW too far out and rush himself and requires physical and verbal cues for safety. Tolerated 40 ft before requesting to turn around. Cues for safe sit-stand (not walking away from RW, not \"plopping\" into chair). In sitting, placed plastic bag on patient's right foot and had him perform active assistive extension with assisted eccentric contractions. Patient has improved since yesterday and now ambulating short distance, but is still not mobile enough to be safe at home. Patient will benefit from IPR in order to achieve maximal functional recovery, return to independent PLOF and return to his residence. Current Level of Function Impacting Discharge (mobility/balance): Contact Guard Assistance for sit-stand-sit, minimal assistance and constant cues for technique and safety for gait. Other factors to consider for discharge: Has not met acute care goals/unable to negotiate stairs to enter /exit the home/vacillating mobility         PLAN :  Patient continues to benefit from skilled intervention to address the above impairments. Continue treatment per established plan of care. to address goals. Recommendation for discharge: (in order for the patient to meet his/her long term goals)  Therapy 3 hours per day 5-7 days per week    This discharge recommendation:  Has been made in collaboration with the attending provider and/or case management    IF patient discharges home will need the following DME: patient owns DME required for discharge       SUBJECTIVE:   Patient stated I am still hurting a lot.     OBJECTIVE DATA SUMMARY:   Critical Behavior:  Neurologic State: Anesthetized  Orientation Level: Oriented X4  Cognition: Appropriate decision making, Appropriate for age attention/concentration, Appropriate safety awareness, Follows commands     Functional Mobility Training:  Bed Mobility:     Supine to Sit: (presents up in chair, knee extended, ice packs applied)              Transfers:  Sit to Stand: Contact guard assistance  Stand to Sit: Contact guard assistance                             Balance:  Sitting: Intact  Standing: Impaired; With support  Standing - Static: Fair;Constant support  Standing - Dynamic : Fair;Constant support  Ambulation/Gait Training:  Distance (ft): 40 Feet (ft)  Assistive Device: Walker, rolling;Gait belt  Ambulation - Level of Assistance: Contact guard assistance;Minimal assistance        Gait Abnormalities: Antalgic(cues to stay close to RW)  Right Side Weight Bearing: As tolerated     Base of Support: Widened;Shift to left  Stance: Right decreased(slightly flexed knee)  Speed/Angela: Slow;Shuffled  Step Length: Left shortened  Swing Pattern: Right asymmetrical     Interventions: Verbal cues; Safety awareness training             Therapeutic Exercises: In sitting, placed plastic bag on patient's right foot and had him perform active assistive extension with assisted eccentric contractions. Performed quad sets    Pain Ratin/10    Activity Tolerance:   Good    After treatment patient left in no apparent distress:   Sitting in chair, Call bell within reach, and nurse notified and chair alarm    COMMUNICATION/COLLABORATION:   The patients plan of care was discussed with: Occupational therapist, Registered nurse, Physician, and Case management.      José Miguel Flores   Time Calculation: 25 mins

## 2021-04-23 NOTE — DISCHARGE INSTRUCTIONS
Post-op Discharge Instructions Following Total Joint Replacement  Sushila Magallon MD  [facility]  [phone], ext [phone]  Follow-up Office Visit   See Dr. Giovanna Reina approximately 3-4 weeks from date of surgery. Call [phone], ext [phone] to make an appointment. Activity   Use your walker for ambulation. Weight bearing as tolerated unless instructed otherwise by the physical therapist. Get up every hour you are awake and take a brief walk. Lengthen walking distance daily as your strength improves.  Continue using your walker until seen in the office for your first follow up visit.  Practice your exercises 3 times daily as instructed by the physical therapist. Chasity Seat for 20 minutes after exercising.  No driving until seen in the office for your first follow up visit. Incision Care   The light brown Aquacel surgical dressing is waterproof and is to remain on your incision for 7 days. On the 7th day, carefully lift the edge of the dressing to break the adhesive seal and gently peel it off.  If your Aquacel dressings comes loose or falls off before the 7th day, replace it with a dry sterile gauze dressing and change this dressing daily. Once there is no drainage on the bandage, you mean leave the incision open to air.  You may take a shower with the Aquacel dressing in place. After you remove the Aquacel dressing on day 7, you may continue to shower and get your incision wet in the shower. Do not submerge your incision under water in a bathtub, hot tub, swimming pool, etc. until after you have been evaluated at your first office visit. Medications   Blood Clot Prevention: Take warfarin as prescribed by your physician for 4 weeks postop. Goal INR is 1.7-2.2   Pain Management: Take pain medication as prescribed; wean yourself off of pain medication as your pain lessens. Take with food.  You make also take Tylenol every 4-6 hours as needed for pain. Do not exceed 3 grams (3000mg) per day.    Place an ice wrap on or around the incision  as needed throughout the day and night, especially after exercising.  Stool Softener: You may want to take a stool softener (such as Senokot-S or Colace) to prevent constipation while taking pain medication. If constipation occurs, you may also use a laxative (such as Dulcolax tablets, Miralax, or a suppository). Diet   Resume usual diet at home. Drink plenty of fluids. Eat foods high in fiber and protein. Calcium and Vitamin D supplements recommended. Avoid alcoholic beverages. No smoking. When to call your Orthopaedic Surgeon: If you call after 5pm or on a weekend, the on call physician will return your call   Pain that is not relieved by pain medication, ice, and activity modification   Signs of infection (red incision, continuous drainage from the incision, malodorous drainage, persistent fever greater than 101 degrees Fahrenheit)   Signs of a blood clot in your leg (calf pain, tenderness, redness, and/or swelling of the lower leg)  ?   When to call your Primary Care Physician   Concerns about your medical conditions such as diabetes, high blood pressure, asthma, congestive heart failure   Call if blood sugars are elevated, if you have a persistent headache or dizziness, coughing or congestion, constipation or diarrhea, burning with urination, abnormal heart rate (fast or slow)  When to call 911 and go to the nearest Emergency Room   Acute onset of chest pain, shortness of breath, difficulty breathing

## 2021-04-23 NOTE — PROGRESS NOTES
Orthopaedics Daily Progress Note                            Date of Surgery:  4/20/2021      Patient: Nathan Go   YOB: 1928  Age: 80 y.o. SUBJECTIVE:   3 Days Post-Op following REVISION RIGHT TOTAL KNEE ARTHROPLASTY (SPINAL W/BLOCK W/IV SEDATION). The patient's post operative pain is controlled. No CP/SOB. No N/V. The patient's mobility will be evaluated today during PT sessions. Difficulty progressing with PT due to pain. Urinary issues noted; ag placement x7 days. Follow up [place] [facility] for voiding trial.     OBJECTIVE:     Vital Signs:      Visit Vitals  BP (!) 144/67 (BP 1 Location: Right upper arm, BP Patient Position: Sitting) Comment: RN and instructor notified   Pulse 74   Temp 98.4 °F (36.9 °C)   Resp 16   Ht 5' 8\" (1.727 m)   Wt 88.6 kg (195 lb 5.2 oz)   SpO2 96%   BMI 29.70 kg/m²       Physical Exam:  General: A&Ox3. The patient is cooperative, and in no acute distress. Respiratory: Respirations are unlabored. Surgical site(s): dressing clean, dry  Musculoskeletal: Calves are soft, supple, and non-tender upon palpation. Motor 5/5. Neurological:  Neurovascularly intact with good dorsi and plantar flexion. Pulses symmetrical.    Laboratory Values:             Recent Results (from the past 12 hour(s))   PROTHROMBIN TIME + INR    Collection Time: 04/23/21  6:29 AM   Result Value Ref Range    INR 1.3 (H) 0.9 - 1.1      Prothrombin time 13.3 (H) 9.0 - 11.1 sec         PLAN:     S/P REVISION RIGHT TOTAL KNEE ARTHROPLASTY (SPINAL W/BLOCK W/IV SEDATION) -Continue WBAT. -Mobilize and continue with PT/OT until discharged     Hemodynamics Acute blood loss anemia as expected. Patient asymptomatic. Continue to monitor. Wound Monitor postop dressing; no postop dressing changes necessary. Reinforce PRN. Post Operative Pain Pain Control: stable, mild-to-moderate joint symptoms intermittently, reasonably well controlled by current meds.      DVT Prophylaxis Continue with SCD'S, Ankle Pump Exercises. Lovenox to coumadin bridge     Discharge Disposition Discharge plan: IPR vs SNF per PT recommendations.        Signed By: Jacky Rangel PA-C  April 23, 2021 8:58 AM

## 2021-05-04 LAB
BACTERIA SPEC CULT: NORMAL
GRAM STN SPEC: NORMAL
SERVICE CMNT-IMP: NORMAL

## 2021-05-14 ENCOUNTER — TRANSCRIBE ORDER (OUTPATIENT)
Dept: SCHEDULING | Age: 86
End: 2021-05-14

## 2021-05-14 DIAGNOSIS — M48.061 DEGENERATIVE LUMBAR SPINAL STENOSIS: ICD-10-CM

## 2021-05-14 DIAGNOSIS — M51.36 DDD (DEGENERATIVE DISC DISEASE), LUMBAR: Primary | ICD-10-CM

## 2022-03-19 PROBLEM — T84.022A INSTABILITY OF INTERNAL RIGHT KNEE PROSTHESIS (HCC): Status: ACTIVE | Noted: 2021-04-20

## 2022-03-19 PROBLEM — Z96.651 HISTORY OF TOTAL KNEE REPLACEMENT, RIGHT: Status: ACTIVE | Noted: 2021-04-20

## 2022-03-20 PROBLEM — T84.062A POLYETHYLENE WEAR OF RIGHT KNEE JOINT PROSTHESIS (HCC): Status: ACTIVE | Noted: 2021-04-20

## 2022-07-06 ENCOUNTER — OFFICE VISIT (OUTPATIENT)
Dept: ORTHOPEDIC SURGERY | Age: 87
End: 2022-07-06
Payer: MEDICARE

## 2022-07-06 VITALS — BODY MASS INDEX: 28.04 KG/M2 | WEIGHT: 185 LBS | HEIGHT: 68 IN

## 2022-07-06 DIAGNOSIS — Z96.651 STATUS POST REVISION OF TOTAL REPLACEMENT OF RIGHT KNEE: Primary | ICD-10-CM

## 2022-07-06 DIAGNOSIS — Z96.651 PAIN DUE TO TOTAL RIGHT KNEE REPLACEMENT, INITIAL ENCOUNTER (HCC): ICD-10-CM

## 2022-07-06 DIAGNOSIS — T84.84XA PAIN DUE TO TOTAL RIGHT KNEE REPLACEMENT, INITIAL ENCOUNTER (HCC): ICD-10-CM

## 2022-07-06 PROCEDURE — G8432 DEP SCR NOT DOC, RNG: HCPCS | Performed by: ORTHOPAEDIC SURGERY

## 2022-07-06 PROCEDURE — 99213 OFFICE O/P EST LOW 20 MIN: CPT | Performed by: ORTHOPAEDIC SURGERY

## 2022-07-06 PROCEDURE — G8427 DOCREV CUR MEDS BY ELIG CLIN: HCPCS | Performed by: ORTHOPAEDIC SURGERY

## 2022-07-06 PROCEDURE — G8417 CALC BMI ABV UP PARAM F/U: HCPCS | Performed by: ORTHOPAEDIC SURGERY

## 2022-07-06 PROCEDURE — 1101F PT FALLS ASSESS-DOCD LE1/YR: CPT | Performed by: ORTHOPAEDIC SURGERY

## 2022-07-06 PROCEDURE — G8536 NO DOC ELDER MAL SCRN: HCPCS | Performed by: ORTHOPAEDIC SURGERY

## 2022-07-06 PROCEDURE — 1123F ACP DISCUSS/DSCN MKR DOCD: CPT | Performed by: ORTHOPAEDIC SURGERY

## 2022-07-06 NOTE — LETTER
7/6/2022    Patient: Donna Sterling   YOB: 1928   Date of Visit: 7/6/2022     Joce Kiser MD  [address] [place] [address] [phone]  Via Fax: [phone]    Dear Joce Kiser MD,      Thank you for referring Mr. Dagmar Stockton to [place] for evaluation. My notes for this consultation are attached. If you have questions, please do not hesitate to call me. I look forward to following your patient along with you.       Sincerely,    Radha Russell MD

## 2022-07-06 NOTE — PROGRESS NOTES
Justine Bowers (: 1928) is a 80 y.o. male, patient, here for evaluation of the following chief complaint(s):  Surgical Follow-up (rev RTK (2021), swelling)       SUBJECTIVE/OBJECTIVE:  Justine Bowers presents today complaining of swelling and mild pain adjacent to revision right total knee replacement. 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 Patella was not resurfaced at that time due to significant erosion of his native patella. In general he is functioning okay, but he has had chronic swelling in the knee. He has discomfort with prolonged standing in 1 position. Difficulty with stairs, 2 feet on 1 step using handrail. No aching at rest or at night. Denies subjective instability. He had a mechanical fall about 2 months ago where he skinned both knees. He states his symptoms were present before the fall. PHYSICAL EXAM:  Vitals: Ht 5' 8\" (1.727 m)   Wt 185 lb (83.9 kg)   BMI 28.13 kg/m²   Body mass index is 28.13 kg/m². 80y.o. year old M, no distress. Ambulates with unsteady gait using a cane, no limp. Pain-free motion right hip. Negative Stinchfield. Revision right total knee is in neutral alignment. Healed midline anterior scar. Fairly significant effusion present. No warmth. No tenderness. Full active knee extension with flexion to approximately 125 degrees. Knee is stable throughout arc of motion. Symmetrical palpable distal pulses. No gross motor or sensory deficits in lower extremities. No distal edema. IMAGING:  Radiographs: XR Results (most recent):  Results from Appointment encounter on 22    XR KNEE RT 3 V    Narrative  3 x-ray views of right knee including AP, lateral, sunrise demonstrate satisfactory position of alignment of revision posterior stabilized persona total knee components. No evidence of loosening. There is some lateral patellar tilt. Patella is not resurfaced.   There is some fragmentation of the lateral patella visible on axial image. ASSESSMENT/PLAN:  1. Status post revision of total replacement of right knee  -     XR KNEE RT 3 V; Future  2. Pain due to total right knee replacement, initial encounter (HCC)  -     C REACTIVE PROTEIN, QT; Future  -     SED RATE (ESR); Future    The xray and exam findings were discussed with the patient today. I suspect his effusion and symptoms are related to findings on the patella on plain x-rays today. There is some fragmentation of his patella laterally. He still has full active extension without pain when resisted here in the office. We will send him for screening sed rate and C-reactive protein. If normal, will continue observation. May perform therapeutic aspiration at some point in the future if needed, but he understands the swelling will likely return. As long as upcoming lab work is unremarkable, he will return as needed. No follow-ups on file. Review Of Systems  ROS     Positive for: Musculoskeletal    Last edited by Rebecca Chery RN on 7/6/2022 11:01 AM. (History)         Patient denies any recent fever, chills, nausea, vomiting, chest pain, or shortness of breath. No Known Allergies    Current Outpatient Medications   Medication Sig    tamsulosin (FLOMAX) 0.4 mg capsule Take 1 Cap by mouth daily. Indications: enlarged prostate with urination problem    warfarin (Coumadin) 2.5 mg tablet Take daily or as directed per twice weekly blood tests (INR)  Indications: deep vein thrombosis prevention    cephALEXin (KEFLEX) 250 mg capsule Take 1 Cap by mouth three (3) times daily. Indications: bacterial urinary tract infection    senna-docusate (PERICOLACE) 8.6-50 mg per tablet Take 1 Tab by mouth two (2) times daily as needed for Constipation.  acetaminophen (TYLENOL) 500 mg tablet Take 1 Tab by mouth every four (4) hours.  amiodarone (PACERONE) 100 mg tablet Take 100 mg by mouth daily.     warfarin (Coumadin) 5 mg tablet Take 5 mg by mouth daily. Indications: MON WED FRI SAT SUN    warfarin (Coumadin) 2.5 mg tablet Take 2.5 mg by mouth daily. Indications: TUES AND THURS    furosemide (LASIX) 40 mg tablet Take 40 mg by mouth daily.  furosemide (Lasix) 20 mg tablet Take  by mouth nightly.  celecoxib (CeleBREX) 200 mg capsule Take 200 mg by mouth daily.  atorvastatin (LIPITOR) 20 mg tablet Take 1 Tab by mouth daily. Indications: high cholesterol    vit C/E/Zn/coppr/lutein/zeaxan (PRESERVISION AREDS-2 PO) Take  by mouth daily.  latanoprost (XALATAN) 0.005 % ophthalmic solution Administer 1 Drop to both eyes nightly.  cholecalciferol (VITAMIN D3) (1000 Units /25 mcg) tablet Take 1,000 Units by mouth daily. No current facility-administered medications for this visit.        Past Medical History:   Diagnosis Date    Arthritis     DJD    Cancer ([facility]     prostate    Chronic pain     History of colonoscopy with polypectomy 2005~    Hypercholesterolemia     Other ill-defined conditions(799.89)     cataracts    Thromboembolus ([facility]     3 x left leg, 1 x right leg        Past Surgical History:   Procedure Laterality Date    HX CATARACT REMOVAL Bilateral     HX KNEE REPLACEMENT Left     HX MOHS PROCEDURES  1995    right    HX OTHER SURGICAL  1996    Tony filter insertion    HX PROSTATECTOMY  age 72   [de-identified] ROTATOR CUFF REPAIR Right     HX TONSILLECTOMY      [place] TOTAL HIP ARTHROPLASTY      left    [place] TOTAL KNEE ARTHROPLASTY      right       Family History   Problem Relation Age of Onset    Cancer Mother         STOMACH    Cancer Father         PROSTATE    Heart Disease Brother         MI    Anesth Problems Neg Hx         Social History     Socioeconomic History    Marital status:      Spouse name: Not on file    Number of children: Not on file    Years of education: Not on file    Highest education level: Not on file   Occupational History    Not on file Tobacco Use    Smoking status: Former Smoker    Smokeless tobacco: Never Used    Tobacco comment: quit 15 yrs ago   Vaping Use    Vaping Use: Never used   Substance and Sexual Activity    Alcohol use: Yes     Comment: seldom    Drug use: No    Sexual activity: Not Currently   Other Topics Concern    Not on file   Social History Narrative    Not on file     Social Determinants of Health     Financial Resource Strain:     Difficulty of Paying Living Expenses: Not on file   Food Insecurity:     Worried About Running Out of Food in the Last Year: Not on file    Kan of Food in the Last Year: Not on file   Transportation Needs:     Lack of Transportation (Medical): Not on file    Lack of Transportation (Non-Medical):  Not on file   Physical Activity:     Days of Exercise per Week: Not on file    Minutes of Exercise per Session: Not on file   Stress:     Feeling of Stress : Not on file   Social Connections:     Frequency of Communication with Friends and Family: Not on file    Frequency of Social Gatherings with Friends and Family: Not on file    Attends Worship Services: Not on file    Active Member of [address] [place] [address] or Organizations: Not on file    Attends Club or Organization Meetings: Not on file    Marital Status: Not on file   Intimate Partner Violence:     Fear of Current or Ex-Partner: Not on file    Emotionally Abused: Not on file    Physically Abused: Not on file    Sexually Abused: Not on file   Housing Stability:     Unable to Pay for Housing in the Last Year: Not on file    Number of [place] in the Last Year: Not on file    Unstable Housing in the Last Year: Not on file       Orders Placed This Encounter    XR KNEE RT 3 V     Standing Status:   Future     Number of Occurrences:   1     Standing Expiration Date:   7/7/2023    C REACTIVE PROTEIN, QT     Standing Status:   Future     Standing Expiration Date:   7/6/2023    SED RATE (ESR)     Standing Status:   Future Standing Expiration Date:   7/6/2023        An electronic signature was used to authenticate this note.   -- Natasha Pichardo MD

## 2022-08-11 ENCOUNTER — OFFICE VISIT (OUTPATIENT)
Dept: ORTHOPEDIC SURGERY | Age: 87
End: 2022-08-11
Payer: MEDICARE

## 2022-08-11 VITALS — BODY MASS INDEX: 28.04 KG/M2 | HEIGHT: 68 IN | WEIGHT: 185 LBS

## 2022-08-11 DIAGNOSIS — Z96.651 STATUS POST REVISION OF TOTAL REPLACEMENT OF RIGHT KNEE: Primary | ICD-10-CM

## 2022-08-11 DIAGNOSIS — Z96.651 PAIN DUE TO TOTAL RIGHT KNEE REPLACEMENT, SUBSEQUENT ENCOUNTER: ICD-10-CM

## 2022-08-11 DIAGNOSIS — T84.84XD PAIN DUE TO TOTAL RIGHT KNEE REPLACEMENT, SUBSEQUENT ENCOUNTER: ICD-10-CM

## 2022-08-11 PROCEDURE — 99213 OFFICE O/P EST LOW 20 MIN: CPT | Performed by: ORTHOPAEDIC SURGERY

## 2022-08-11 PROCEDURE — 1123F ACP DISCUSS/DSCN MKR DOCD: CPT | Performed by: ORTHOPAEDIC SURGERY

## 2022-08-11 PROCEDURE — 20610 DRAIN/INJ JOINT/BURSA W/O US: CPT | Performed by: ORTHOPAEDIC SURGERY

## 2022-08-11 PROCEDURE — G8417 CALC BMI ABV UP PARAM F/U: HCPCS | Performed by: ORTHOPAEDIC SURGERY

## 2022-08-11 PROCEDURE — 1101F PT FALLS ASSESS-DOCD LE1/YR: CPT | Performed by: ORTHOPAEDIC SURGERY

## 2022-08-11 PROCEDURE — G8536 NO DOC ELDER MAL SCRN: HCPCS | Performed by: ORTHOPAEDIC SURGERY

## 2022-08-11 PROCEDURE — G8427 DOCREV CUR MEDS BY ELIG CLIN: HCPCS | Performed by: ORTHOPAEDIC SURGERY

## 2022-08-11 PROCEDURE — G8432 DEP SCR NOT DOC, RNG: HCPCS | Performed by: ORTHOPAEDIC SURGERY

## 2022-08-11 NOTE — PROGRESS NOTES
Fallon Washington (: 1928) is a 80 y.o. male, patient, here for evaluation of the following chief complaint(s):  Surgical Follow-up (RTK: 22)       SUBJECTIVE/OBJECTIVE:  Fallon Washington presents today for follow-up of her revision right total knee. He was here about 6 weeks ago. He said persistent swelling and tightness in the right knee ever since surgery. Exacerbated by a fall earlier this year. Symptoms were present before the fall. Denies subjective instability. He denies pain. Does not wake him at night. PHYSICAL EXAM:  Vitals: Ht 5' 8\" (1.727 m)   Wt 185 lb (83.9 kg)   BMI 28.13 kg/m²   Body mass index is 28.13 kg/m². 80y.o. year old M, no distress. Ambulates with a slightly broad-based gait. No limp. Pain-free motion right hip. Right knee neutral alignment. Healed midline anterior scar. Moderate knee effusion with synovitis, no warmth. No tenderness. Has full active knee extension with flexion to approximately 120 530 degrees. Knee is stable throughout arc of motion. Symmetrical palpable distal pulses. No gross motor or sensory deficits in lower extremities. No distal edema. I sent him for lab work after his last visit. Sed rate 50, C-reactive protein elevated at 3.39 mg/dL. IMAGING:  Radiographs: Most recent right knee x-rays from  demonstrate satisfactory position and alignment of persona revision right total knee components. No evidence of loosening. There are some fragmentation of the lateral patella. ASSESSMENT/PLAN:  1. Status post revision of total replacement of right knee  -     CELL COUNT, SYNOVIAL; Future  -     CULTURE, ANAEROBIC; Future  -     CULTURE, BODY FLUID W GRAM STAIN; Future  2. Pain due to total right knee replacement, subsequent encounter  -     CELL COUNT, SYNOVIAL;  Future  -     CULTURE, ANAEROBIC; Future  -     CULTURE, BODY FLUID W GRAM STAIN; Future    Persistent knee effusion and tightness over a year out from revision right total knee replacement. Recent labs were mildly elevated. Proceeded today with therapeutic aspiration of the right knee, and also to send fluid off for analysis. Risks and benefits of aspiration discussed, verbal consent obtained. After sterile prep of right lateral knee, skin was anesthetized with 2 cc of 0.75% Marcaine. 18-gauge solid bore needle was utilized to aspirate the right knee joint under sterile conditions. Approximately 25 cc of bloody joint fluid was obtained. Fluid was collected and appropriate containers for comprehensive Snynovasure analysis, as well as aerobic and anaerobic culture, cell count with differential to be performed at [facility]. I will follow-up with him by phone after all results are final.       No follow-ups on file. Review Of Systems  ROS    Negative for: Constitutional, Gastrointestinal, Neurological, Skin, Genitourinary, Musculoskeletal, HENT, Endocrine, Cardiovascular, Eyes, Respiratory, Psychiatric, Allergic/Imm, Heme/Lymph  Last edited by Susana Carlisle on 8/11/2022  1:48 PM.         Patient denies any recent fever, chills, nausea, vomiting, chest pain, or shortness of breath. No Known Allergies    Current Outpatient Medications   Medication Sig    tamsulosin (FLOMAX) 0.4 mg capsule Take 1 Cap by mouth daily. Indications: enlarged prostate with urination problem    warfarin (Coumadin) 2.5 mg tablet Take daily or as directed per twice weekly blood tests (INR)  Indications: deep vein thrombosis prevention    cephALEXin (KEFLEX) 250 mg capsule Take 1 Cap by mouth three (3) times daily. Indications: bacterial urinary tract infection    senna-docusate (PERICOLACE) 8.6-50 mg per tablet Take 1 Tab by mouth two (2) times daily as needed for Constipation. acetaminophen (TYLENOL) 500 mg tablet Take 1 Tab by mouth every four (4) hours. amiodarone (PACERONE) 100 mg tablet Take 100 mg by mouth daily.     warfarin (COUMADIN) 5 mg tablet Take 5 mg by mouth daily. Indications: MON WED FRI SAT SUN    warfarin (COUMADIN) 2.5 mg tablet Take 2.5 mg by mouth daily. Indications: TUES AND THURS    furosemide (LASIX) 40 mg tablet Take 40 mg by mouth daily. furosemide (LASIX) 20 mg tablet Take  by mouth nightly. celecoxib (CELEBREX) 200 mg capsule Take 200 mg by mouth daily. atorvastatin (LIPITOR) 20 mg tablet Take 1 Tab by mouth daily. Indications: high cholesterol    vit C/E/Zn/coppr/lutein/zeaxan (PRESERVISION AREDS-2 PO) Take  by mouth daily. latanoprost (XALATAN) 0.005 % ophthalmic solution Administer 1 Drop to both eyes nightly. cholecalciferol (VITAMIN D3) (1000 Units /25 mcg) tablet Take 1,000 Units by mouth daily. No current facility-administered medications for this visit.        Past Medical History:   Diagnosis Date    Arthritis     DJD    Cancer ([facility] [address]     prostate    Chronic pain     History of colonoscopy with polypectomy 2005~    Hypercholesterolemia     Other ill-defined conditions(799.89)     cataracts    Thromboembolus (HCC)     3 x left leg, 1 x right leg        Past Surgical History:   Procedure Laterality Date    HX CATARACT REMOVAL Bilateral     HX KNEE REPLACEMENT Left     HX MOHS PROCEDURES  1995    right    HX OTHER SURGICAL  1996    Tony filter insertion    HX PROSTATECTOMY  age 72    HX ROTATOR CUFF REPAIR Right     HX TONSILLECTOMY      NE TOTAL HIP ARTHROPLASTY      left    NE TOTAL KNEE ARTHROPLASTY      right       Family History   Problem Relation Age of Onset    Cancer Mother         STOMACH    Cancer Father         PROSTATE    Heart Disease Brother         MI    Anesth Problems Neg Hx         Social History     Socioeconomic History    Marital status:      Spouse name: Not on file    Number of children: Not on file    Years of education: Not on file    Highest education level: Not on file   Occupational History    Not on file   Tobacco Use    Smoking status: Former    Smokeless tobacco: Never    Tobacco comments:     quit 15 yrs ago   Vaping Use    Vaping Use: Never used   Substance and Sexual Activity    Alcohol use: Yes     Comment: seldom    Drug use: No    Sexual activity: Not Currently   Other Topics Concern    Not on file   Social History Narrative    Not on file     Social Determinants of Health     Financial Resource Strain: Not on file   Food Insecurity: Not on file   Transportation Needs: Not on file   Physical Activity: Not on file   Stress: Not on file   Social Connections: Not on file   Intimate Partner Violence: Not on file   Housing Stability: Not on file       Orders Placed This Encounter    CULTURE, ANAEROBIC     Standing Status:   Future     Standing Expiration Date:   8/11/2023    CULTURE, BODY FLUID W GRAM STAIN     Standing Status:   Future     Standing Expiration Date:   8/11/2023     Order Specific Question:   Tube Number:     Answer:   1    CELL COUNT, SYNOVIAL     Standing Status:   Future     Standing Expiration Date:   8/11/2023        An electronic signature was used to authenticate this note.   -- Yoavni Reyna MD

## 2022-08-11 NOTE — LETTER
8/12/2022    Patient: Donna Sterling   YOB: 1928   Date of Visit: 8/11/2022     Joce Kiser MD  [address] [place] [phone]  Via Fax: [phone]    Dear Joce Kiser MD,      Thank you for referring Mr. Dagmar Stockton to [facility] for evaluation. My notes for this consultation are attached. If you have questions, please do not hesitate to call me. I look forward to following your patient along with you.       Sincerely,    Radha Russell MD

## 2022-08-12 LAB
APPEARANCE SNV: ABNORMAL
COLOR SNV: ABNORMAL
LYMPHOCYTES NFR SNV MANUAL: 1 % (ref 0–15)
MONOCYTES NFR SNV MANUAL: 7 % (ref 0–65)
NEUTROPHILS NFR SNV MANUAL: 92 % (ref 0–20)
RBC # SNV: >100 /CU MM
SPECIMEN SOURCE FLD: ABNORMAL
WBC # SNV: ABNORMAL /CU MM (ref 0–150)

## 2022-08-18 ENCOUNTER — TELEPHONE (OUTPATIENT)
Dept: ORTHOPEDIC SURGERY | Age: 87
End: 2022-08-18

## 2022-08-18 NOTE — TELEPHONE ENCOUNTER
Patient was in the office last week for aspiration of revision right total knee replacement. Approximately 25 cc of bloody joint fluid was obtained and sent off for [facility] analysis, as well as analysis at [address] [place] [address]. Analysis at [address] [place] [address] revealed 13,009 white cells, 92% neutrophils, up with anaerobic culture positive for Micrococcus Luteus (broth only). Aerobic culture with no growth to date, but is being incubated for 14 days. Synovasure analysis revealed 8500 white cells with 87% neutrophils, culture positive for strep salivarius. Alpha-defensin positive. Synovasure microbial ID panel positive for staph, negative for candida, p. acnes, and enterococcus. Discussed with patient's daughter by phone. He will make an appointment to see me in the office next week to discuss treatment options.

## 2022-08-22 ENCOUNTER — OFFICE VISIT (OUTPATIENT)
Dept: ORTHOPEDIC SURGERY | Age: 87
End: 2022-08-22
Payer: MEDICARE

## 2022-08-22 VITALS — HEIGHT: 68 IN | BODY MASS INDEX: 28.04 KG/M2 | WEIGHT: 185 LBS

## 2022-08-22 DIAGNOSIS — Z96.651 STATUS POST REVISION OF TOTAL REPLACEMENT OF RIGHT KNEE: ICD-10-CM

## 2022-08-22 DIAGNOSIS — Z96.651 PAIN DUE TO TOTAL RIGHT KNEE REPLACEMENT, SUBSEQUENT ENCOUNTER: Primary | ICD-10-CM

## 2022-08-22 DIAGNOSIS — T84.84XD PAIN DUE TO TOTAL RIGHT KNEE REPLACEMENT, SUBSEQUENT ENCOUNTER: Primary | ICD-10-CM

## 2022-08-22 PROCEDURE — 99213 OFFICE O/P EST LOW 20 MIN: CPT | Performed by: ORTHOPAEDIC SURGERY

## 2022-08-22 PROCEDURE — G8417 CALC BMI ABV UP PARAM F/U: HCPCS | Performed by: ORTHOPAEDIC SURGERY

## 2022-08-22 PROCEDURE — G8536 NO DOC ELDER MAL SCRN: HCPCS | Performed by: ORTHOPAEDIC SURGERY

## 2022-08-22 PROCEDURE — 1123F ACP DISCUSS/DSCN MKR DOCD: CPT | Performed by: ORTHOPAEDIC SURGERY

## 2022-08-22 PROCEDURE — 1101F PT FALLS ASSESS-DOCD LE1/YR: CPT | Performed by: ORTHOPAEDIC SURGERY

## 2022-08-22 PROCEDURE — G8427 DOCREV CUR MEDS BY ELIG CLIN: HCPCS | Performed by: ORTHOPAEDIC SURGERY

## 2022-08-22 PROCEDURE — G8432 DEP SCR NOT DOC, RNG: HCPCS | Performed by: ORTHOPAEDIC SURGERY

## 2022-08-22 NOTE — LETTER
8/23/2022    Patient: Lakshmi Santamaria   YOB: 1928   Date of Visit: 8/22/2022     Yunier Meade MD  [address] [place] [address] [phone]  Via Fax: [phone]    Dear Yunier Meade MD,      Thank you for referring Mr. Jonathan Plaza to [facility] for evaluation. My notes for this consultation are attached. If you have questions, please do not hesitate to call me. I look forward to following your patient along with you.       Sincerely,    Yovani Reyna MD [employer]

## 2022-08-23 NOTE — PROGRESS NOTES
Adráin Wyatt (: 1928) is a 80 y.o. male, patient, here for evaluation of the following chief complaint(s):  Labs (Infection of left knee)       SUBJECTIVE/OBJECTIVE:  Adrián Wyatt presents today for follow-up of painful revision right total knee replacement. He has had swelling in the right knee for quite some time. He is not exactly sure when it started, but symptoms were definitely present before he suffered a fall onto the right knee in the late spring or early summer. He has no pain at night, rarely has aching at rest during the day. Most of his symptoms are activity related. Some of his activities are limited. However, he remains mobile. Denies fever and chills. I aspirated his right knee last week. Fluid analysis at [facility] revealed 13,009 white cells, 92% neutrophils, with anaerobic culture positive for Micrococcus Luteus (broth only). Aerobic culture with no growth to date, but is being incubated for 14 days. Synovasure analysis revealed 8500 white cells with 87% neutrophils, culture positive for strep salivarius. Alpha-defensin positive. Synovasure microbial ID panel positive for staph, negative for candida, p. acnes, and enterococcus. Recent sed rate was 50, C-reactive protein elevated at 3.39 mg/dL. PHYSICAL EXAM:  Vitals: Ht 5' 8\" (1.727 m)   Wt 185 lb (83.9 kg)   BMI 28.13 kg/m²   Body mass index is 28.13 kg/m². 80y.o. year old M, no distress. Ambulates with mild limp on the right side. Pain-free motion right hip. Negative Stinchfield. Right knee is in neutral alignment. Healed midline anterior scar. Moderate knee effusion present, with synovial thickening. Trace warmth. No erythema. No significant focal tenderness. Has about a 5 to 7 degree active extensor lag, full passive extension. Extensor mechanism is intact. Knee flexion past 90 degrees. No gross instability. Symmetrical palpable distal pulses.   No gross motor or sensory deficits in lower extremities. No distal edema. IMAGING:  Radiographs: Most recent right knee x-rays from June demonstrate satisfactory position and alignment of persona revision right total knee components. No evidence of loosening. There is some fragmentation of the lateral patella, more consistent with osteonecrotic changes than fracture. ASSESSMENT/PLAN:  1. Pain due to total right knee replacement, subsequent encounter  2. Status post revision of total replacement of right knee    We had a lengthy discussion. Based on all of the objective data, I think it safe to say he does have a deep infection. However, we need to definitively determine the offending organism. Data reported above is a little bit conflicting regarding the organism. We should reaspirate his knee. However, I do not have a Synovasure kit available today. He will return in a few days to the [facility] office to aspirate his knee. We will send it off both to [facility] and for [facility] analysis again. I discussed his case with one of my infectious disease colleagues. Because one of his cultures grew strep salivarus, endocarditis should be ruled out. I will send him for paired blood cultures. Treatment options include two-stage revision with appropriate antibiotic treatment, 1 stage debridement and polyethylene exchange followed by antibiotic treatment and chronic suppression, versus starting a chronic suppressive treatment course without surgery. Based on his age, I do not think two-stage revision is an option. We will have to make the final decision based on the data received from the upcoming aspiration. Return in about 2 days (around 8/24/2022). Review Of Systems  ROS    Positive for: Musculoskeletal  Last edited by Alvino Ramirez on 8/22/2022  1:21 PM.         Patient denies any recent fever, chills, nausea, vomiting, chest pain, or shortness of breath.     No Known Allergies    Current Outpatient Medications   Medication Sig    tamsulosin (FLOMAX) 0.4 mg capsule Take 1 Cap by mouth daily. Indications: enlarged prostate with urination problem    warfarin (Coumadin) 2.5 mg tablet Take daily or as directed per twice weekly blood tests (INR)  Indications: deep vein thrombosis prevention    cephALEXin (KEFLEX) 250 mg capsule Take 1 Cap by mouth three (3) times daily. Indications: bacterial urinary tract infection    senna-docusate (PERICOLACE) 8.6-50 mg per tablet Take 1 Tab by mouth two (2) times daily as needed for Constipation. acetaminophen (TYLENOL) 500 mg tablet Take 1 Tab by mouth every four (4) hours. amiodarone (PACERONE) 100 mg tablet Take 100 mg by mouth daily. warfarin (COUMADIN) 5 mg tablet Take 5 mg by mouth daily. Indications: MON WED FRI SAT SUN    warfarin (COUMADIN) 2.5 mg tablet Take 2.5 mg by mouth daily. Indications: TUES AND THURS    furosemide (LASIX) 40 mg tablet Take 40 mg by mouth daily. furosemide (LASIX) 20 mg tablet Take  by mouth nightly. celecoxib (CELEBREX) 200 mg capsule Take 200 mg by mouth daily. atorvastatin (LIPITOR) 20 mg tablet Take 1 Tab by mouth daily. Indications: high cholesterol    vit C/E/Zn/coppr/lutein/zeaxan (PRESERVISION AREDS-2 PO) Take  by mouth daily. latanoprost (XALATAN) 0.005 % ophthalmic solution Administer 1 Drop to both eyes nightly. cholecalciferol (VITAMIN D3) (1000 Units /25 mcg) tablet Take 1,000 Units by mouth daily. No current facility-administered medications for this visit.        Past Medical History:   Diagnosis Date    Arthritis     DJD    Cancer ([facility] [address]     prostate    Chronic pain     History of colonoscopy with polypectomy 2005~    Hypercholesterolemia     Other ill-defined conditions(799.89)     cataracts    Thromboembolus ([facility] [address]     3 x left leg, 1 x right leg        Past Surgical History:   Procedure Laterality Date    HX CATARACT REMOVAL Bilateral     HX KNEE REPLACEMENT Left     HX MOHS PROCEDURES  1995 right    HX OTHER SURGICAL  1996    Tony filter insertion    HX PROSTATECTOMY  age 72    HX ROTATOR CUFF REPAIR Right     HX TONSILLECTOMY      MT TOTAL HIP ARTHROPLASTY      left    MT TOTAL KNEE ARTHROPLASTY      right       Family History   Problem Relation Age of Onset    Cancer Mother         STOMACH    Cancer Father         PROSTATE    Heart Disease Brother         MI    Anesth Problems Neg Hx         Social History     Socioeconomic History    Marital status:      Spouse name: Not on file    Number of children: Not on file    Years of education: Not on file    Highest education level: Not on file   Occupational History    Not on file   Tobacco Use    Smoking status: Former    Smokeless tobacco: Never    Tobacco comments:     quit 15 yrs ago   Vaping Use    Vaping Use: Never used   Substance and Sexual Activity    Alcohol use: Yes     Comment: seldom    Drug use: No    Sexual activity: Not Currently   Other Topics Concern    Not on file   Social History Narrative    Not on file     Social Determinants of Health     Financial Resource Strain: Not on file   Food Insecurity: Not on file   Transportation Needs: Not on file   Physical Activity: Not on file   Stress: Not on file   Social Connections: Not on file   Intimate Partner Violence: Not on file   Housing Stability: Not on file       No orders of the defined types were placed in this encounter. An electronic signature was used to authenticate this note.   -- Alix Faria MD

## 2022-08-24 ENCOUNTER — OFFICE VISIT (OUTPATIENT)
Dept: ORTHOPEDIC SURGERY | Age: 87
End: 2022-08-24
Payer: MEDICARE

## 2022-08-24 VITALS — WEIGHT: 185 LBS | HEIGHT: 68 IN | BODY MASS INDEX: 28.04 KG/M2

## 2022-08-24 DIAGNOSIS — T84.84XD PAIN DUE TO TOTAL RIGHT KNEE REPLACEMENT, SUBSEQUENT ENCOUNTER: Primary | ICD-10-CM

## 2022-08-24 DIAGNOSIS — Z96.651 PAIN DUE TO TOTAL RIGHT KNEE REPLACEMENT, SUBSEQUENT ENCOUNTER: Primary | ICD-10-CM

## 2022-08-24 LAB
APPEARANCE SNV: ABNORMAL
BODY FLD TYPE: NORMAL
COLOR SNV: ABNORMAL
CRYSTALS FLD MICRO: NORMAL
LYMPHOCYTES NFR SNV MANUAL: 4 % (ref 0–15)
MONOCYTES NFR SNV MANUAL: 2 % (ref 0–65)
NEUTROPHILS NFR SNV MANUAL: 94 % (ref 0–20)
RBC # SNV: >100 /CU MM
SPECIMEN SOURCE FLD: ABNORMAL
WBC # SNV: ABNORMAL /CU MM (ref 0–150)

## 2022-08-24 PROCEDURE — 20610 DRAIN/INJ JOINT/BURSA W/O US: CPT | Performed by: PHYSICIAN ASSISTANT

## 2022-08-25 NOTE — PROGRESS NOTES
Morris Dial (: 1928) is a 80 y.o. male, patient, here for evaluation of the following chief complaint(s):  Knee Pain (Right )       SUBJECTIVE/OBJECTIVE:  Morris Dial presents today for scheduled knee aspiration of suspected deep infection right total knee. PHYSICAL EXAM:  Vitals: Ht 5' 8\" (1.727 m)   Wt 185 lb (83.9 kg)   BMI 28.13 kg/m²   Body mass index is 28.13 kg/m². 80y.o. year old M in no acute distress. Ambulates with a limp on the right. Mild knee effusion present with synovial thickening. IMAGING:  Radiographs: none today  ASSESSMENT/PLAN:  1. Pain due to total right knee replacement, subsequent encounter  -     CULTURE, BLOOD, PAIRED; Future  -     CELL COUNT, SYNOVIAL; Future  -     CULTURE, BODY FLUID W GRAM STAIN; Future  -     CRYSTALS, SYNOVIAL FLUID; Future    With consent, knee aspirated today with 18g needle, only 5-6cc blood tinged fluid collected. Sent to [facility] lab for cell count, aerobic culture, and crystal analysis. We will also reorder his paired blood cultures to be done at [facility] today. We will be in contact when results are available. No follow-ups on file. Review Of Systems     Patient denies any recent fever, chills, nausea, vomiting, chest pain, or shortness of breath. No Known Allergies    Current Outpatient Medications   Medication Sig    tamsulosin (FLOMAX) 0.4 mg capsule Take 1 Cap by mouth daily. Indications: enlarged prostate with urination problem    warfarin (Coumadin) 2.5 mg tablet Take daily or as directed per twice weekly blood tests (INR)  Indications: deep vein thrombosis prevention    cephALEXin (KEFLEX) 250 mg capsule Take 1 Cap by mouth three (3) times daily. Indications: bacterial urinary tract infection    senna-docusate (PERICOLACE) 8.6-50 mg per tablet Take 1 Tab by mouth two (2) times daily as needed for Constipation. acetaminophen (TYLENOL) 500 mg tablet Take 1 Tab by mouth every four (4) hours.     amiodarone (PACERONE) 100 mg tablet Take 100 mg by mouth daily. warfarin (COUMADIN) 5 mg tablet Take 5 mg by mouth daily. Indications: MON WED FRI SAT SUN    warfarin (COUMADIN) 2.5 mg tablet Take 2.5 mg by mouth daily. Indications: TUES AND THURS    furosemide (LASIX) 40 mg tablet Take 40 mg by mouth daily. furosemide (LASIX) 20 mg tablet Take  by mouth nightly. celecoxib (CELEBREX) 200 mg capsule Take 200 mg by mouth daily. atorvastatin (LIPITOR) 20 mg tablet Take 1 Tab by mouth daily. Indications: high cholesterol    vit C/E/Zn/coppr/lutein/zeaxan (PRESERVISION AREDS-2 PO) Take  by mouth daily. latanoprost (XALATAN) 0.005 % ophthalmic solution Administer 1 Drop to both eyes nightly. cholecalciferol (VITAMIN D3) (1000 Units /25 mcg) tablet Take 1,000 Units by mouth daily. No current facility-administered medications for this visit.        Past Medical History:   Diagnosis Date    Arthritis     DJD    Cancer ([facility] [address]     prostate    Chronic pain     History of colonoscopy with polypectomy 2005~    Hypercholesterolemia     Other ill-defined conditions(799.89)     cataracts    Thromboembolus (HCC)     3 x left leg, 1 x right leg        Past Surgical History:   Procedure Laterality Date    HX CATARACT REMOVAL Bilateral     HX KNEE REPLACEMENT Left     HX MOHS PROCEDURES  1995    right    HX OTHER SURGICAL  1996    Tony filter insertion    HX PROSTATECTOMY  age 72    HX ROTATOR CUFF REPAIR Right     HX TONSILLECTOMY      MA TOTAL HIP ARTHROPLASTY      left    MA TOTAL KNEE ARTHROPLASTY      right       Family History   Problem Relation Age of Onset    Cancer Mother         STOMACH    Cancer Father         PROSTATE    Heart Disease Brother         MI    Anesth Problems Neg Hx         Social History     Socioeconomic History    Marital status:      Spouse name: Not on file    Number of children: Not on file    Years of education: Not on file    Highest education level: Not on file   Occupational History    Not on file   Tobacco Use    Smoking status: Former    Smokeless tobacco: Never    Tobacco comments:     quit 15 yrs ago   Vaping Use    Vaping Use: Never used   Substance and Sexual Activity    Alcohol use: Yes     Comment: seldom    Drug use: No    Sexual activity: Not Currently   Other Topics Concern    Not on file   Social History Narrative    Not on file     Social Determinants of Health     Financial Resource Strain: Not on file   Food Insecurity: Not on file   Transportation Needs: Not on file   Physical Activity: Not on file   Stress: Not on file   Social Connections: Not on file   Intimate Partner Violence: Not on file   Housing Stability: Not on file       Orders Placed This Encounter    CULTURE, BLOOD, PAIRED ([facility] Only)     Standing Status:   Future     Standing Expiration Date:   8/24/2023    CULTURE, BODY FLUID W GRAM STAIN     Standing Status:   Future     Standing Expiration Date:   8/24/2023     Order Specific Question:   Tube Number:     Answer:   1    CELL COUNT, SYNOVIAL     Standing Status:   Future     Standing Expiration Date:   8/24/2023    CRYSTALS, SYNOVIAL FLUID     Standing Status:   Future     Standing Expiration Date:   8/24/2023     Order Specific Question:   Fluid type: Answer:   Joint Fluid [161]      [facility]. David Corral M.D. was available for immediate consultation as the supervising physician. An electronic signature was used to authenticate this note.   -- Bro Balderrama PA-C

## 2022-08-26 LAB
BACTERIA SPEC CULT: ABNORMAL
BACTERIA SPEC CULT: ABNORMAL
BACTERIA SPEC CULT: NORMAL
BACTERIA SPEC CULT: NORMAL
GRAM STN SPEC: NORMAL
GRAM STN SPEC: NORMAL
SERVICE CMNT-IMP: ABNORMAL
SERVICE CMNT-IMP: NORMAL

## 2022-09-07 LAB
BACTERIA SPEC CULT: ABNORMAL
GRAM STN SPEC: ABNORMAL
GRAM STN SPEC: ABNORMAL
SERVICE CMNT-IMP: ABNORMAL

## 2022-09-08 ENCOUNTER — OFFICE VISIT (OUTPATIENT)
Dept: ORTHOPEDIC SURGERY | Age: 87
End: 2022-09-08
Payer: MEDICARE

## 2022-09-08 VITALS — BODY MASS INDEX: 26.52 KG/M2 | HEIGHT: 68 IN | WEIGHT: 175 LBS

## 2022-09-08 DIAGNOSIS — Z96.651 STATUS POST REVISION OF TOTAL REPLACEMENT OF RIGHT KNEE: ICD-10-CM

## 2022-09-08 DIAGNOSIS — T84.53XD INFECTION OF TOTAL RIGHT KNEE REPLACEMENT, SUBSEQUENT ENCOUNTER: Primary | ICD-10-CM

## 2022-09-08 PROCEDURE — G8432 DEP SCR NOT DOC, RNG: HCPCS | Performed by: ORTHOPAEDIC SURGERY

## 2022-09-08 PROCEDURE — G8417 CALC BMI ABV UP PARAM F/U: HCPCS | Performed by: ORTHOPAEDIC SURGERY

## 2022-09-08 PROCEDURE — 1101F PT FALLS ASSESS-DOCD LE1/YR: CPT | Performed by: ORTHOPAEDIC SURGERY

## 2022-09-08 PROCEDURE — 99214 OFFICE O/P EST MOD 30 MIN: CPT | Performed by: ORTHOPAEDIC SURGERY

## 2022-09-08 PROCEDURE — G8536 NO DOC ELDER MAL SCRN: HCPCS | Performed by: ORTHOPAEDIC SURGERY

## 2022-09-08 PROCEDURE — G8427 DOCREV CUR MEDS BY ELIG CLIN: HCPCS | Performed by: ORTHOPAEDIC SURGERY

## 2022-09-08 PROCEDURE — 1123F ACP DISCUSS/DSCN MKR DOCD: CPT | Performed by: ORTHOPAEDIC SURGERY

## 2022-09-08 NOTE — LETTER
9/9/2022    Patient: Nydia Hendrickson   YOB: 1928   Date of Visit: 9/8/2022     Lam Sullivan MD  [address] [facility] [phone]  Via Fax: [phone]    Dear Lam Sullivan MD,      Thank you for referring Mr. Lana Ward to [facility] for evaluation. My notes for this consultation are attached. If you have questions, please do not hesitate to call me. I look forward to following your patient along with you.       Sincerely,    Peter Wilson MD

## 2022-09-09 NOTE — PROGRESS NOTES
Rani Crawford (: 1928) is a 80 y.o. male, patient, here for evaluation of the following chief complaint(s):  Knee Pain (right)       SUBJECTIVE/OBJECTIVE:  Rani Crawford presents today for follow-up of repeat right knee aspiration. He has chronic deep periprosthetic infection. I repeated aspiration of revision right total knee replacement a few weeks ago, as we had isolated 2 different organisms from the same aspiration sample that was sent to 2 different labs. Most recent sample was sent to [facility], where alpha strep was isolated. Fluid analysis from previous aspiration was sent both to [facility] and was sent off for comprehensive  Synovasure analysis. Analysis at [facility] revealed 13,009 white cells, 92% neutrophils, with anaerobic culture positive for Micrococcus Luteus (broth only). Aerobic culture was negative. Synovasure analysis revealed 8500 white cells with 87% neutrophils, culture positive for strep salivarius. Alpha-defensin positive. Synovasure microbial ID panel positive for staph, negative for candida, p. acnes, and enterococcus. Recent sed rate was 50, C-reactive protein elevated at 3.39 mg/dL. He has had swelling in the right knee for quite some time, but according to his family he has not had these issues going all the way back to his surgery last year. Swelling started sometime this past spring. .  Symptoms were definitely present before he suffered a fall onto the right knee in the late spring or early summer. He has no pain at night, rarely has aching at rest during the day. Most of his symptoms are activity related. Some of his activities are limited. However, he remains mobile. Denies fever and chills. PHYSICAL EXAM:  Vitals: Ht 5' 8\" (1.727 m)   Wt 175 lb (79.4 kg)   BMI 26.61 kg/m²   Body mass index is 26.61 kg/m². 80y.o. year old M, no distress. Ambulates with mild limp on the right side. Pain-free motion right hip.   Negative Robert. Right knee is in neutral alignment. Healed midline anterior scar. Moderate knee effusion present, with significant synovial thickening. Trace warmth. No erythema. No significant focal tenderness. Has about a 5 to 7 degree active extensor lag, full passive extension. Extensor mechanism is intact. Knee flexion past 90 degrees. No gross instability. Symmetrical palpable distal pulses. No gross motor or sensory deficits in lower extremities. No distal edema. IMAGING:  Radiographs: Most recent right knee x-rays from June demonstrate satisfactory position and alignment of persona revision right total knee components. No evidence of loosening. There is some fragmentation of the lateral patella, more consistent with osteonecrotic changes than fracture. ASSESSMENT/PLAN:  1. Infection of total right knee replacement, subsequent encounter  2. Status post revision of total replacement of right knee    Deep periprosthetic infection adjacent to revision right total knee replacement. Actual date of onset is unknown, but is definitely chronic. I had another lengthy discussion with the patient and his family. We will never know if this has been present since surgery last year, or was of hematogenous origin. Options include two-stage revision, irrigation and debridement with polyethylene exchange followed by chronic oral suppression, or treating with IV and oral antibiotic therapy alone. With his age and medical comorbidities, I do not believe two-stage treatment with goal of infection cure is a reasonable option. Mr. Feli Hercules and his family agree. Also, antibiotic treatment alone will probably not improve his current situation. We will proceed with irrigation debridement and polyethylene exchange, followed by IV and long-term oral antibiotic therapy. They understand that the goal in this situation is not cure, but chronic suppression. Discussed risks and benefits at length.   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 We will proceed with surgery sometime in the next few weeks. He will hold his warfarin 5 days preoperatively. He will see his primary care physician between now and then. In addition, I discussed his case with one of my infectious disease colleagues, Dr. Hannah Streeter, who will be seeing Mr. Gordon Anguiano prior to surgery as well. When he reports for preadmission testing, paired blood cultures should also be drawn. Return for surgery. Review Of Systems  ROS    Positive for: Musculoskeletal  Last edited by Pan Trevizo on 9/8/2022  2:40 PM.         Patient denies any recent fever, chills, nausea, vomiting, chest pain, or shortness of breath. No Known Allergies    Current Outpatient Medications   Medication Sig    tamsulosin (FLOMAX) 0.4 mg capsule Take 1 Cap by mouth daily. Indications: enlarged prostate with urination problem    warfarin (Coumadin) 2.5 mg tablet Take daily or as directed per twice weekly blood tests (INR)  Indications: deep vein thrombosis prevention    cephALEXin (KEFLEX) 250 mg capsule Take 1 Cap by mouth three (3) times daily. Indications: bacterial urinary tract infection    senna-docusate (PERICOLACE) 8.6-50 mg per tablet Take 1 Tab by mouth two (2) times daily as needed for Constipation. acetaminophen (TYLENOL) 500 mg tablet Take 1 Tab by mouth every four (4) hours. amiodarone (PACERONE) 100 mg tablet Take 100 mg by mouth daily. warfarin (COUMADIN) 5 mg tablet Take 5 mg by mouth daily. Indications: MON WED FRI SAT SUN    warfarin (COUMADIN) 2.5 mg tablet Take 2.5 mg by mouth daily. Indications: TUES AND THURS    furosemide (LASIX) 40 mg tablet Take 40 mg by mouth daily. furosemide (LASIX) 20 mg tablet Take  by mouth nightly. celecoxib (CELEBREX) 200 mg capsule Take 200 mg by mouth daily.     atorvastatin (LIPITOR) 20 mg tablet Take 1 Tab by mouth daily. Indications: high cholesterol    vit C/E/Zn/coppr/lutein/zeaxan (PRESERVISION AREDS-2 PO) Take  by mouth daily. latanoprost (XALATAN) 0.005 % ophthalmic solution Administer 1 Drop to both eyes nightly. cholecalciferol (VITAMIN D3) (1000 Units /25 mcg) tablet Take 1,000 Units by mouth daily. No current facility-administered medications for this visit.        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, 1 x right leg        Past Surgical History:   Procedure Laterality Date    HX CATARACT REMOVAL Bilateral     HX KNEE REPLACEMENT Left     HX MOHS PROCEDURES  1995    right    HX OTHER SURGICAL  1996    Tony filter insertion    HX PROSTATECTOMY  age 72    HX ROTATOR CUFF REPAIR Right     HX TONSILLECTOMY      VT TOTAL HIP ARTHROPLASTY      left    VT TOTAL KNEE ARTHROPLASTY      right       Family History   Problem Relation Age of Onset    Cancer Mother         STOMACH    Cancer Father         PROSTATE    Heart Disease Brother         MI    Anesth Problems Neg Hx         Social History     Socioeconomic History    Marital status:      Spouse name: Not on file    Number of children: Not on file    Years of education: Not on file    Highest education level: Not on file   Occupational History    Not on file   Tobacco Use    Smoking status: Former    Smokeless tobacco: Never    Tobacco comments:     quit 15 yrs ago   Vaping Use    Vaping Use: Never used   Substance and Sexual Activity    Alcohol use: Yes     Comment: seldom    Drug use: No    Sexual activity: Not Currently   Other Topics Concern    Not on file   Social History Narrative    Not on file     Social Determinants of Health     Financial Resource Strain: Not on file   Food Insecurity: Not on file Transportation Needs: Not on file   Physical Activity: Not on file   Stress: Not on file   Social Connections: Not on file   Intimate Partner Violence: Not on file   Housing Stability: Not on file       No orders of the defined types were placed in this encounter. An electronic signature was used to authenticate this note.   -- Swapna Aguirre MD

## 2022-09-13 DIAGNOSIS — T84.53XD INFECTION OF TOTAL RIGHT KNEE REPLACEMENT, SUBSEQUENT ENCOUNTER: Primary | ICD-10-CM

## 2022-09-13 DIAGNOSIS — Z96.651 STATUS POST REVISION OF TOTAL REPLACEMENT OF RIGHT KNEE: ICD-10-CM

## 2022-09-16 ENCOUNTER — HOSPITAL ENCOUNTER (OUTPATIENT)
Dept: PREADMISSION TESTING | Age: 87
Discharge: HOME | End: 2022-09-16
Payer: MEDICARE

## 2022-09-16 VITALS
DIASTOLIC BLOOD PRESSURE: 68 MMHG | WEIGHT: 188.05 LBS | OXYGEN SATURATION: 100 % | HEIGHT: 68 IN | SYSTOLIC BLOOD PRESSURE: 130 MMHG | TEMPERATURE: 97.3 F | BODY MASS INDEX: 28.5 KG/M2 | HEART RATE: 90 BPM

## 2022-09-16 LAB
ABO + RH BLD: NORMAL
ALBUMIN SERPL-MCNC: 3 G/DL (ref 3.5–5)
ALBUMIN/GLOB SERPL: 0.8 {RATIO} (ref 1.1–2.2)
ALP SERPL-CCNC: 143 U/L (ref 45–117)
ALT SERPL-CCNC: 16 U/L (ref 12–78)
ANION GAP SERPL CALC-SCNC: 4 MMOL/L (ref 5–15)
APPEARANCE UR: ABNORMAL
AST SERPL-CCNC: 18 U/L (ref 15–37)
BACTERIA URNS QL MICRO: ABNORMAL /HPF
BASOPHILS # BLD: 0 K/UL (ref 0–0.1)
BASOPHILS NFR BLD: 0 % (ref 0–1)
BILIRUB SERPL-MCNC: 0.5 MG/DL (ref 0.2–1)
BILIRUB UR QL: NEGATIVE
BLOOD GROUP ANTIBODIES SERPL: NORMAL
BUN SERPL-MCNC: 28 MG/DL (ref 6–20)
BUN/CREAT SERPL: 22 (ref 12–20)
CALCIUM SERPL-MCNC: 8.3 MG/DL (ref 8.5–10.1)
CHLORIDE SERPL-SCNC: 104 MMOL/L (ref 97–108)
CO2 SERPL-SCNC: 31 MMOL/L (ref 21–32)
COLOR UR: ABNORMAL
CREAT SERPL-MCNC: 1.28 MG/DL (ref 0.7–1.3)
CRP SERPL-MCNC: 1.69 MG/DL (ref 0–0.6)
DIFFERENTIAL METHOD BLD: ABNORMAL
EOSINOPHIL # BLD: 0.1 K/UL (ref 0–0.4)
EOSINOPHIL NFR BLD: 1 % (ref 0–7)
EPITH CASTS URNS QL MICRO: ABNORMAL /LPF
ERYTHROCYTE [DISTWIDTH] IN BLOOD BY AUTOMATED COUNT: 16.9 % (ref 11.5–14.5)
ERYTHROCYTE [SEDIMENTATION RATE] IN BLOOD: 50 MM/HR (ref 0–20)
EST. AVERAGE GLUCOSE BLD GHB EST-MCNC: 123 MG/DL
GLOBULIN SER CALC-MCNC: 3.9 G/DL (ref 2–4)
GLUCOSE SERPL-MCNC: 94 MG/DL (ref 65–100)
GLUCOSE UR STRIP.AUTO-MCNC: NEGATIVE MG/DL
HBA1C MFR BLD: 5.9 % (ref 4–5.6)
HCT VFR BLD AUTO: 37.5 % (ref 36.6–50.3)
HGB BLD-MCNC: 11.9 G/DL (ref 12.1–17)
HGB UR QL STRIP: NEGATIVE
IMM GRANULOCYTES # BLD AUTO: 0 K/UL (ref 0–0.04)
IMM GRANULOCYTES NFR BLD AUTO: 0 % (ref 0–0.5)
INR PPP: 1.7 (ref 0.9–1.1)
KETONES UR QL STRIP.AUTO: NEGATIVE MG/DL
LEUKOCYTE ESTERASE UR QL STRIP.AUTO: ABNORMAL
LYMPHOCYTES # BLD: 1.5 K/UL (ref 0.8–3.5)
LYMPHOCYTES NFR BLD: 20 % (ref 12–49)
MCH RBC QN AUTO: 27.2 PG (ref 26–34)
MCHC RBC AUTO-ENTMCNC: 31.7 G/DL (ref 30–36.5)
MCV RBC AUTO: 85.8 FL (ref 80–99)
MONOCYTES # BLD: 0.7 K/UL (ref 0–1)
MONOCYTES NFR BLD: 9 % (ref 5–13)
NEUTS SEG # BLD: 5.2 K/UL (ref 1.8–8)
NEUTS SEG NFR BLD: 70 % (ref 32–75)
NITRITE UR QL STRIP.AUTO: POSITIVE
NRBC # BLD: 0 K/UL (ref 0–0.01)
NRBC BLD-RTO: 0 PER 100 WBC
PH UR STRIP: 7 [PH] (ref 5–8)
PLATELET # BLD AUTO: 208 K/UL (ref 150–400)
PMV BLD AUTO: 10.4 FL (ref 8.9–12.9)
POTASSIUM SERPL-SCNC: 4.3 MMOL/L (ref 3.5–5.1)
PROT SERPL-MCNC: 6.9 G/DL (ref 6.4–8.2)
PROT UR STRIP-MCNC: NEGATIVE MG/DL
PROTHROMBIN TIME: 17.6 SEC (ref 9–11.1)
RBC # BLD AUTO: 4.37 M/UL (ref 4.1–5.7)
RBC #/AREA URNS HPF: ABNORMAL /HPF (ref 0–5)
SODIUM SERPL-SCNC: 139 MMOL/L (ref 136–145)
SP GR UR REFRACTOMETRY: 1.02 (ref 1–1.03)
SPECIMEN EXP DATE BLD: NORMAL
UA: UC IF INDICATED,UAUC: ABNORMAL
UROBILINOGEN UR QL STRIP.AUTO: 1 EU/DL (ref 0.2–1)
WBC # BLD AUTO: 7.5 K/UL (ref 4.1–11.1)
WBC URNS QL MICRO: ABNORMAL /HPF (ref 0–4)

## 2022-09-16 PROCEDURE — 81001 URINALYSIS AUTO W/SCOPE: CPT

## 2022-09-16 PROCEDURE — 86900 BLOOD TYPING SEROLOGIC ABO: CPT

## 2022-09-16 PROCEDURE — 86140 C-REACTIVE PROTEIN: CPT

## 2022-09-16 PROCEDURE — 93005 ELECTROCARDIOGRAM TRACING: CPT

## 2022-09-16 PROCEDURE — 83036 HEMOGLOBIN GLYCOSYLATED A1C: CPT

## 2022-09-16 PROCEDURE — 87186 SC STD MICRODIL/AGAR DIL: CPT

## 2022-09-16 PROCEDURE — 85652 RBC SED RATE AUTOMATED: CPT

## 2022-09-16 PROCEDURE — 36415 COLL VENOUS BLD VENIPUNCTURE: CPT

## 2022-09-16 PROCEDURE — 87077 CULTURE AEROBIC IDENTIFY: CPT

## 2022-09-16 PROCEDURE — 87040 BLOOD CULTURE FOR BACTERIA: CPT

## 2022-09-16 PROCEDURE — 85025 COMPLETE CBC W/AUTO DIFF WBC: CPT

## 2022-09-16 PROCEDURE — 87086 URINE CULTURE/COLONY COUNT: CPT

## 2022-09-16 PROCEDURE — 80053 COMPREHEN METABOLIC PANEL: CPT

## 2022-09-16 PROCEDURE — 85610 PROTHROMBIN TIME: CPT

## 2022-09-16 NOTE — PERIOP NOTES
Preoperative instructions reviewed with patient and daughter. Patient given two bottles CHG soap. Instructions reviewed on use of CHG soap. Patient given SSI infection sheet. MRSA/MSSA treatment instruction sheet given with an explanation to patient that they  will be notified if treatment instructions need to be initiated. Patient was given the opportunity to ask questions on the information provided.

## 2022-09-16 NOTE — PERIOP NOTES
[address] INSTRUCTIONS  ORTHOPAEDIC    Surgery Date:   9-21-22    Your surgeon's office or [facility] staff will call you between 4 PM- 8 PM the day before surgery with your arrival time. If your surgery is on a Monday, you will receive a call the preceding Friday. Please report to [facility] Patient Access/Admitting on the 1st floor. Bring your insurance card, photo identification, and any copayment (if applicable). If you are going home the same day of your surgery, you must have a responsible adult to drive you home. You need to have a responsible adult to stay with you the first 24 hours after surgery and you should not drive a car for 24 hours following your surgery. Do NOT eat any solid foods after midnight the night before surgery including candy, mints or gum. You may drink clear liquids from midnight until 1 hour prior to arrival time. You may drink up to 12 ounces at one time every 4 hours. Do NOT drink alcohol or smoke 24 hours before surgery. STOP smoking for 14 days prior as it helps with breathing and healing after surgery. If your arrival time is 3pm or later, you may eat a light breakfast before 8am (toast, bagel-no butter, black coffee, plain tea, fruit juice-no pulp) Please note special instructions, if applicable, below for medications. If you are being admitted to the hospital,please leave personal belongings/luggage in your car until you have an assigned hospital room number. Please wear comfortable clothes. Wear your glasses instead of contacts. We ask that all money, jewelry and valuables be left at home. Wear no make up, particularly mascara, the day of surgery. All body piercings, rings, and jewelry need to be removed and left at home. Please remove any nail polish or artificial nails from your fingernails. Please wear your hair loose or down. Please no pony-tails, buns, or any metal hair accessories.  If you shower the morning of surgery, please do not apply any lotions or powders afterwards. You may wear deodorant. Do not shave any body area within 24 hours of your surgery. Please follow all instructions to avoid any potential surgical cancellation. Should your physical condition change, (i.e. fever, cold, flu, etc.) please notify your surgeon as soon as possible. It is important to be on time. If a situation occurs where you may be delayed, please call:  [phone] / [phone] on the day of surgery. The Preadmission Testing staff can be reached at [phone]. Special instructions: BRING WALKER, EYE DROPS AND COPY ADVANCED DIRECTIVE TO HOSPITAL    Current Outpatient Medications   Medication Sig    acetaminophen (TYLENOL) 500 mg tablet Take 1 Tab by mouth every four (4) hours. (Patient taking differently: Take 1,000 mg by mouth every four (4) hours.)    warfarin (COUMADIN) 2.5 mg tablet Take 5 mg by mouth daily. Indications: TUES AND THURS    furosemide (LASIX) 40 mg tablet Take 40 mg by mouth daily. furosemide (LASIX) 20 mg tablet Take  by mouth nightly. celecoxib (CELEBREX) 200 mg capsule Take 200 mg by mouth daily. atorvastatin (LIPITOR) 20 mg tablet Take 1 Tab by mouth daily. Indications: high cholesterol (Patient taking differently: Take 20 mg by mouth nightly. Indications: high cholesterol)    vit C/E/Zn/coppr/lutein/zeaxan (PRESERVISION AREDS-2 PO) Take 1 Tablet by mouth two (2) times a day. latanoprost (XALATAN) 0.005 % ophthalmic solution Administer 1 Drop to both eyes nightly. cholecalciferol (VITAMIN D3) (1000 Units /25 mcg) tablet Take 1,000 Units by mouth daily. No current facility-administered medications for this encounter.        YOU MUST ONLY TAKE THESE MEDICATIONS THE MORNING OF SURGERY WITH A SIP OF WATER: NONE  MEDICATIONS TO TAKE THE MORNING OF SURGERY ONLY IF NEEDED: TYLENOL  HOLD FUROSEMIDE DAY OF SURGERY  STOP 9/16/22  these prescription medications BEFORE Surgery: AREDS SUPPLEMENT  Ask your surgeon/prescribing physician about when/if to STOP taking these medications: WARFARIN  Stop any non-steroidal anti-inflammatory drugs (i.e. Ibuprofen, Naproxen, Advil, Aleve) 3 days before surgery. You may take Tylenol. STOP all vitamins and herbal supplements 1 week prior to  surgery. If you are currently taking Plavix, Coumadin, or any other blood-thinning/anticoagulant medication contact your prescribing physician for instructions. Preventing Infections Before and After - Your Surgery    IMPORTANT INSTRUCTIONS    You play an important role in your health and preparation for surgery. To reduce the germs on your skin you will need to shower with CHG soap (Chorhexidine gluconate 4%) two times before surgery. CHG soap (Hibiclens, Hex-A-Clens or store brand)  CHG soap will be provided at your Preadmission Testing (PAT) appointment. If you do not have a PAT appointment before surgery, you may arrange to  CHG soap from our office or purchase CHG soap at a pharmacy, grocery or department store. You need to purchase TWO 4 ounce bottles to use for your 2 showers. Steps to follow:  Neah Eth your hair with your normal shampoo and your body with regular soap and rinse well to remove shampoo and soap from your skin. Wet a clean washcloth and turn off the shower. Put CHG soap on washcloth and apply to your entire body from the neck down. Do not use on your head, face or private parts(genitals). Do not use CHG soap on open sores, wounds or areas of skin irritation. Wash you body gently for 5 minutes. Do not wash your skin too hard. This soap does not create lather. Pay special attention to your underarms and from your belly button to your feet. Turn the shower back on and rinse well to get CHG soap off your body. Pat your skin dry with a clean, dry towel. Do not apply lotions or moisturizer. Put on clean clothes and sleep on fresh bed sheets and do not allow pets to sleep with you.     Shower with CHG soap 2 times before your surgery  The evening before your surgery  The morning of your surgery      Tips to help prevent infections after your surgery:  Protect your surgical wound from germs:  Hand washing is the most important thing you and your caregivers can do to prevent infections. Keep your bandage clean and dry! Do not touch your surgical wound. Use clean, freshly washed towels and washcloths every time you shower; do not share bath linens with others. Until your surgical wound is healed, wear clothing and sleep on bed linens each day that are clean and freshly washed. Do not allow pets to sleep in your bed with you or touch your surgical wound. Do not smoke - smoking delays wound healing. This may be a good time to stop smoking. If you have diabetes, it is important for you to manage your blood sugar levels properly before your surgery as well as after your surgery. Poorly managed blood sugar levels slow down wound healing and prevent you from healing completely. Prevention of Infection  Testing for Staphylococcus aureus on your skin before surgery    Staphylococcus aureus (staph) is a common bacteria that is found on the body. It normally does not cause infection on healthy skin. Before surgery, you will be tested to see if you have staph by swabbing the inside of your nose. When you have an incision with surgery, the goal is to protect that incision from infection. Removal of the staph bacteria before surgery can decrease the risk of a surgical site infection. If your nose swab is positive for staph you will be called. Your treatment will include 2 steps:  Prescription for Mupirocin ointment to be used in each nostril twice a day for 5 days. Showering with Chlorhexidine (CHG) liquid soap for 5 days prior to surgery. How to use Mupirocin ointment in your nose   the prescription from your pharmacy.  You will receive a large tube of ointment which will be big enough for all of your treatments. You will apply this ointment to each nostril 2 times a day for 5 days. Wash your hands with  gel or soap and water for 20 seconds before using ointment. Place a pea-sized amount of ointment on a cotton Q-tip. Apply ointment just inside of each nostril with the Q-tip. Do not push Q-tip or ointment deep inside you nose. Press your nostrils together and massage for a few seconds. Wash your hands with  gel or soap and water after you are finished. Do not get ointment near your eyes. If it gets into your eyes, rinse them with cool water. If you need to use nasal spray, clean the tip of the bottle with alcohol before use and do not use both at the same time. If you are scheduled for COVID testing during the 5 days, do NOT apply morning dose until after the COVID test has been performed. How to use Chlorhexidine (CHG) 4% liquid soap  Purchase an 8 ounce bottle of CHG liquid soap (Chlorhexidine 4%, Hibiclens, Hex-A-Clens or store brand) at a pharmacy or grocery store. Wash your hair with your normal shampoo and your body with regular soap and rinse well to remove shampoo and soap from your skin. Wet a clean washcloth and turn off the shower. Put CHG soap on washcloth and apply to your entire body from the neck down. Do not use on your head, face or private parts(genitals). Do not use CHG soap on open sores, wounds or areas of skin irritation. Wash your body gently for 5 minutes. Do not wash your skin too hard. This soap does not create lather. Pay special attention to your underarms and from your belly button to your feet. Turn the shower back on and rinse well to get CHG soap off your body. Pat your skin dry with a clean, dry towel. Do not apply lotions or moisturizer. Put on clean clothes and sleep on fresh bed sheets the night before surgery. Do not allow pets to sleep with you.     Eating and Drinking Before Surgery    You may eat a regular dinner at the usual time on the day before your surgery. Do NOT eat any solid foods after midnight unless your arrival time at the hospital is 3pm or later. You may drink clear liquids only from 12 midnight until 1 hours prior to your arrival time at the hospital on the day of your surgery. Do NOT drink alcohol. Clear liquids include:  Water  Fruit juices without pulp( i.e. apple juice)  Carbonated beverages  Black coffee (no cream/milk)  Tea (no cream/milk)  Gatorade  You may drink up to 12-16 ounces at one time every 4 hours between the hours of midnight and 1 hour before your arrival time at the hospital. Example- if your arrival time at the hospital is 6am, you may drink 12-16 ounces of clear liquids no later than 5am.  If your arrival time at the hospital is 3pm or later, you may eat a light breakfast before 8am.  A light breakfast includes: Toast or bagel (no butter)  Black coffee (no cream/milk)  Tea (no cream/milk)  Fruit juices without pulp ( i.e. apple juice)  Do NOT eat meat, eggs, vegetables or fruit  If you have any questions, please contact your surgeon's office. Patient Information Regarding COVID Restrictions    Day of Procedure    Please park in the parking deck or any designated visitor parking lot. Enter the facility through the Main Entrance of the hospital.  On the day of surgery, please provide the cell phone number of the person who will be waiting for you to the Patient Access representative at the time of registration. Please wear a mask on the day of your procedure. We are now allowing two designated visitors per stay. Pediatric patients may have 2 designated visitors. These two people may come in with you on the day of your procedure. The designated visitor must also wear a mask.   Once your procedure and the immediate recovery period is completed, a nurse in the recovery area will contact your designated visitor to inform them of your room number or to otherwise review other pertinent information regarding your care. Social distancing practices are to be adhered to in waiting areas and the cafeteria. The patient and daughter were contacted in person. They verbalized understanding of all instructions do not  need reinforcement.

## 2022-09-17 LAB
BACTERIA SPEC CULT: NORMAL
BACTERIA SPEC CULT: NORMAL
CALCULATED R AXIS, ECG10: -21 DEGREES
CALCULATED T AXIS, ECG11: 6 DEGREES
DIAGNOSIS, 93000: NORMAL
Q-T INTERVAL, ECG07: 402 MS
QRS DURATION, ECG06: 136 MS
QTC CALCULATION (BEZET), ECG08: 481 MS
SERVICE CMNT-IMP: NORMAL
VENTRICULAR RATE, ECG03: 86 BPM

## 2022-09-19 LAB
BACTERIA SPEC CULT: ABNORMAL
CC UR VC: ABNORMAL
SERVICE CMNT-IMP: ABNORMAL

## 2022-09-19 NOTE — PERIOP NOTES
PAT Nurse Practitioner   Pre-Operative Chart Review/Assessment:-ORTHOPEDIC                Patient Name:  Lakshmi Santamaria                                                           Age:   80 y.o.    :  1928     Today's Date:  2022     Date of PAT:   2022      Date of Surgery:    2022      Procedure(s):  Right Knee I&D with Polyethylene Exchange     Surgeon:   Dr. Anthony Freitas:      1)  Medical Clearance/PCP:  Daniela Arreaga MD      2)  Cardiac Clearance:  Pt followed by Dr. Roger([facility]). LOV 22. Condition stable, no changes made to current regimen. OV note and EKG on chart and scanned under media. PAT EKG showed A-fib; HR 86, RBBB . 3)  Diabetic Treatment Consult:  Not indicated. A1c-5.9      4)  Sleep Apnea evaluation:   Not indicated.  KYMBERLY Score 3.       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         ____________________________________________  PAST MEDICAL HISTORY  Past Medical History:   Diagnosis Date    Arthritis     DJD    Cancer ([facility] [address]     prostate    Cancer ([facility] [address]     SKIN CA RIGHT EYEBROW AREA    Chronic pain     History of colonoscopy with polypectomy ~    Hypercholesterolemia     Macular degeneration     Other ill-defined conditions(799.89)     cataracts    PAF (paroxysmal atrial fibrillation) (HCC)     Thromboembolus (HCC)     3 x left leg, 1 x right leg       ____________________________________________  PAST SURGICAL HISTORY  Past Surgical History:   Procedure Laterality Date    HX CATARACT REMOVAL Bilateral     HX KNEE REPLACEMENT Left     HX MOHS PROCEDURES      right    HX OTHER SURGICAL  1996    Hector filter insertion    HX PROSTATECTOMY  age 72    HX ROTATOR CUFF REPAIR Right     HX TONSILLECTOMY      ME LIGMT REVISION,KNEE,EXTRA-ARTIC Right 04/2021    ME TOTAL HIP ARTHROPLASTY      left    ME TOTAL KNEE ARTHROPLASTY      right    VASCULAR SURGERY PROCEDURE UNLIST      1996 IVC FILTER      ____________________________________________  HOME MEDICATIONS  Current Outpatient Medications   Medication Sig    acetaminophen (TYLENOL) 500 mg tablet Take 1 Tab by mouth every four (4) hours. (Patient taking differently: Take 1,000 mg by mouth every four (4) hours.)    warfarin (COUMADIN) 2.5 mg tablet Take 5 mg by mouth daily. Indications: TUES AND THURS    furosemide (LASIX) 40 mg tablet Take 40 mg by mouth daily. furosemide (LASIX) 20 mg tablet Take  by mouth nightly. celecoxib (CELEBREX) 200 mg capsule Take 200 mg by mouth daily. atorvastatin (LIPITOR) 20 mg tablet Take 1 Tab by mouth daily. Indications: high cholesterol (Patient taking differently: Take 20 mg by mouth nightly. Indications: high cholesterol)    vit C/E/Zn/coppr/lutein/zeaxan (PRESERVISION AREDS-2 PO) Take 1 Tablet by mouth two (2) times a day. latanoprost (XALATAN) 0.005 % ophthalmic solution Administer 1 Drop to both eyes nightly. cholecalciferol (VITAMIN D3) (1000 Units /25 mcg) tablet Take 1,000 Units by mouth daily.      No current facility-administered medications for this encounter.      ____________________________________________  ALLERGIES  Allergies   Allergen Reactions    Tape [Adhesive] Other (comments)     REDNESS, SKIN TEARS - BANDAID, PAPER TAPE      ____________________________________________  SOCIAL HISTORY  Social History     Tobacco Use    Smoking status: Former     Types: Cigars     Quit date: 2012     Years since quitting: 10.7    Smokeless tobacco: Never    Tobacco comments:     quit 15 yrs ago   Substance Use Topics    Alcohol use: Not Currently ____________________________________________   Internal Administration   First Dose COVID-19, CAN BLUE border, Primary or Immunocompromised, (age 18y+), IM, 100 mcg/0.5mL  01/27/2021   Second Dose COVID-19, CAN BLUE border, Primary or Immunocompromised, (age 18y+), IM, 100 mcg/0.5mL  02/23/2021      Last COVID Lab SARS-CoV-2 ( )   Date Value   04/16/2021 Not Detected     SARS-CoV-2, PCR ( )   Date Value   04/23/2021 Not detected                    Labs:     Hospital Outpatient Visit on 09/16/2022   Component Date Value Ref Range Status    Special Requests: 09/16/2022 NO SPECIAL REQUESTS    Preliminary    Culture result: 09/16/2022 NO GROWTH 2 DAYS    Preliminary    Special Requests: 09/16/2022 NO SPECIAL REQUESTS    Preliminary    Culture result: 09/16/2022 NO GROWTH 2 DAYS    Preliminary    Crossmatch Expiration 09/16/2022 09/24/2022,2359   Final    ABO/Rh(D) 09/16/2022 B POSITIVE   Final    Antibody screen 09/16/2022 NEG   Final    INR 09/16/2022 1.7 (A) 0.9 - 1.1   Final    A single therapeutic range for Vit K antagonists may not be optimal for all indications - see June, 2008 issue of Chest, American College of Chest Physicians Evidence-Based Clinical Practice Guidelines, 8th Edition.     Prothrombin time 09/16/2022 17.6 (A) 9.0 - 11.1 sec Final    Color 09/16/2022 YELLOW/STRAW    Final    Color Reference Range: Straw, Yellow or Dark Yellow    Appearance 09/16/2022 CLOUDY (A) CLEAR   Final    Specific gravity 09/16/2022 1.020  1.003 - 1.030   Final    pH (UA) 09/16/2022 7.0  5.0 - 8.0   Final    Protein 09/16/2022 Negative  NEG mg/dL Final    Glucose 09/16/2022 Negative  NEG mg/dL Final    Ketone 09/16/2022 Negative  NEG mg/dL Final    Bilirubin 09/16/2022 Negative  NEG   Final    Blood 09/16/2022 Negative  NEG   Final    Urobilinogen 09/16/2022 1.0  0.2 - 1.0 EU/dL Final    Nitrites 09/16/2022 Positive (A) NEG   Final    Leukocyte Esterase 09/16/2022 MODERATE (A) NEG   Final    WBC 09/16/2022 10-20  0 - 4 /hpf Final    RBC 09/16/2022 0-5  0 - 5 /hpf Final    Epithelial cells 09/16/2022 FEW  FEW /lpf Final    Epithelial cell category consists of squamous cells and /or transitional urothelial cells. Renal tubular cells, if present, are separately identified as such. Bacteria 09/16/2022 4+ (A) NEG /hpf Final    UA:UC IF INDICATED 09/16/2022 URINE CULTURE ORDERED (A) CNI   Final    Ventricular Rate 09/16/2022 86  BPM Final    QRS Duration 09/16/2022 136  ms Final    Q-T Interval 09/16/2022 402  ms Final    QTC Calculation (Bezet) 09/16/2022 481  ms Final    Calculated R Axis 09/16/2022 -21  degrees Final    Calculated T Axis 09/16/2022 6  degrees Final    Diagnosis 09/16/2022    Final                    Value:Atrial fibrillation with premature ventricular or aberrantly conducted   complexes  Right bundle branch block  Abnormal ECG  When compared with ECG of 12-APR-2021 11:18,  Atrial fibrillation has replaced Sinus rhythm  Vent. rate has increased BY  35 BPM  Confirmed by Stewart Chilel MD, [facility] ([id number]) on 9/17/2022 9:08:05 PM      Hemoglobin A1c 09/16/2022 5.9 (A) 4.0 - 5.6 % Final    Comment: NEW METHOD  PLEASE NOTE NEW REFERENCE RANGE  (NOTE)  HbA1C Interpretive Ranges  <5.7              Normal  5.7 - 6.4         Consider Prediabetes  >6.5              Consider Diabetes      Est. average glucose 09/16/2022 123  mg/dL Final    Special Requests: 09/16/2022 NO SPECIAL REQUESTS    Final    Culture result: 09/16/2022 MRSA NOT PRESENT    Final    Culture result: 09/16/2022     Final                    Value:Screening of patient nares for MRSA is for surveillance purposes and, if positive, to facilitate isolation considerations in high risk settings. It is not intended for automatic decolonization interventions per se as regimens are not sufficiently effective to warrant routine use.       WBC 09/16/2022 7.5  4.1 - 11.1 K/uL Final    RBC 09/16/2022 4.37  4.10 - 5.70 M/uL Final    HGB 09/16/2022 11.9 (A) 12.1 - 17.0 g/dL Final    HCT 09/16/2022 37.5  36.6 - 50.3 % Final    MCV 09/16/2022 85.8  80.0 - 99.0 FL Final    MCH 09/16/2022 27.2  26.0 - 34.0 PG Final    MCHC 09/16/2022 31.7  30.0 - 36.5 g/dL Final    RDW 09/16/2022 16.9 (A) 11.5 - 14.5 % Final    PLATELET 33/66/8824 290  150 - 400 K/uL Final    MPV 09/16/2022 10.4  8.9 - 12.9 FL Final    NRBC 09/16/2022 0.0  0  WBC Final    ABSOLUTE NRBC 09/16/2022 0.00  0.00 - 0.01 K/uL Final    NEUTROPHILS 09/16/2022 70  32 - 75 % Final    LYMPHOCYTES 09/16/2022 20  12 - 49 % Final    MONOCYTES 09/16/2022 9  5 - 13 % Final    EOSINOPHILS 09/16/2022 1  0 - 7 % Final    BASOPHILS 09/16/2022 0  0 - 1 % Final    IMMATURE GRANULOCYTES 09/16/2022 0  0.0 - 0.5 % Final    ABS. NEUTROPHILS 09/16/2022 5.2  1.8 - 8.0 K/UL Final    ABS. LYMPHOCYTES 09/16/2022 1.5  0.8 - 3.5 K/UL Final    ABS. MONOCYTES 09/16/2022 0.7  0.0 - 1.0 K/UL Final    ABS. EOSINOPHILS 09/16/2022 0.1  0.0 - 0.4 K/UL Final    ABS. BASOPHILS 09/16/2022 0.0  0.0 - 0.1 K/UL Final    ABS. IMM. GRANS. 09/16/2022 0.0  0.00 - 0.04 K/UL Final    DF 09/16/2022 AUTOMATED    Final    Sodium 09/16/2022 139  136 - 145 mmol/L Final    Potassium 09/16/2022 4.3  3.5 - 5.1 mmol/L Final    Chloride 09/16/2022 104  97 - 108 mmol/L Final    CO2 09/16/2022 31  21 - 32 mmol/L Final    Anion gap 09/16/2022 4 (A) 5 - 15 mmol/L Final    Glucose 09/16/2022 94  65 - 100 mg/dL Final    BUN 09/16/2022 28 (A) 6 - 20 MG/DL Final    Creatinine 09/16/2022 1.28  0.70 - 1.30 MG/DL Final    BUN/Creatinine ratio 09/16/2022 22 (A) 12 - 20   Final    GFR est AA 09/16/2022 >60  >60 ml/min/1.73m2 Final    GFR est non-AA 09/16/2022 52 (A) >60 ml/min/1.73m2 Final    Estimated GFR is calculated using the IDMS-traceable Modification of Diet in Renal Disease (MDRD) Study equation, reported for both  Americans (GFRAA) and non- Americans (GFRNA), and normalized to 1.73m2 body surface area. The physician must decide which value applies to the patient. Calcium 09/16/2022 8.3 (A) 8.5 - 10.1 MG/DL Final    Bilirubin, total 09/16/2022 0.5  0.2 - 1.0 MG/DL Final    ALT (SGPT) 09/16/2022 16  12 - 78 U/L Final    AST (SGOT) 09/16/2022 18  15 - 37 U/L Final    Alk. phosphatase 09/16/2022 143 (A) 45 - 117 U/L Final    Protein, total 09/16/2022 6.9  6.4 - 8.2 g/dL Final    Albumin 09/16/2022 3.0 (A) 3.5 - 5.0 g/dL Final    Globulin 09/16/2022 3.9  2.0 - 4.0 g/dL Final    A-G Ratio 09/16/2022 0.8 (A) 1.1 - 2.2   Final    Sed rate, automated 09/16/2022 50 (A) 0 - 20 mm/hr Final    C-Reactive protein 09/16/2022 1.69 (A) 0.00 - 0.60 mg/dL Final    Comment: CRP is a nonspecific acute phase reactant that shows rapid, marked increases with inflammation, infection, trauma, tissue necrosis, malignancies and autoimmune diseases. Sequential CRP levels are useful in monitoring response to antibacterial therapy. This assay is not equivalent to the hsCRP test since the presence of one or more of the foregoing disease processes obviates the risk stratification information available from hsCRP testing.       Special Requests: 09/16/2022     Preliminary                    Value:NO SPECIAL REQUESTS  Reflexed from [id number]      Celoron Count 09/16/2022     Preliminary                    Value:>100,000  COLONIES/mL      Culture result: 09/16/2022 GRAM NEGATIVE RODS IDENTIFICATION AND SUSCEPTIBILITY TO FOLLOW (A)   Preliminary    Culture result: 09/16/2022 CHECKING FOR POSSIBLE 2ND GRAM NEGATIVE JOHN (A)   Preliminary   Orders Only on 08/24/2022   Component Date Value Ref Range Status    FLUID TYPE(7) 08/24/2022 NO SOURCE PROVIDED    Final    Crystals, body fluid 08/24/2022 NO CRYSTALS SEEN WITH POLARIZED LIGHT    Final   Orders Only on 08/24/2022   Component Date Value Ref Range Status    Special Requests: 08/24/2022 NO SPECIAL REQUESTS    Final    GRAM STAIN 08/24/2022 OCCASIONAL WBCS SEEN    Final    GRAM STAIN 08/24/2022 NO ORGANISMS SEEN    Final    Culture result: 08/24/2022 ALPHA STREPTOCOCCUS ISOLATED FROM [facility]IO [facility] ONLY (A)   Final    Culture result: 08/24/2022 NO GROWTH ON SOLID MEDIA AT 14 DAYS    Final    Culture result: 08/24/2022    Final                    Value:(NOTE) PRELIMINARY REPORT OF GRAM POSITIVE COCCI IN CHAINS GROWING FROM THE PRELIMINARY REPORT OF GRAM POSITIVE COCCI IN CHAINS GROWING FROM THE THIO [facility]OTH, CALLED TO AND READ BACK BY EHSAN POLK, 0/12/32 2052 SCP. [facility]IO [facility], CALLED TO AND READ BACK BY EHSAN POLK, 5/03/93 0856 SCP. Orders Only on 08/24/2022   Component Date Value Ref Range Status    SYNOVIAL FLD SITE 08/24/2022 NO SOURCE PROVIDED    Final    SYN FLUID COLOR 08/24/2022 RED    Final    Comment: No reference range has been established. Recommend correlation with the clinical setting to determine the significance of these results. SYN FLUID APPEARANCE 08/24/2022 TURBID    Final    Comment: No reference range has been established. Recommend correlation with the clinical setting to determine the significance of these results. SYNOVIAL FLD RBC CT 08/24/2022 >100 (A) 0 /cu mm Final    Comment: No reference range has been established. Recommend correlation with the clinical setting to determine the significance of these results.       SYNOVIAL FLD WBC CT 08/24/2022 13,640 (A) 0 - 150 /cu mm Final    VERIFIED BY DILUTION    SYNOVIAL FLD SEGS 08/24/2022 94 (A) 0 - 20 % Final    SYNOVIAL FLD LYMPHS 08/24/2022 4  0 - 15 % Final    SYNOVIAL FLD MONOS 08/24/2022 2  0 - 65 % Final   Orders Only on 08/11/2022   Component Date Value Ref Range Status    Special Requests: 08/11/2022 NO SPECIAL REQUESTS    Final    GRAM STAIN 08/11/2022 FEW WBCS SEEN    Final    GRAM STAIN 08/11/2022 NO ORGANISMS SEEN    Final    Culture result: 08/11/2022 Culture performed on Unspun Fluid    Final    Culture result: 08/11/2022 NO GROWTH 14 DAYS    Final   Orders Only on 08/11/2022   Component Date Value Ref Range Status    Special Requests: 08/11/2022 NO SPECIAL REQUESTS Final    Culture result: 08/11/2022 NO GROWTH ON SOLID MEDIA IN 14 DAYS   Final    Culture result: 08/11/2022 MICROCOCCUS LUTEUS ISOLATED FROM THIO BROTH ONLY (AEROBIC) (A)   Final   Orders Only on 08/11/2022   Component Date Value Ref Range Status    SYNOVIAL FLD SITE 08/11/2022 NO SOURCE PROVIDED    Final    SYN FLUID COLOR 08/11/2022 RED    Final    Comment: No reference range has been established. Recommend correlation with the clinical setting to determine the significance of these results. SYN FLUID APPEARANCE 08/11/2022 BLOODY    Final    Comment: No reference range has been established. Recommend correlation with the clinical setting to determine the significance of these results. SYNOVIAL FLD RBC CT 08/11/2022 >100 (A) 0 /cu mm Final    Comment: No reference range has been established. Recommend correlation with the clinical setting to determine the significance of these results. SYNOVIAL FLD WBC CT 08/11/2022 13,164 (A) 0 - 150 /cu mm Final    VERIFIED BY DILUTION    SYNOVIAL FLD SEGS 08/11/2022 92 (A) 0 - 20 % Final    SYNOVIAL FLD LYMPHS 08/11/2022 1  0 - 15 % Final    SYNOVIAL FLD MONOS 08/11/2022 7  0 - 65 % Final       Skin:     Denies open wounds, cuts, sores, rashes or other areas of concern in PAT assessment.           Gosia Hayden NP  Available via [address] [place] [address]

## 2022-09-20 ENCOUNTER — ANESTHESIA EVENT (OUTPATIENT)
Dept: SURGERY | Age: 87
DRG: 467 | End: 2022-09-20
Payer: MEDICARE

## 2022-09-21 ENCOUNTER — HOSPITAL ENCOUNTER (INPATIENT)
Age: 87
LOS: 9 days | Discharge: NURSING, REHAB OR LONG-TERM CARE | DRG: 467 | End: 2022-09-30
Attending: ORTHOPAEDIC SURGERY | Admitting: ORTHOPAEDIC SURGERY
Payer: MEDICARE

## 2022-09-21 ENCOUNTER — ANESTHESIA (OUTPATIENT)
Dept: SURGERY | Age: 87
DRG: 467 | End: 2022-09-21
Payer: MEDICARE

## 2022-09-21 DIAGNOSIS — Z96.659 INFECTION OF TOTAL KNEE REPLACEMENT, INITIAL ENCOUNTER (HCC): ICD-10-CM

## 2022-09-21 DIAGNOSIS — T84.022A INSTABILITY OF INTERNAL RIGHT KNEE PROSTHESIS, INITIAL ENCOUNTER (HCC): Primary | ICD-10-CM

## 2022-09-21 DIAGNOSIS — T84.59XA INFECTION OF TOTAL KNEE REPLACEMENT, INITIAL ENCOUNTER (HCC): ICD-10-CM

## 2022-09-21 LAB
INR BLD: 1.8 (ref 0.9–1.2)
INR PPP: 1.2 (ref 0.9–1.1)
PROTHROMBIN TIME: 12.8 SEC (ref 9–11.1)

## 2022-09-21 PROCEDURE — 87205 SMEAR GRAM STAIN: CPT

## 2022-09-21 PROCEDURE — 77030033067 HC SUT PDO STRATFX SPIR J&J -B: Performed by: ORTHOPAEDIC SURGERY

## 2022-09-21 PROCEDURE — 77030020274 HC MISC IMPL ORTHOPEDIC: Performed by: ORTHOPAEDIC SURGERY

## 2022-09-21 PROCEDURE — 87075 CULTR BACTERIA EXCEPT BLOOD: CPT

## 2022-09-21 PROCEDURE — 77030002966 HC SUT PDS J&J -A: Performed by: ORTHOPAEDIC SURGERY

## 2022-09-21 PROCEDURE — 74011250636 HC RX REV CODE- 250/636: Performed by: ANESTHESIOLOGY

## 2022-09-21 PROCEDURE — 74011000250 HC RX REV CODE- 250: Performed by: PHYSICIAN ASSISTANT

## 2022-09-21 PROCEDURE — 27486 REVISE/REPLACE KNEE JOINT: CPT | Performed by: PHYSICIAN ASSISTANT

## 2022-09-21 PROCEDURE — 77030040922 HC BLNKT HYPOTHRM STRY -A

## 2022-09-21 PROCEDURE — 77030040830 HC CATH URETH FOL MDII -A: Performed by: ORTHOPAEDIC SURGERY

## 2022-09-21 PROCEDURE — 0SPV0JZ REMOVAL OF SYNTHETIC SUBSTITUTE FROM RIGHT KNEE JOINT, TIBIAL SURFACE, OPEN APPROACH: ICD-10-PCS | Performed by: ORTHOPAEDIC SURGERY

## 2022-09-21 PROCEDURE — 77030003601 HC NDL NRV BLK BBMI -A

## 2022-09-21 PROCEDURE — 85610 PROTHROMBIN TIME: CPT

## 2022-09-21 PROCEDURE — 76060000035 HC ANESTHESIA 2 TO 2.5 HR: Performed by: ORTHOPAEDIC SURGERY

## 2022-09-21 PROCEDURE — 2709999900 HC NON-CHARGEABLE SUPPLY: Performed by: ORTHOPAEDIC SURGERY

## 2022-09-21 PROCEDURE — 0SRV0JZ REPLACEMENT OF RIGHT KNEE JOINT, TIBIAL SURFACE WITH SYNTHETIC SUBSTITUTE, OPEN APPROACH: ICD-10-PCS | Performed by: ORTHOPAEDIC SURGERY

## 2022-09-21 PROCEDURE — 77030040022 HC WND LAVG DISP BACTISURE ZIMM -F: Performed by: ORTHOPAEDIC SURGERY

## 2022-09-21 PROCEDURE — 87076 CULTURE ANAEROBE IDENT EACH: CPT

## 2022-09-21 PROCEDURE — 74011250636 HC RX REV CODE- 250/636: Performed by: STUDENT IN AN ORGANIZED HEALTH CARE EDUCATION/TRAINING PROGRAM

## 2022-09-21 PROCEDURE — 77030002933 HC SUT MCRYL J&J -A: Performed by: ORTHOPAEDIC SURGERY

## 2022-09-21 PROCEDURE — 77030005513 HC CATH URETH FOL11 MDII -B: Performed by: ORTHOPAEDIC SURGERY

## 2022-09-21 PROCEDURE — 74011000250 HC RX REV CODE- 250: Performed by: NURSE ANESTHETIST, CERTIFIED REGISTERED

## 2022-09-21 PROCEDURE — 76210000006 HC OR PH I REC 0.5 TO 1 HR: Performed by: ORTHOPAEDIC SURGERY

## 2022-09-21 PROCEDURE — 74011250636 HC RX REV CODE- 250/636: Performed by: NURSE ANESTHETIST, CERTIFIED REGISTERED

## 2022-09-21 PROCEDURE — 74011000250 HC RX REV CODE- 250: Performed by: ORTHOPAEDIC SURGERY

## 2022-09-21 PROCEDURE — 74011250637 HC RX REV CODE- 250/637: Performed by: ANESTHESIOLOGY

## 2022-09-21 PROCEDURE — 77030007866 HC KT SPN ANES BBMI -B: Performed by: NURSE ANESTHETIST, CERTIFIED REGISTERED

## 2022-09-21 PROCEDURE — P9045 ALBUMIN (HUMAN), 5%, 250 ML: HCPCS | Performed by: NURSE ANESTHETIST, CERTIFIED REGISTERED

## 2022-09-21 PROCEDURE — 76010000171 HC OR TIME 2 TO 2.5 HR INTENSV-TIER 1: Performed by: ORTHOPAEDIC SURGERY

## 2022-09-21 PROCEDURE — 77030010507 HC ADH SKN DERMBND J&J -B: Performed by: ORTHOPAEDIC SURGERY

## 2022-09-21 PROCEDURE — 65270000029 HC RM PRIVATE

## 2022-09-21 PROCEDURE — 74011250636 HC RX REV CODE- 250/636: Performed by: PHYSICIAN ASSISTANT

## 2022-09-21 PROCEDURE — 0SBC0ZZ EXCISION OF RIGHT KNEE JOINT, OPEN APPROACH: ICD-10-PCS | Performed by: ORTHOPAEDIC SURGERY

## 2022-09-21 PROCEDURE — 87186 SC STD MICRODIL/AGAR DIL: CPT

## 2022-09-21 PROCEDURE — 87176 TISSUE HOMOGENIZATION CULTR: CPT

## 2022-09-21 PROCEDURE — 74011250637 HC RX REV CODE- 250/637: Performed by: PHYSICIAN ASSISTANT

## 2022-09-21 PROCEDURE — 27486 REVISE/REPLACE KNEE JOINT: CPT | Performed by: ORTHOPAEDIC SURGERY

## 2022-09-21 PROCEDURE — 36415 COLL VENOUS BLD VENIPUNCTURE: CPT

## 2022-09-21 PROCEDURE — 87077 CULTURE AEROBIC IDENTIFY: CPT

## 2022-09-21 RX ORDER — POLYETHYLENE GLYCOL 3350 17 G/17G
17 POWDER, FOR SOLUTION ORAL DAILY
Status: DISCONTINUED | OUTPATIENT
Start: 2022-09-22 | End: 2022-09-30 | Stop reason: HOSPADM

## 2022-09-21 RX ORDER — MORPHINE SULFATE 2 MG/ML
2 INJECTION, SOLUTION INTRAMUSCULAR; INTRAVENOUS
Status: DISCONTINUED | OUTPATIENT
Start: 2022-09-21 | End: 2022-09-21 | Stop reason: HOSPADM

## 2022-09-21 RX ORDER — ROPIVACAINE HYDROCHLORIDE 5 MG/ML
INJECTION, SOLUTION EPIDURAL; INFILTRATION; PERINEURAL
Status: COMPLETED | OUTPATIENT
Start: 2022-09-21 | End: 2022-09-21

## 2022-09-21 RX ORDER — HYDROXYZINE HYDROCHLORIDE 10 MG/1
10 TABLET, FILM COATED ORAL
Status: DISCONTINUED | OUTPATIENT
Start: 2022-09-21 | End: 2022-09-30 | Stop reason: HOSPADM

## 2022-09-21 RX ORDER — OXYCODONE HYDROCHLORIDE 5 MG/1
2.5 TABLET ORAL
Status: DISCONTINUED | OUTPATIENT
Start: 2022-09-21 | End: 2022-09-30 | Stop reason: HOSPADM

## 2022-09-21 RX ORDER — SODIUM CHLORIDE 9 MG/ML
125 INJECTION, SOLUTION INTRAVENOUS CONTINUOUS
Status: DISPENSED | OUTPATIENT
Start: 2022-09-21 | End: 2022-09-23

## 2022-09-21 RX ORDER — ONDANSETRON 2 MG/ML
INJECTION INTRAMUSCULAR; INTRAVENOUS AS NEEDED
Status: DISCONTINUED | OUTPATIENT
Start: 2022-09-21 | End: 2022-09-21 | Stop reason: HOSPADM

## 2022-09-21 RX ORDER — MIDAZOLAM HYDROCHLORIDE 1 MG/ML
0.5 INJECTION, SOLUTION INTRAMUSCULAR; INTRAVENOUS
Status: DISCONTINUED | OUTPATIENT
Start: 2022-09-21 | End: 2022-09-21 | Stop reason: HOSPADM

## 2022-09-21 RX ORDER — MIDAZOLAM HYDROCHLORIDE 1 MG/ML
1 INJECTION, SOLUTION INTRAMUSCULAR; INTRAVENOUS AS NEEDED
Status: DISCONTINUED | OUTPATIENT
Start: 2022-09-21 | End: 2022-09-21 | Stop reason: HOSPADM

## 2022-09-21 RX ORDER — WARFARIN SODIUM 5 MG/1
5 TABLET ORAL ONCE
Status: COMPLETED | OUTPATIENT
Start: 2022-09-21 | End: 2022-09-21

## 2022-09-21 RX ORDER — SODIUM CHLORIDE 0.9 % (FLUSH) 0.9 %
5-40 SYRINGE (ML) INJECTION EVERY 8 HOURS
Status: DISCONTINUED | OUTPATIENT
Start: 2022-09-21 | End: 2022-09-21 | Stop reason: HOSPADM

## 2022-09-21 RX ORDER — ONDANSETRON 2 MG/ML
4 INJECTION INTRAMUSCULAR; INTRAVENOUS AS NEEDED
Status: DISCONTINUED | OUTPATIENT
Start: 2022-09-21 | End: 2022-09-21 | Stop reason: HOSPADM

## 2022-09-21 RX ORDER — PROPOFOL 10 MG/ML
INJECTION, EMULSION INTRAVENOUS AS NEEDED
Status: DISCONTINUED | OUTPATIENT
Start: 2022-09-21 | End: 2022-09-21 | Stop reason: HOSPADM

## 2022-09-21 RX ORDER — PROPOFOL 10 MG/ML
INJECTION, EMULSION INTRAVENOUS
Status: DISCONTINUED | OUTPATIENT
Start: 2022-09-21 | End: 2022-09-21 | Stop reason: HOSPADM

## 2022-09-21 RX ORDER — NALOXONE HYDROCHLORIDE 0.4 MG/ML
0.4 INJECTION, SOLUTION INTRAMUSCULAR; INTRAVENOUS; SUBCUTANEOUS AS NEEDED
Status: DISCONTINUED | OUTPATIENT
Start: 2022-09-21 | End: 2022-09-30 | Stop reason: HOSPADM

## 2022-09-21 RX ORDER — SODIUM CHLORIDE 0.9 % (FLUSH) 0.9 %
5-40 SYRINGE (ML) INJECTION EVERY 8 HOURS
Status: DISCONTINUED | OUTPATIENT
Start: 2022-09-21 | End: 2022-09-30 | Stop reason: HOSPADM

## 2022-09-21 RX ORDER — ONDANSETRON 2 MG/ML
4 INJECTION INTRAMUSCULAR; INTRAVENOUS
Status: ACTIVE | OUTPATIENT
Start: 2022-09-21 | End: 2022-09-22

## 2022-09-21 RX ORDER — OXYCODONE AND ACETAMINOPHEN 5; 325 MG/1; MG/1
1 TABLET ORAL AS NEEDED
Status: DISCONTINUED | OUTPATIENT
Start: 2022-09-21 | End: 2022-09-21 | Stop reason: HOSPADM

## 2022-09-21 RX ORDER — SODIUM CHLORIDE 0.9 % (FLUSH) 0.9 %
5-40 SYRINGE (ML) INJECTION AS NEEDED
Status: DISCONTINUED | OUTPATIENT
Start: 2022-09-21 | End: 2022-09-21 | Stop reason: HOSPADM

## 2022-09-21 RX ORDER — LIDOCAINE HYDROCHLORIDE 10 MG/ML
0.1 INJECTION, SOLUTION EPIDURAL; INFILTRATION; INTRACAUDAL; PERINEURAL AS NEEDED
Status: DISCONTINUED | OUTPATIENT
Start: 2022-09-21 | End: 2022-09-21 | Stop reason: HOSPADM

## 2022-09-21 RX ORDER — DIPHENHYDRAMINE HYDROCHLORIDE 50 MG/ML
12.5 INJECTION, SOLUTION INTRAMUSCULAR; INTRAVENOUS AS NEEDED
Status: DISCONTINUED | OUTPATIENT
Start: 2022-09-21 | End: 2022-09-21 | Stop reason: HOSPADM

## 2022-09-21 RX ORDER — ACETAMINOPHEN 500 MG
500 TABLET ORAL
Status: DISCONTINUED | OUTPATIENT
Start: 2022-09-21 | End: 2022-09-30 | Stop reason: HOSPADM

## 2022-09-21 RX ORDER — HYDROMORPHONE HYDROCHLORIDE 1 MG/ML
0.5 INJECTION, SOLUTION INTRAMUSCULAR; INTRAVENOUS; SUBCUTANEOUS
Status: ACTIVE | OUTPATIENT
Start: 2022-09-21 | End: 2022-09-22

## 2022-09-21 RX ORDER — FENTANYL CITRATE 50 UG/ML
25 INJECTION, SOLUTION INTRAMUSCULAR; INTRAVENOUS
Status: DISCONTINUED | OUTPATIENT
Start: 2022-09-21 | End: 2022-09-21 | Stop reason: HOSPADM

## 2022-09-21 RX ORDER — FACIAL-BODY WIPES
10 EACH TOPICAL DAILY PRN
Status: DISCONTINUED | OUTPATIENT
Start: 2022-09-23 | End: 2022-09-30 | Stop reason: HOSPADM

## 2022-09-21 RX ORDER — ATORVASTATIN CALCIUM 10 MG/1
20 TABLET, FILM COATED ORAL
Status: DISCONTINUED | OUTPATIENT
Start: 2022-09-21 | End: 2022-09-30 | Stop reason: HOSPADM

## 2022-09-21 RX ORDER — ACETAMINOPHEN 325 MG/1
650 TABLET ORAL ONCE
Status: COMPLETED | OUTPATIENT
Start: 2022-09-21 | End: 2022-09-21

## 2022-09-21 RX ORDER — CEFOXITIN 1 G/1
INJECTION, POWDER, FOR SOLUTION INTRAVENOUS AS NEEDED
Status: DISCONTINUED | OUTPATIENT
Start: 2022-09-21 | End: 2022-09-21 | Stop reason: HOSPADM

## 2022-09-21 RX ORDER — SODIUM CHLORIDE 9 MG/ML
25 INJECTION, SOLUTION INTRAVENOUS CONTINUOUS
Status: DISCONTINUED | OUTPATIENT
Start: 2022-09-21 | End: 2022-09-21 | Stop reason: HOSPADM

## 2022-09-21 RX ORDER — AMOXICILLIN 250 MG
1 CAPSULE ORAL 2 TIMES DAILY
Status: DISCONTINUED | OUTPATIENT
Start: 2022-09-21 | End: 2022-09-30 | Stop reason: HOSPADM

## 2022-09-21 RX ORDER — SODIUM CHLORIDE 0.9 % (FLUSH) 0.9 %
5-40 SYRINGE (ML) INJECTION AS NEEDED
Status: DISCONTINUED | OUTPATIENT
Start: 2022-09-21 | End: 2022-09-30 | Stop reason: HOSPADM

## 2022-09-21 RX ORDER — HYDROMORPHONE HYDROCHLORIDE 1 MG/ML
0.2 INJECTION, SOLUTION INTRAMUSCULAR; INTRAVENOUS; SUBCUTANEOUS
Status: DISCONTINUED | OUTPATIENT
Start: 2022-09-21 | End: 2022-09-21 | Stop reason: HOSPADM

## 2022-09-21 RX ORDER — DEXMEDETOMIDINE HYDROCHLORIDE 100 UG/ML
INJECTION, SOLUTION INTRAVENOUS AS NEEDED
Status: DISCONTINUED | OUTPATIENT
Start: 2022-09-21 | End: 2022-09-21 | Stop reason: HOSPADM

## 2022-09-21 RX ORDER — OXYCODONE HYDROCHLORIDE 5 MG/1
5 TABLET ORAL
Status: DISCONTINUED | OUTPATIENT
Start: 2022-09-21 | End: 2022-09-30 | Stop reason: HOSPADM

## 2022-09-21 RX ORDER — GLYCOPYRROLATE 0.2 MG/ML
INJECTION INTRAMUSCULAR; INTRAVENOUS AS NEEDED
Status: DISCONTINUED | OUTPATIENT
Start: 2022-09-21 | End: 2022-09-21 | Stop reason: HOSPADM

## 2022-09-21 RX ORDER — TRANEXAMIC ACID 100 MG/ML
INJECTION, SOLUTION INTRAVENOUS AS NEEDED
Status: DISCONTINUED | OUTPATIENT
Start: 2022-09-21 | End: 2022-09-21 | Stop reason: HOSPADM

## 2022-09-21 RX ORDER — ESMOLOL HYDROCHLORIDE 10 MG/ML
INJECTION INTRAVENOUS AS NEEDED
Status: DISCONTINUED | OUTPATIENT
Start: 2022-09-21 | End: 2022-09-21 | Stop reason: HOSPADM

## 2022-09-21 RX ORDER — KETOROLAC TROMETHAMINE 30 MG/ML
15 INJECTION, SOLUTION INTRAMUSCULAR; INTRAVENOUS EVERY 6 HOURS
Status: COMPLETED | OUTPATIENT
Start: 2022-09-21 | End: 2022-09-22

## 2022-09-21 RX ORDER — SODIUM CHLORIDE, SODIUM LACTATE, POTASSIUM CHLORIDE, CALCIUM CHLORIDE 600; 310; 30; 20 MG/100ML; MG/100ML; MG/100ML; MG/100ML
125 INJECTION, SOLUTION INTRAVENOUS CONTINUOUS
Status: DISCONTINUED | OUTPATIENT
Start: 2022-09-21 | End: 2022-09-21 | Stop reason: HOSPADM

## 2022-09-21 RX ORDER — ALBUMIN HUMAN 50 G/1000ML
SOLUTION INTRAVENOUS AS NEEDED
Status: DISCONTINUED | OUTPATIENT
Start: 2022-09-21 | End: 2022-09-21 | Stop reason: HOSPADM

## 2022-09-21 RX ORDER — FENTANYL CITRATE 50 UG/ML
50 INJECTION, SOLUTION INTRAMUSCULAR; INTRAVENOUS AS NEEDED
Status: DISCONTINUED | OUTPATIENT
Start: 2022-09-21 | End: 2022-09-21 | Stop reason: HOSPADM

## 2022-09-21 RX ADMIN — PHENYLEPHRINE HYDROCHLORIDE 40 MCG/MIN: 10 INJECTION INTRAVENOUS at 13:27

## 2022-09-21 RX ADMIN — ROPIVACAINE HYDROCHLORIDE 30 ML: 5 INJECTION, SOLUTION EPIDURAL; INFILTRATION; PERINEURAL at 13:06

## 2022-09-21 RX ADMIN — OXYCODONE 5 MG: 5 TABLET ORAL at 21:47

## 2022-09-21 RX ADMIN — DEXMEDETOMIDINE HYDROCHLORIDE 7.5 MCG: 100 INJECTION, SOLUTION, CONCENTRATE INTRAVENOUS at 13:54

## 2022-09-21 RX ADMIN — ALBUMIN (HUMAN) 250 ML: 12.5 INJECTION, SOLUTION INTRAVENOUS at 14:26

## 2022-09-21 RX ADMIN — WARFARIN SODIUM 5 MG: 5 TABLET ORAL at 21:47

## 2022-09-21 RX ADMIN — KETOROLAC TROMETHAMINE 15 MG: 30 INJECTION, SOLUTION INTRAMUSCULAR; INTRAVENOUS at 18:46

## 2022-09-21 RX ADMIN — SENNOSIDES AND DOCUSATE SODIUM 1 TABLET: 50; 8.6 TABLET ORAL at 18:46

## 2022-09-21 RX ADMIN — FENTANYL CITRATE 50 MCG: 50 INJECTION, SOLUTION INTRAMUSCULAR; INTRAVENOUS at 12:56

## 2022-09-21 RX ADMIN — ATORVASTATIN CALCIUM 20 MG: 10 TABLET, FILM COATED ORAL at 21:47

## 2022-09-21 RX ADMIN — ACETAMINOPHEN 500 MG: 325 TABLET, FILM COATED ORAL at 21:47

## 2022-09-21 RX ADMIN — PROPOFOL 20 MG: 10 INJECTION, EMULSION INTRAVENOUS at 13:27

## 2022-09-21 RX ADMIN — PROPOFOL 40 MCG/KG/MIN: 10 INJECTION, EMULSION INTRAVENOUS at 13:27

## 2022-09-21 RX ADMIN — ESMOLOL HYDROCHLORIDE 30 MG: 10 INJECTION, SOLUTION INTRAVENOUS at 14:41

## 2022-09-21 RX ADMIN — SODIUM CHLORIDE, PRESERVATIVE FREE 10 ML: 5 INJECTION INTRAVENOUS at 18:47

## 2022-09-21 RX ADMIN — ACETAMINOPHEN 500 MG: 325 TABLET, FILM COATED ORAL at 18:46

## 2022-09-21 RX ADMIN — SODIUM CHLORIDE 125 ML/HR: 9 INJECTION, SOLUTION INTRAVENOUS at 16:00

## 2022-09-21 RX ADMIN — GLYCOPYRROLATE 0.2 MG: 0.2 INJECTION INTRAMUSCULAR; INTRAVENOUS at 13:33

## 2022-09-21 RX ADMIN — ACETAMINOPHEN 650 MG: 325 TABLET, FILM COATED ORAL at 11:20

## 2022-09-21 RX ADMIN — SODIUM CHLORIDE, PRESERVATIVE FREE 10 ML: 5 INJECTION INTRAVENOUS at 21:51

## 2022-09-21 RX ADMIN — PROPOFOL 40 MG: 10 INJECTION, EMULSION INTRAVENOUS at 13:13

## 2022-09-21 RX ADMIN — CEFOXITIN SODIUM 2 G: 1 POWDER, FOR SOLUTION INTRAVENOUS at 13:57

## 2022-09-21 RX ADMIN — SODIUM CHLORIDE, POTASSIUM CHLORIDE, SODIUM LACTATE AND CALCIUM CHLORIDE 125 ML/HR: 600; 310; 30; 20 INJECTION, SOLUTION INTRAVENOUS at 11:42

## 2022-09-21 RX ADMIN — DEXMEDETOMIDINE HYDROCHLORIDE 7.5 MCG: 100 INJECTION, SOLUTION, CONCENTRATE INTRAVENOUS at 13:30

## 2022-09-21 RX ADMIN — DEXMEDETOMIDINE HYDROCHLORIDE 5 MCG: 100 INJECTION, SOLUTION, CONCENTRATE INTRAVENOUS at 14:40

## 2022-09-21 RX ADMIN — MEPIVACAINE HYDROCHLORIDE 45 MG: 15 INJECTION, SOLUTION EPIDURAL; INFILTRATION at 13:24

## 2022-09-21 RX ADMIN — ONDANSETRON HYDROCHLORIDE 4 MG: 2 INJECTION, SOLUTION INTRAMUSCULAR; INTRAVENOUS at 15:12

## 2022-09-21 NOTE — ROUTINE PROCESS
Patient: Jihan Polanco MRN: [id number]  SSN: [id number]   YOB: 1928  Age: 80 y.o. Sex: male     Patient is status post Procedure(s):  RIGHT KNEE INCISION AND DRAINAGE WITH POLYETHYLENE EXCHANGE (SPINAL/IV SED). Surgeon(s) and Role:     Jose Angel Corral MD - Primary    Local/Dose/Irrigation:  see [facility]                  Peripheral IV 09/21/22 Anterior;Proximal;Right Forearm (Active)   Site Assessment Clean, dry, & intact 09/21/22 1142   Phlebitis Assessment 0 09/21/22 1142   Infiltration Assessment 0 09/21/22 1142   Dressing Status Clean, dry, & intact 09/21/22 1142   Dressing Type Transparent;Tape 09/21/22 1142   Hub Color/Line Status Pink; Infusing 09/21/22 1142                           Dressing/Packing:  Incision 09/21/22 Knee Right-Dressing/Treatment: Other (Comment) (dermabond, aquacel, cast padding, ace wrap) (09/21/22 1452)

## 2022-09-21 NOTE — PERIOP NOTES
TRANSFER - OUT REPORT:    Verbal report given to *** on Jhon  being transferred to room 574 for routine post - op       Report consisted of patients Situation, Background, Assessment and   Recommendations(SBAR). Time Pre op antibiotic given:  7925  Anesthesia Stop time:  7195  Webb Present on Transfer to floor: YES  Order for Webb on Chart: YES,    Information from the following report(s) SBAR and MAR was reviewed with the receiving nurse. Opportunity for questions and clarification was provided. Is the patient on 02? YES       L/Min 2       Other     Is the patient on a monitor? NO    Is the nurse transporting with the patient? NO    Surgical Waiting Area notified of patient's transfer from PACU? Spoke with daughter on phone and updated her. Lines:   Peripheral IV 09/21/22 Anterior;Proximal;Right Forearm (Active)   Site Assessment Clean, dry, & intact 09/21/22 1530   Phlebitis Assessment 0 09/21/22 1600   Infiltration Assessment 0 09/21/22 1600   Dressing Status Clean, dry, & intact 09/21/22 1530   Dressing Type Transparent 09/21/22 1530   Hub Color/Line Status Infusing 09/21/22 1600        The following personal items collected during your admission accompanied patient upon transfer:   Dental Appliance: Dental Appliances: Uppers, Lowers (denutres to pacu)  Vision:    Hearing Aid: Hearing Aid: Bilateral (hearing aids to pacu)  Jewelry: Jewelry: None  Clothing: Clothing: Footwear, Pants, Shirt, Belt (clothing to [employer])  Other Valuables: Other Valuables: Other (comment) (hearing aids to pacu)  Valuables sent to safe:        VS stable. Awake and alert. Resting comfortably. Patient denies nausea. Pain improved. No signs of excessive bleeding. Ready to transfer from PACU.

## 2022-09-21 NOTE — ANESTHESIA POSTPROCEDURE EVALUATION
Procedure(s):  RIGHT KNEE INCISION AND DRAINAGE WITH POLYETHYLENE EXCHANGE (SPINAL/IV SED). spinal    Anesthesia Post Evaluation        Patient participation: complete - patient participated  Level of consciousness: awake  Pain management: adequate  Airway patency: patent  Anesthetic complications: no  Cardiovascular status: hemodynamically stable  Respiratory status: acceptable  Hydration status: acceptable  Comments: The patient is ready for PACU discharge. Deborah Hartmann DO                   Post anesthesia nausea and vomiting:  controlled      INITIAL Post-op Vital signs:   Vitals Value Taken Time   BP 92/65 09/21/22 1545   Temp 36.4 °C (97.6 °F) 09/21/22 1523   Pulse 75 09/21/22 1556   Resp 16 09/21/22 1556   SpO2 100 % 09/21/22 1556   Vitals shown include unvalidated device data.

## 2022-09-21 NOTE — OP NOTES
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 POSTOPERATIVE DIAGNOSES:  1. 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 2.  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 PROCEDURES PERFORMED:  Arthrotomy of right knee with irrigation and debridement, exchange of modular polyethylene insert. SURGEON:  Ga Stoll MD    ASSISTANT:  Miriam Cooney PA-C    ANESTHESIA:  Spinal with sedation as well as adductor canal block. COMPLICATIONS:  None. SPECIMENS REMOVED:  Culture x3. IMPLANTS:  Leticia 22 mm CCK polyethylene insert for a size F tibial tray. ESTIMATED BLOOD LOSS:  200 mL. INDICATIONS:  The patient is a 80-year-old gentleman who underwent revision right total knee replacement just over a year ago for catastrophic failure of bearing surface, significant instability. He initially did well, but when he presented around the time of his 1-year followup visit, he was complaining of pain and swelling for at least 3-4 months previous to that. Sedimentation rate and C-reactive protein were elevated, and deep periprosthetic infection was identified and confirmed with multiple different aspiration samples. .  We had a very lengthy discussion regarding treatment options, with both the patient and his family. Options included irrigation and debridement with polyethylene exchange followed by IV antibiotic therapy and chronic suppression, versus formal 2-stage revision. At his age, 2-stage revision is not going to be well tolerated. .  We will agree that debridement with polyethylene exchange and IV therapy followed by chronic suppression would probably suit him best.  They understand the risk for treatment failure and worsening infection, with possible need for future surgery. They also understand increased risk for VTE due to the patient's multiple VTE events in the past.    PROCEDURE:  Anesthesia team placed an adductor canal block in the right thigh before taking the patient to the operating room and they also placed a spinal.  Preoperative IV antibiotics were held until intraoperative cultures were obtained. Padded pneumatic tourniquet was placed around the right upper thigh. Right lower extremity was prepped and draped in usual sterile fashion. Tourniquet was inflated to 275, without exsanguination. I utilized existing longitudinal anterior scar to perform a medial parapatellar arthrotomy. Joint fluid was slightly turbid. Two fluid samples and a synovial tissue sample sent for culture. Progressive medial release was performed to facilitate exposure and soft tissue balance throughout the procedure. There was diffusely thickened synovium consistent with chronic infection; however, the synovium was not particularly inflammatory appearing. Total anterior synovectomy was performed and there was a very large affected joint space with a large cavity in the suprapatellar region. There was some soft tissue laxity in both extension and flexion. The existing 20 mm CCK polyethylene insert was removed. Posterior synovectomy was performed. The wound was irrigated with approximately 2 liters of saline by pulse lavage, followed by 1 liter of Bactisure and subsequently another liter of saline. With a 22 mm trial insert in place, there was satisfactory soft tissue tension in both flexion and extension. The 22 mm CCK insert was snapped into place. Tourniquet was released and hemostasis obtained with the Bovie. Periarticular soft tissues were injected with a solution containing 0.5% ropivacaine with epinephrine as well as clonidine and Toradol. 2 g of topical tranexamic acid was applied to the wound.   The arthrotomy was closed with a combination of heavy interrupted PDS suture well as a running #2 PDS Stratafix suture. Skin and subcutaneous layers were closed in layered fashion with PDS and a running Monocryl subcuticular stitch. The wound was dressed with Dermabond and Aquacel occlusive dressing as well as sterile compressive dressing. Note that Webb catheter had been inserted at the beginning of the procedure. The patient was transported to postanesthesia care unit in stable condition. All counts were correct at the end of the procedure. The physician assistant was critical throughout the procedure to assist with patient positioning, retraction, and closure.   There were no [facility] residents or fellows available to Breanna Dyer MD      [id number]  D:  09/21/2022 15:15  T:  09/21/2022 18:37  JOB #:  [id number]  CC:  MD Baljit Ugalde MD

## 2022-09-21 NOTE — ANESTHESIA PROCEDURE NOTES
Peripheral Block    Start time: 9/21/2022 1:04 PM  End time: 9/21/2022 1:06 PM  Performed by: Martina Villa MD  Authorized by: Martina Villa MD       Pre-procedure: Indications: at surgeon's request and post-op pain management    Preanesthetic Checklist: patient identified, risks and benefits discussed, site marked, timeout performed and patient being monitored    Timeout Time: 13:04 EDT      Block Type:   Block Type:   Adductor canal  Laterality:  Right  Monitoring:  Standard ASA monitoring, continuous pulse ox, frequent vital sign checks, heart rate, responsive to questions and oxygen  Injection Technique:  Single shot  Procedures: ultrasound guided    Patient Position: supine  Prep: chlorhexidine    Location:  Mid thigh  Needle Type:  Stimuplex  Needle Gauge:  21 G  Needle Localization:  Ultrasound guidance and anatomical landmarks  Medication Injected:  Ropivacaine (PF) (NAROPIN)(0.5%) 5 mg/mL injection - Peripheral Nerve Block   30 mL - 9/21/2022 1:06:00 PM  Med Admin Time: 9/21/2022 1:04 PM    Assessment:  Number of attempts:  1  Injection Assessment:  Incremental injection every 5 mL, local visualized surrounding nerve on ultrasound, negative aspiration for blood, no paresthesia, no intravascular symptoms and ultrasound image on chart  Patient tolerance:  Patient tolerated the procedure well with no immediate complications

## 2022-09-21 NOTE — PROGRESS NOTES
Problem: Falls - Risk of  Goal: *Absence of Falls  Description: Document Tg Medel Fall Risk and appropriate interventions in the flowsheet. Outcome: Progressing Towards Goal  Note: Fall Risk Interventions:  Mobility Interventions: Patient to call before getting OOB              Elimination Interventions: Call light in reach    History of Falls Interventions: Door open when patient unattended         Problem: Pressure Injury - Risk of  Goal: *Prevention of pressure injury  Description: Document Jose Scale and appropriate interventions in the flowsheet.   Outcome: Progressing Towards Goal  Note: Pressure Injury Interventions:  Sensory Interventions: Assess changes in LOC         Activity Interventions: Increase time out of bed, Pressure redistribution bed/mattress(bed type), PT/OT evaluation    Mobility Interventions: HOB 30 degrees or less, Pressure redistribution bed/mattress (bed type)    Nutrition Interventions: Document food/fluid/supplement intake    Friction and Shear Interventions: HOB 30 degrees or less

## 2022-09-21 NOTE — ANESTHESIA PREPROCEDURE EVALUATION
Relevant Problems   No relevant active problems       Anesthetic History   No history of anesthetic complications            Review of Systems / Medical History  Patient summary reviewed, nursing notes reviewed and pertinent labs reviewed    Pulmonary  Within defined limits                 Neuro/Psych   Within defined limits           Cardiovascular            Dysrhythmias : atrial fibrillation  Hyperlipidemia    Exercise tolerance: >4 METS  Comments: EF 50 on last TTE, no significant valvular abnormalities   GI/Hepatic/Renal  Within defined limits              Endo/Other        Arthritis     Other Findings   Comments: INR 1.8 on POC          Physical Exam    Airway  Mallampati: II  TM Distance: 4 - 6 cm  Neck ROM: normal range of motion   Mouth opening: Normal     Cardiovascular    Rhythm: regular  Rate: normal         Dental    Dentition: Full lower dentures and Full upper dentures     Pulmonary  Breath sounds clear to auscultation               Abdominal  GI exam deferred       Other Findings            Anesthetic Plan    ASA: 3  Anesthesia type: general      Post-op pain plan if not by surgeon: peripheral nerve block single      Anesthetic plan and risks discussed with: Patient

## 2022-09-21 NOTE — CONSULTS
Infectious Disease Consult    Today's Date: 9/21/2022   Admit Date: 9/21/2022    Impression:   Subacute infection right TKR  Status post exploration in OR  Strep salivarius from studies done in outpatient setting  Will need IV antibiotic therapy followed by chronic suppressive antibiotic therapy    Plan:   Follow up cultures  IV antibiotic therapy--begin ceftriaxone once perioperative antibiotics are done  Will need PICC, etc    Anti-infectives:   Ceftriaxone     Subjective:   Date of Consultation:  September 21, 2022  Referring Physician: Dr York [facility]    Patient is a 80 y.o. male admitted for exploration of right TKR. He underwent revision of an old right TKR on 4/20/21 and was doing well. In the spring of this year he suffered a fall, abrading his knees, arms and face. He has had problems to some degree with his right knee ever since. He has had synovial studies done in the outpatient setting and S. salivarius has been found in the fluid. Organism is sensitive to PCN. He has been to the OR for poly exchange and we are asked to see him in consultation.       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 ([facility]) T84.062A    Instability of internal right knee prosthesis ([address] T84.022A    History of total knee replacement, right Z96.651    Infection of total knee replacement, initial encounter ([address] T84.59XA, Z96.659     Past Medical History:   Diagnosis Date    Arthritis     DJD    Cancer ([address]     prostate    Cancer ([address]     SKIN CA RIGHT EYEBROW AREA    Chronic pain     History of colonoscopy with polypectomy 2005~    Hypercholesterolemia     Macular degeneration     Other ill-defined conditions(799.89)     cataracts    PAF (paroxysmal atrial fibrillation) ([facility])     Thromboembolus ([facility])     3 x left leg, 1 x right leg 1990'S      Family History   Problem Relation Age of Onset    Cancer Mother         STOMACH    Cancer Father         PROSTATE    Heart Disease Brother         MI    Anesth Problems Neg Hx       Social History     Tobacco Use    Smoking status: Former     Types: Cigars     Quit date: 2012     Years since quitting: 10.7    Smokeless tobacco: Never    Tobacco comments:     quit 15 yrs ago   Substance Use Topics    Alcohol use: Not Currently     Past Surgical History:   Procedure Laterality Date    HX CATARACT REMOVAL Bilateral     HX KNEE REPLACEMENT Left     HX MOHS PROCEDURES  1995    right    HX OTHER SURGICAL  1996    [place] filter insertion    HX PROSTATECTOMY  age 72    HX ROTATOR CUFF REPAIR Right     HX TONSILLECTOMY      CT LIGMT REVISION,KNEE,EXTRA-ARTIC Right 04/2021    CT TOTAL HIP ARTHROPLASTY      left    CT TOTAL KNEE ARTHROPLASTY      right    VASCULAR SURGERY PROCEDURE UNLIST      1996 IVC FILTER      Prior to Admission medications    Medication Sig Start Date End Date Taking? Authorizing Provider   acetaminophen (TYLENOL) 500 mg tablet Take 1 Tab by mouth every four (4) hours. Patient taking differently: Take 1,000 mg by mouth every four (4) hours. 4/21/21  Yes Vy Nguyễn PA-C   furosemide (LASIX) 40 mg tablet Take 40 mg by mouth daily. Yes Provider, Historical   furosemide (LASIX) 20 mg tablet Take  by mouth nightly. Yes Provider, Historical   atorvastatin (LIPITOR) 20 mg tablet Take 1 Tab by mouth daily. Indications: high cholesterol  Patient taking differently: Take 20 mg by mouth nightly. Indications: high cholesterol 2/17/20  Yes Michell Lamas MD   vit C/E/Zn/coppr/lutein/zeaxan (PRESERVISION AREDS-2 PO) Take 1 Tablet by mouth two (2) times a day. Yes Nakul, MD Clement   latanoprost (XALATAN) 0.005 % ophthalmic solution Administer 1 Drop to both eyes nightly. Yes Nakul, MD Clement   cholecalciferol (VITAMIN D3) (1000 Units /25 mcg) tablet Take 1,000 Units by mouth daily. Yes Nakul, MD Clement   warfarin (COUMADIN) 2.5 mg tablet Take 5 mg by mouth daily.  Indications: TUES AND THURS    Provider, Historical   celecoxib (CELEBREX) 200 mg capsule Take 200 mg by mouth daily. Provider, Historical       Allergies   Allergen Reactions    Tape [Adhesive] Other (comments)     REDNESS, SKIN TEARS - BANDAID, PAPER TAPE        Review of Systems:  A comprehensive review of systems was negative except for that written in the History of Present Illness. Objective:     Visit Vitals  BP 95/65 (BP 1 Location: Left arm, BP Patient Position: At rest;Supine;Reclining)   Pulse 75   Temp 97.6 °F (36.4 °C)   Resp 18   Ht 5' 8\" (1.727 m)   Wt 85.3 kg (188 lb)   SpO2 96%   BMI 28.59 kg/m²     Temp (24hrs), Av.7 °F (36.5 °C), Min:97.6 °F (36.4 °C), Max:97.8 °F (36.6 °C)       Lines:  Peripheral IV:       Physical Exam:  Lungs:  clear to auscultation bilaterally  Heart:  regular rate and rhythm  Abdomen:  soft, non-tender. Bowel sounds normal. No masses,  no organomegaly  Skin:  no rash or abnormalities  Wound dressed and dry    Data Review:     CBC:  No results for input(s): WBC, GRANS, MONOS, EOS, ANEU, ABL, HGB, HCT, PLT, HGBEXT, HCTEXT, PLTEXT in the last 72 hours. No lab exists for component: LYMPHS,  SAMANTHA    BMP:  No results for input(s): CREA, BUN, NA, K, CL, CO2, AGAP, GLU in the last 72 hours. LFTS:  No results for input(s): TBILI, ALT, AP, TP, ALB in the last 72 hours.     No lab exists for component: SGOT    Microbiology:     All Micro Results       Procedure Component Value Units Date/Time    CULTURE, ANAEROBIC [[id number]] Collected: 22    Order Status: No result Updated: 22    CULTURE, Brown An STAIN [[id number]] Collected: 22    Order Status: No result Updated: 22    CULTURE, ANAEROBIC [[id number]] Collected: 22    Order Status: No result Updated: 22    CULTURE, Brown An STAIN [[id number]] Collected: 09/21/22 1408    Order Status: No result Updated: 22 1507    CULTURE, ANAEROBIC [[id number]] Collected: 09/21/22 1407    Order Status: No result Updated: 09/21/22 1504    CULTURE, [place] STAIN [[id number]] Collected: 09/21/22 1407    Order Status: No result Updated: 09/21/22 1504            Imaging:   None     Signed By: Mariela Haji MD     September 21, 2022

## 2022-09-21 NOTE — PROGRESS NOTES
Pharmacist Note - Warfarin Dosing  Consult provided for this 89 y.o.male to manage warfarin for h/o DVT/afib, now s/p orthopedic surgery    INR Goal: 2 - 3    Risk factors: Age > 65  Daily INR ordered: YES    Recent Labs     09/21/22  1207 09/21/22  1156   INR 1.2* 1.8*                                   Assessment/ Plan: Will order warfarin 5 mg PO x 1 dose. Pharmacy will continue to monitor daily and adjust therapy as indicated. Please contact the pharmacist at  for outpatient recommendations if needed.

## 2022-09-21 NOTE — ANESTHESIA PROCEDURE NOTES
Spinal Block    Start time: 9/21/2022 1:14 PM  End time: 9/21/2022 1:25 PM  Performed by: Imedla Phipps CRNA  Authorized by: Imelda Phipps CRNA     Pre-procedure:   Indications: primary anesthetic  Preanesthetic Checklist: patient identified, risks and benefits discussed, anesthesia consent, site marked, patient being monitored, timeout performed and fire risk safety assessment completed and verbalized    Timeout Time: 13:13 EDT      Spinal Block:   Patient Position:  Seated  Prep Region:  Lumbar      Location:  L3-4  Technique:  Single shot  Local: mepivacaine-pf (CARBOCAINE-PF) 1.5% PF injection - Other   45 mg - 9/21/2022 1:24:00 PM  Local Dose (mL):  3  Med Admin Time: 9/21/2022 1:24 PM    Needle:   Needle Type:  Pencil-tip  Needle Gauge:  24 G  Attempts:  1      Events: CSF confirmed, no blood with aspiration and no paresthesia        Assessment:  Insertion:  Uncomplicated  Patient tolerance:  Patient tolerated the procedure well with no immediate complications

## 2022-09-22 LAB
ANION GAP SERPL CALC-SCNC: 4 MMOL/L (ref 5–15)
BACTERIA SPEC CULT: NORMAL
BACTERIA SPEC CULT: NORMAL
BUN SERPL-MCNC: 27 MG/DL (ref 6–20)
BUN/CREAT SERPL: 21 (ref 12–20)
CALCIUM SERPL-MCNC: 8.4 MG/DL (ref 8.5–10.1)
CHLORIDE SERPL-SCNC: 110 MMOL/L (ref 97–108)
CO2 SERPL-SCNC: 28 MMOL/L (ref 21–32)
CREAT SERPL-MCNC: 1.28 MG/DL (ref 0.7–1.3)
GLUCOSE SERPL-MCNC: 89 MG/DL (ref 65–100)
HGB BLD-MCNC: 9.3 G/DL (ref 12.1–17)
INR PPP: 1.3 (ref 0.9–1.1)
POTASSIUM SERPL-SCNC: 4.4 MMOL/L (ref 3.5–5.1)
PROTHROMBIN TIME: 13.1 SEC (ref 9–11.1)
SERVICE CMNT-IMP: NORMAL
SERVICE CMNT-IMP: NORMAL
SODIUM SERPL-SCNC: 142 MMOL/L (ref 136–145)

## 2022-09-22 PROCEDURE — 74011000250 HC RX REV CODE- 250: Performed by: PHYSICIAN ASSISTANT

## 2022-09-22 PROCEDURE — 97161 PT EVAL LOW COMPLEX 20 MIN: CPT

## 2022-09-22 PROCEDURE — 74011250636 HC RX REV CODE- 250/636: Performed by: PHYSICIAN ASSISTANT

## 2022-09-22 PROCEDURE — 65270000029 HC RM PRIVATE

## 2022-09-22 PROCEDURE — 85018 HEMOGLOBIN: CPT

## 2022-09-22 PROCEDURE — 85610 PROTHROMBIN TIME: CPT

## 2022-09-22 PROCEDURE — 97530 THERAPEUTIC ACTIVITIES: CPT

## 2022-09-22 PROCEDURE — 97110 THERAPEUTIC EXERCISES: CPT

## 2022-09-22 PROCEDURE — 80048 BASIC METABOLIC PNL TOTAL CA: CPT

## 2022-09-22 PROCEDURE — 51798 US URINE CAPACITY MEASURE: CPT

## 2022-09-22 PROCEDURE — 74011250637 HC RX REV CODE- 250/637: Performed by: PHYSICIAN ASSISTANT

## 2022-09-22 PROCEDURE — 74011250636 HC RX REV CODE- 250/636

## 2022-09-22 PROCEDURE — 36415 COLL VENOUS BLD VENIPUNCTURE: CPT

## 2022-09-22 RX ORDER — WARFARIN SODIUM 5 MG/1
5 TABLET ORAL EVERY EVENING
COMMUNITY

## 2022-09-22 RX ORDER — WARFARIN SODIUM 5 MG/1
5 TABLET ORAL
Status: COMPLETED | OUTPATIENT
Start: 2022-09-22 | End: 2022-09-22

## 2022-09-22 RX ADMIN — SENNOSIDES AND DOCUSATE SODIUM 1 TABLET: 50; 8.6 TABLET ORAL at 10:07

## 2022-09-22 RX ADMIN — ATORVASTATIN CALCIUM 20 MG: 10 TABLET, FILM COATED ORAL at 22:00

## 2022-09-22 RX ADMIN — ACETAMINOPHEN 500 MG: 325 TABLET, FILM COATED ORAL at 22:00

## 2022-09-22 RX ADMIN — CEFTRIAXONE SODIUM 2 G: 2 INJECTION, POWDER, FOR SOLUTION INTRAMUSCULAR; INTRAVENOUS at 00:27

## 2022-09-22 RX ADMIN — KETOROLAC TROMETHAMINE 15 MG: 30 INJECTION, SOLUTION INTRAMUSCULAR; INTRAVENOUS at 13:17

## 2022-09-22 RX ADMIN — SODIUM CHLORIDE, PRESERVATIVE FREE 10 ML: 5 INJECTION INTRAVENOUS at 22:01

## 2022-09-22 RX ADMIN — KETOROLAC TROMETHAMINE 15 MG: 30 INJECTION, SOLUTION INTRAMUSCULAR; INTRAVENOUS at 00:32

## 2022-09-22 RX ADMIN — WARFARIN SODIUM 5 MG: 5 TABLET ORAL at 19:07

## 2022-09-22 RX ADMIN — ACETAMINOPHEN 500 MG: 325 TABLET, FILM COATED ORAL at 06:19

## 2022-09-22 RX ADMIN — SODIUM CHLORIDE, PRESERVATIVE FREE 10 ML: 5 INJECTION INTRAVENOUS at 15:52

## 2022-09-22 RX ADMIN — SODIUM CHLORIDE, PRESERVATIVE FREE 10 ML: 5 INJECTION INTRAVENOUS at 06:20

## 2022-09-22 RX ADMIN — ACETAMINOPHEN 500 MG: 325 TABLET, FILM COATED ORAL at 10:08

## 2022-09-22 RX ADMIN — KETOROLAC TROMETHAMINE 15 MG: 30 INJECTION, SOLUTION INTRAMUSCULAR; INTRAVENOUS at 06:20

## 2022-09-22 RX ADMIN — SODIUM CHLORIDE 500 ML: 900 INJECTION, SOLUTION INTRAVENOUS at 14:00

## 2022-09-22 RX ADMIN — ACETAMINOPHEN 500 MG: 325 TABLET, FILM COATED ORAL at 15:52

## 2022-09-22 RX ADMIN — POLYETHYLENE GLYCOL 3350 17 G: 17 POWDER, FOR SOLUTION ORAL at 10:08

## 2022-09-22 NOTE — PROGRESS NOTES
ID Progress Note  2022    Subjective:     Not feeling all that good right now. Having some knee pain    Objective:     Antibiotics:  Ceftriaxone       Vitals: Visit Vitals  /67 (BP 1 Location: Left upper arm)   Pulse 85   Temp 97.7 °F (36.5 °C)   Resp 16   Ht 5' 8\" (1.727 m)   Wt 85.3 kg (188 lb)   SpO2 97%   BMI 28.59 kg/m²        Tmax:  Temp (24hrs), Av.7 °F (36.5 °C), Min:97.4 °F (36.3 °C), Max:98.2 °F (36.8 °C)      Exam:  Lungs:  clear to auscultation bilaterally  Heart:  regular rate and rhythm  Abdomen:  soft, non-tender. Bowel sounds normal. No masses,  no organomegaly  Wound dressed and dry    Labs:      Recent Labs     22  0258   HGB 9.3*   BUN 27*   CREA 1.28       Cultures:     Lab Results   Component Value Date/Time    Specimen Description: [facility] 2011 04:10 PM    Specimen Description: ADAM 2011 03:52 PM     Lab Results   Component Value Date/Time    Culture result: No growth thus far, holding 14 days. 2022 02:08 PM    Culture result: PENDING 2022 02:08 PM    Culture result: No growth thus far, holding 14 days.  2022 02:08 PM       Radiology:     Line/Insert Date:           Assessment:     Chronic right TKR infection--cultures negative, but grew strep in the outpatient setting  Debility   S/P OR    Objective:     Continue current therapy  Follow up cultures and studies  Plan for long-term antibiotic therapy    Viraj Wilson MD

## 2022-09-22 NOTE — PROGRESS NOTES
Problem: Mobility Impaired (Adult and Pediatric)  Goal: *Acute Goals and Plan of Care (Insert Text)  Description: FUNCTIONAL STATUS PRIOR TO ADMISSION: Patient was independent and active without use of DME.    HOME SUPPORT PRIOR TO ADMISSION: The patient lived alone with daughter to provide intermittent assistance. Patient was independent with ADLs, daughter traveled 2x/week to assist with IADLs and check on him. Physical Therapy Goals  Initiated 9/22/2022    1. Patient will move from supine to sit and sit to supine , scoot up and down, and roll side to side in bed with modified independence within 4 days. 2. Patient will perform sit to stand with modified independence within 4 days. 3. Patient will ambulate with modified independence for 150 feet with the least restrictive device within 4 days. 4. Patient will ascend/descend 4 stairs with cane and one handrail(s) with modified independence within 4 days. 5. Patient will perform home exercise program per protocol with independence within 4 days. 6. Patient will demonstrate AROM 0-90 degrees in operative joint within 4 days. Outcome: Progressing Towards Goal   PHYSICAL THERAPY EVALUATION  Patient: Dante Smith (20 y.o. male)  Date: 9/22/2022  Primary Diagnosis: Infection of total knee replacement, initial encounter ([facility] [T84.59XA, Z96.659]  Procedure(s) (LRB):  RIGHT KNEE INCISION AND DRAINAGE WITH POLYETHYLENE EXCHANGE (SPINAL/IV SED) (Right) 1 Day Post-Op   Precautions:   Fall, WBAT    ASSESSMENT  Based on the objective data described below, the patient presents with  impairment in functional mobility, activity tolerance and balance s/p R knee I & D with polyethylene exchange. PLOF: Independent with ADLs. Patient lives alone in [place] but daughter visits 2 days/week to assist with IADLs and check on him. If patient moves well and can go home, he will be staying in [place] at daughter's house, 4 steps with rail to enter, one story.     Patient has been complaining of dizziness, has been orthostatic and just finished a bolus of fluid. He performed bed mobility, sit-stand, took a few steps with RW, but returned to bed. Although BP is stable, he is still complaining of dizziness. Will attempt ambulation tomorrow. Performed post-TKA exercises with cues. Current Level of Function Impacting Discharge (mobility/balance): Minimal assistance for bed mobility/contact guard for transfers. Functional Outcome Measure: The patient scored 40/100 on the Barthel outcome measure which is indicative of moderate amimpaired ability to care for basic self-needs/dependency on others. .      Other factors to consider for discharge: Lives alone/Motivated/A & O x 4/Supportive Family/Independent PLOF/will be having IV abx x 6 weeks. Patient will benefit from skilled therapy intervention to address the above noted impairments. PLAN :  Recommendations and Planned Interventions: bed mobility training, transfer training, gait training, therapeutic exercises, patient and family training/education, and therapeutic activities      Frequency/Duration: Patient will be followed by physical therapy:  5 times a week to address goals. Recommendation for discharge: (in order for the patient to meet his/her long term goals)  Physical therapy at least 2 days/week in the home AND ensure assist and/or supervision for safety with all ADLs and mobility vs SNF for rehab and IV abx. This discharge recommendation:  A follow-up discussion with the attending provider and/or case management is planned    IF patient discharges home will need the following DME: patient owns DME required for discharge         SUBJECTIVE:   Patient stated I have been really dizzy.     OBJECTIVE DATA SUMMARY:   HISTORY:    Past Medical History:   Diagnosis Date    Arthritis     DJD    Cancer ([facility]     prostate    Cancer ([facility]     SKIN CA RIGHT EYEBROW AREA    Chronic pain     History of colonoscopy with polypectomy 2005~    Hypercholesterolemia     Macular degeneration     Other ill-defined conditions(799.89)     cataracts    PAF (paroxysmal atrial fibrillation) (HCC)     Thromboembolus (HCC)     3 x left leg, 1 x right leg 1990'S     Past Surgical History:   Procedure Laterality Date    HX CATARACT REMOVAL Bilateral     HX KNEE REPLACEMENT Left     HX MOHS PROCEDURES  1995    right    HX OTHER SURGICAL  1996    [place] filter insertion    HX PROSTATECTOMY  age 72    HX ROTATOR CUFF REPAIR Right     HX TONSILLECTOMY      RI LIGMT REVISION,KNEE,EXTRA-ARTIC Right 04/2021    RI TOTAL HIP ARTHROPLASTY      left    RI TOTAL KNEE ARTHROPLASTY      right    VASCULAR SURGERY PROCEDURE UNLIST      1996 IVC FILTER       Personal factors and/or comorbidities impacting plan of care: Lives alone/Motivated/A & O x 4/Supportive Family/Independent PLOF/will be having IV abx x 6 weeks. Home Situation  Home Environment: Private residence  # Steps to Enter: 3  Rails to Enter: Yes (but wobbly)  Hand Rails : Right  Wheelchair Ramp: No  One/Two Story Residence: One story  Living Alone: No  Support Systems: Child(kita) (will be staying with adult)  Patient Expects to be Discharged to[de-identified] Home with home health (vs SNF)  Current DME Used/Available at Home: Cane, straight, Walker, rolling  Tub or Shower Type: Shower    EXAMINATION/PRESENTATION/DECISION MAKING:   Critical Behavior:  Neurologic State: Alert  Orientation Level: Oriented X4  Cognition: Appropriate decision making     Hearing: Auditory  Auditory Impairment: Hard of hearing, bilateral  Hearing Aids/Status: With patient  Skin:  Ace Wrap Intact with no drainage appreciated.    Edema: AMOSNormal post-op inflammation   Range Of Motion:  AROM: Generally decreased, functional           PROM: Generally decreased, functional           Strength:    Strength: Generally decreased, functional                    Tone & Sensation:   Tone: Normal              Sensation: Intact Coordination:  Coordination: Within functional limits  Vision:      Functional Mobility:  Bed Mobility:     Supine to Sit: Minimum assistance; Additional time; Adaptive equipment (assist to sit up/use of bed rail)  Sit to Supine: Minimum assistance (to manage LEs)  Scooting: Minimum assistance  Transfers:  Sit to Stand: Contact guard assistance  Stand to Sit: Contact guard assistance                       Balance:   Sitting: Intact  Standing: Impaired; Without support  Standing - Static: Good;Constant support  Standing - Dynamic : Fair;Constant support  Ambulation/Gait Training:                    Right Side Weight Bearing: As tolerated                                          Therapeutic Exercises: Ankle Pumps  Ham Sets  Quad Sets  Heel Slides  X 10 each, 5-7x daily      Functional Measure:  Barthel Index:    Bathin  Bladder: 0 (has a Webb)  Bowels: 10  Groomin  Dressin  Feeding: 10  Mobility: 0  Stairs: 0  Toilet Use: 5  Transfer (Bed to Chair and Back): 10  Total: 40/100       The Barthel ADL Index: Guidelines  1. The index should be used as a record of what a patient does, not as a record of what a patient could do. 2. The main aim is to establish degree of independence from any help, physical or verbal, however minor and for whatever reason. 3. The need for supervision renders the patient not independent. 4. A patient's performance should be established using the best available evidence. Asking the patient, friends/relatives and nurses are the usual sources, but direct observation and common sense are also important. However direct testing is not needed. 5. Usually the patient's performance over the preceding 24-48 hours is important, but occasionally longer periods will be relevant. 6. Middle categories imply that the patient supplies over 50 per cent of the effort. 7. Use of aids to be independent is allowed.     Score Interpretation (from [address] [facility])    Independent   60-79 Minimally independent   40-59 Partially dependent   20-39 Very dependent   <20 Totally dependent     -Hannah Napier, Barthel, D.W. (9089). Functional evaluation: the Barthel Index. [address] ([address] (1997). The Barthel activities of daily living index: self-reporting versus actual performance in the old (> or = 75 years). Journal of [address] [place] [address] 45(7), [facility], SERGEY, Debi Flores., Janet Muñoz. (1999). Measuring the change in disability after inpatient rehabilitation; comparison of the responsiveness of the Barthel Index and Functional [place] Measure. Journal of Neurology, Neurosurgery, and Psychiatry, 66(4), 660-565. WOLF Perez, ANN-MARIE Ramírez, & Ta Armstrong M.A. (2004) Assessment of post-stroke quality of life in cost-effectiveness studies: The usefulness of the Barthel Index and the EuroQoL-5D. Quality of Life Research, 15, 246-80           Physical Therapy Evaluation Charge Determination   History Examination Presentation Decision-Making   MEDIUM  Complexity : 1-2 comorbidities / personal factors will impact the outcome/ POC  LOW Complexity : 1-2 Standardized tests and measures addressing body structure, function, activity limitation and / or participation in recreation  LOW Complexity : Stable, uncomplicated  LOW Complexity : FOTO score of       Based on the above components, the patient evaluation is determined to be of the following complexity level: LOW     Pain Rating:  3/10 (just had Toradol)    Activity Tolerance:   Fair and Poor  Had been orthostatic, but has had a bolus.  Is still complaining of dizziness, but BP below:  Vitals:    09/22/22 1245 09/22/22 1345 09/22/22 1354 09/22/22 1405   BP: (!) 88/46 102/67  Comment: 102/67 Comment: 113/71 115/63   BP 1 Location: Left upper arm Left upper arm Left upper arm Left upper arm   BP Patient Position: At rest At rest;Supine Sitting  Comment: sitting on edge of bed after bed mobility Sitting  Comment: after standing and taking a few steps   Pulse: 99      Temp: 98 °F (36.7 °C)      Resp: 20      Height:       Weight:       SpO2: 94%           After treatment patient left in no apparent distress:   Supine in bed, Call bell within reach, Caregiver / family present, Side rails x 3, and nurse notified. COMMUNICATION/EDUCATION:   The patients plan of care was discussed with: Registered nurse and physician. Fall prevention education was provided and the patient/caregiver indicated understanding., Patient/family have participated as able in goal setting and plan of care. , and Patient/family agree to work toward stated goals and plan of care.     Thank you for this referral.  Hillary Monroy   Time Calculation: 30 mins

## 2022-09-22 NOTE — PROGRESS NOTES
Transition of Care: Home with IV abx and HH vs SNF, pending progress. CM sent referral to [facility] to check insurance coverage for home IV abx. Final IV orders/PICC pending. Chart reviewed. CM met with patient at bedside. Patient lives alone in [place], and has daughter that lives in [address] and checks on him regularly. Noted if patient is moving well and can go home, plan will be to stay at the daughters home in [address] CM will need to arrange home health and home IV abx. Medicare pt has received, reviewed, and signed 1st IM letter informing them of their right to appeal the discharge. Signed copy has been placed on pt bedside chart.        JORDAN Schwab/SAVANNAH

## 2022-09-22 NOTE — PROGRESS NOTES
Ortho NP Note    POD# 1  s/p RIGHT KNEE INCISION AND DRAINAGE WITH POLYETHYLENE EXCHANGE (SPINAL/IV SED)   Pt seen with family at bedside    Pt seated in bed, cleaning dentures. Pain remains tolerable using primarily tylenol, toradol. Took oxycodone yesterday evening, denies side effects. Is asking for additional tylenol at this time. Per patient, he has option of staying with daughter following surgery in [facility] but with stated goal of eventual return to his own residence. Patient open to placement as well pending therapy progress. Denies fever/chills. No cough. No SOB, CP. Denies N/V.    VSS Afebrile. Visit Vitals  /67 (BP 1 Location: Left upper arm)   Pulse 85   Temp 97.7 °F (36.5 °C)   Resp 16   Ht 5' 8\" (1.727 m)   Wt 85.3 kg (188 lb)   SpO2 97%   BMI 28.59 kg/m²       Voiding status: ag in place at time of exam, discussed protocol for removal with RN  Output (mL)  Urine Voided: 225 ml (09/21/22 1600)  Last Bowel Movement Date: 09/20/22 (09/21/22 1754)      Labs    Lab Results   Component Value Date/Time    HGB 9.3 (L) 09/22/2022 02:58 AM      Lab Results   Component Value Date/Time    INR 1.2 (H) 09/21/2022 12:07 PM    INR (POC) 1.8 (H) 09/21/2022 11:56 AM      Lab Results   Component Value Date/Time    Sodium 142 09/22/2022 02:58 AM    Potassium 4.4 09/22/2022 02:58 AM    Chloride 110 (H) 09/22/2022 02:58 AM    CO2 28 09/22/2022 02:58 AM    Glucose 89 09/22/2022 02:58 AM    BUN 27 (H) 09/22/2022 02:58 AM    Creatinine 1.28 09/22/2022 02:58 AM    Calcium 8.4 (L) 09/22/2022 02:58 AM     Recent Glucose Results:   Lab Results   Component Value Date/Time    GLU 89 09/22/2022 02:58 AM           Body mass index is 28.59 kg/m². : A BMI > 30 is classified as obesity and > 40 is classified as morbid obesity. ACE to remain in place into POD 2, no strike through drainage. Cryotherapy in place over incision  Calves soft and supple;  No pain with passive stretch  Bilateral LEs warm, dry. 2+ DP pulses. Sensation and motor intact - PF/DF/EHL intact 5/5  Foot Pumps for mechanical DVT proph while in bed     PLAN:  1) PT: BID WBAT  2) Anticoagulation:  Coumadin, pharmacy to dose, DVT Prophylaxis. Daily INR. Encouraged early mobilization, bed exercises, and SCD use. 3) Pain - Multimodal approach including cryotherapy, scheduled Tylenol & Toradol with  PRN oxycodone & IV dilaudid  4) Subacute infection of right total knee: ID consulted, appreciate recommendations. Intra operative cultures pending. Pre op culture: strep. Likely requiring long term IV ABX therapy. Continue ceftriaxone. 5) Anemia, present on admission: Hgb at PAT: 11.9. EBL: 200 mL. Hgb on POD 1: 9.3. Expected Acute blood loss post-op anemia compounding baseline anemia, continue to monitor. 6) Hypotension: 500 mL bolus given, extended IV fluids. Encouraged PO intake, continue to monitor VS.    7) Urine management:  Ag out today, POD 1. Given hx of POUR, carefully monitor I/Os and follow algorithm   8) Post op care: Continue OBR, encouraged IS. Follow up in 3 weeks with Dr Mason John. ACE in place until POD 2, Aquacel to remain in place x 7 days unless integrity is lost.   9) Readniess for discharge:     [] Vital Signs stable    [] Hgb stable    [] + Voiding : ag in place   [x] Wound intact, drainage minimal    [x] Tolerating PO intake     [] Cleared by PT (OT if applicable)     [] Stair training completed (if applicable)    [] Independent / Contact Guard Assist (household distance)     [] Bed mobility     [] Car transfers     [] ADLs    [x] Adequate pain control on oral medication alone     Not yet medically ready for discharge, culture results and ID plan pending. SNF vs home with [facility] pending therapy progress, infusion. Brigido Herman NP  Available via Perfect Serve  Discussed with Dr Mason John.

## 2022-09-23 ENCOUNTER — APPOINTMENT (OUTPATIENT)
Dept: GENERAL RADIOLOGY | Age: 87
DRG: 467 | End: 2022-09-23
Attending: PHYSICIAN ASSISTANT
Payer: MEDICARE

## 2022-09-23 LAB
CK SERPL-CCNC: 72 U/L (ref 39–308)
INR PPP: 1.3 (ref 0.9–1.1)
PROTHROMBIN TIME: 13.5 SEC (ref 9–11.1)

## 2022-09-23 PROCEDURE — 74011250636 HC RX REV CODE- 250/636: Performed by: INTERNAL MEDICINE

## 2022-09-23 PROCEDURE — 74011000250 HC RX REV CODE- 250: Performed by: PHYSICIAN ASSISTANT

## 2022-09-23 PROCEDURE — 76937 US GUIDE VASCULAR ACCESS: CPT

## 2022-09-23 PROCEDURE — 97530 THERAPEUTIC ACTIVITIES: CPT

## 2022-09-23 PROCEDURE — 82550 ASSAY OF CK (CPK): CPT

## 2022-09-23 PROCEDURE — 85610 PROTHROMBIN TIME: CPT

## 2022-09-23 PROCEDURE — C1751 CATH, INF, PER/CENT/MIDLINE: HCPCS

## 2022-09-23 PROCEDURE — 74011250636 HC RX REV CODE- 250/636: Performed by: PHYSICIAN ASSISTANT

## 2022-09-23 PROCEDURE — 77030020365 HC SOL INJ SOD CL 0.9% 50ML

## 2022-09-23 PROCEDURE — 02HV33Z INSERTION OF INFUSION DEVICE INTO SUPERIOR VENA CAVA, PERCUTANEOUS APPROACH: ICD-10-PCS | Performed by: INTERNAL MEDICINE

## 2022-09-23 PROCEDURE — 74011000258 HC RX REV CODE- 258: Performed by: INTERNAL MEDICINE

## 2022-09-23 PROCEDURE — 36573 INSJ PICC RS&I 5 YR+: CPT | Performed by: PHYSICIAN ASSISTANT

## 2022-09-23 PROCEDURE — 71045 X-RAY EXAM CHEST 1 VIEW: CPT

## 2022-09-23 PROCEDURE — 36415 COLL VENOUS BLD VENIPUNCTURE: CPT

## 2022-09-23 PROCEDURE — 65270000029 HC RM PRIVATE

## 2022-09-23 PROCEDURE — 97116 GAIT TRAINING THERAPY: CPT

## 2022-09-23 PROCEDURE — 74011250637 HC RX REV CODE- 250/637: Performed by: PHYSICIAN ASSISTANT

## 2022-09-23 PROCEDURE — 74011250637 HC RX REV CODE- 250/637

## 2022-09-23 PROCEDURE — 97165 OT EVAL LOW COMPLEX 30 MIN: CPT

## 2022-09-23 RX ORDER — FUROSEMIDE 20 MG/1
20 TABLET ORAL
Status: DISCONTINUED | OUTPATIENT
Start: 2022-09-23 | End: 2022-09-23

## 2022-09-23 RX ORDER — METOPROLOL TARTRATE 25 MG/1
25 TABLET, FILM COATED ORAL DAILY
Status: DISCONTINUED | OUTPATIENT
Start: 2022-09-23 | End: 2022-09-23

## 2022-09-23 RX ORDER — FUROSEMIDE 20 MG/1
10 TABLET ORAL
Status: DISCONTINUED | OUTPATIENT
Start: 2022-09-23 | End: 2022-09-30 | Stop reason: HOSPADM

## 2022-09-23 RX ORDER — OXYCODONE HYDROCHLORIDE 5 MG/1
2.5-5 TABLET ORAL
Qty: 42 TABLET | Refills: 0 | Status: SHIPPED | OUTPATIENT
Start: 2022-09-23 | End: 2022-09-27 | Stop reason: SDUPTHER

## 2022-09-23 RX ORDER — AMOXICILLIN 250 MG
1 CAPSULE ORAL 2 TIMES DAILY
Qty: 60 TABLET | Refills: 0 | Status: SHIPPED | OUTPATIENT
Start: 2022-09-23 | End: 2022-10-23

## 2022-09-23 RX ORDER — FUROSEMIDE 20 MG/1
20 TABLET ORAL DAILY
Status: DISCONTINUED | OUTPATIENT
Start: 2022-09-24 | End: 2022-09-30 | Stop reason: HOSPADM

## 2022-09-23 RX ORDER — FUROSEMIDE 40 MG/1
40 TABLET ORAL DAILY
Status: DISCONTINUED | OUTPATIENT
Start: 2022-09-24 | End: 2022-09-23

## 2022-09-23 RX ORDER — ACETAMINOPHEN 500 MG
500 TABLET ORAL
Qty: 100 TABLET | Refills: 0 | Status: SHIPPED | OUTPATIENT
Start: 2022-09-23

## 2022-09-23 RX ORDER — POLYETHYLENE GLYCOL 3350 17 G/17G
17 POWDER, FOR SOLUTION ORAL DAILY
Qty: 14 PACKET | Refills: 0 | Status: SHIPPED | OUTPATIENT
Start: 2022-09-23 | End: 2022-10-07

## 2022-09-23 RX ORDER — WARFARIN SODIUM 5 MG/1
5 TABLET ORAL ONCE
Status: COMPLETED | OUTPATIENT
Start: 2022-09-23 | End: 2022-09-23

## 2022-09-23 RX ADMIN — WARFARIN SODIUM 5 MG: 5 TABLET ORAL at 12:26

## 2022-09-23 RX ADMIN — FUROSEMIDE 10 MG: 20 TABLET ORAL at 20:33

## 2022-09-23 RX ADMIN — OXYCODONE 5 MG: 5 TABLET ORAL at 20:33

## 2022-09-23 RX ADMIN — ACETAMINOPHEN 500 MG: 325 TABLET, FILM COATED ORAL at 07:50

## 2022-09-23 RX ADMIN — SENNOSIDES AND DOCUSATE SODIUM 1 TABLET: 50; 8.6 TABLET ORAL at 09:53

## 2022-09-23 RX ADMIN — ACETAMINOPHEN 500 MG: 325 TABLET, FILM COATED ORAL at 17:29

## 2022-09-23 RX ADMIN — OXYCODONE 5 MG: 5 TABLET ORAL at 16:18

## 2022-09-23 RX ADMIN — DAPTOMYCIN 500 MG: 500 INJECTION, POWDER, LYOPHILIZED, FOR SOLUTION INTRAVENOUS at 15:33

## 2022-09-23 RX ADMIN — SENNOSIDES AND DOCUSATE SODIUM 1 TABLET: 50; 8.6 TABLET ORAL at 17:29

## 2022-09-23 RX ADMIN — SODIUM CHLORIDE, PRESERVATIVE FREE 10 ML: 5 INJECTION INTRAVENOUS at 14:00

## 2022-09-23 RX ADMIN — CEFTRIAXONE SODIUM 2 G: 2 INJECTION, POWDER, FOR SOLUTION INTRAMUSCULAR; INTRAVENOUS at 01:01

## 2022-09-23 RX ADMIN — ACETAMINOPHEN 500 MG: 325 TABLET, FILM COATED ORAL at 13:54

## 2022-09-23 RX ADMIN — POLYETHYLENE GLYCOL 3350 17 G: 17 POWDER, FOR SOLUTION ORAL at 09:54

## 2022-09-23 RX ADMIN — OXYCODONE 5 MG: 5 TABLET ORAL at 01:01

## 2022-09-23 RX ADMIN — OXYCODONE 2.5 MG: 5 TABLET ORAL at 09:53

## 2022-09-23 RX ADMIN — OXYCODONE 5 MG: 5 TABLET ORAL at 12:26

## 2022-09-23 RX ADMIN — ACETAMINOPHEN 500 MG: 325 TABLET, FILM COATED ORAL at 20:32

## 2022-09-23 RX ADMIN — SODIUM CHLORIDE, PRESERVATIVE FREE 10 ML: 5 INJECTION INTRAVENOUS at 20:37

## 2022-09-23 RX ADMIN — SODIUM CHLORIDE, PRESERVATIVE FREE 10 ML: 5 INJECTION INTRAVENOUS at 07:50

## 2022-09-23 NOTE — PROCEDURES
Order for PICC received and verified. Consent obtained from pt after risks, benefits, procedure explained and questions answered. PICC placement note:    PRE-PROCEDURE VERIFICATION  Correct Procedure: yes  Correct Site:  yes  Temperature: Temp: 97.5 °F (36.4 °C), Temperature Source: Temp Source: Axillary  Recent Labs     09/23/22  0105 09/22/22  1733 09/22/22  0258   BUN  --   --  27*   CREA  --   --  1.28   INR 1.3*   < >  --     < > = values in this interval not displayed. Allergies: Tape [adhesive]  Education materials, including PICC Booklet, for PICC Care given to patient: yes. See Patient Education activity for further details. PROCEDURE DETAIL  PICC placed using Modified Seldinger Technique. A double lumen PICC line was started for antibiotic therapy. The following documentation is in addition to the PICC properties in the lines/airways flowsheet :  Lot #: [id number]  Was xylocaine 1% used intradermally:  yes  Catheter Length: 38 (cm)  Vein Selection for PICC:right basilic  Central Line Bundle followed yes  Complication Related to Insertion: none    The placement was verified by CXR:  The  tip location is in the lower superior vena cava. See CXR results for PICC tip placement. Report given to nurse Marlin Dhillon is okay to use.     Akira Suarez RN

## 2022-09-23 NOTE — PROGRESS NOTES
Problem: Self Care Deficits Care Plan (Adult)  Goal: *Acute Goals and Plan of Care (Insert Text)  Description: FUNCTIONAL STATUS PRIOR TO ADMISSION: Patient was independent and active without use of DME.    HOME SUPPORT: The patient lived alone with family in area to provide assistance. Occupational Therapy Goals  Initiated 9/23/2022  1. Patient will perform lower body ADLs with contact guard assistance within 7 day(s). 2.  Patient will perform upper body ADLs standing 5 mins without fatigue or LOB with contact guard assistance within 7 day(s). 3.  Patient will perform all aspects of toileting with contact guard assistance within 7 day(s). 4.  Patient will participate in upper extremity therapeutic exercise/activities with contact guard assistance for 10 minutes within 7 day(s). 5.  Patient will utilize energy conservation techniques during functional activities without cues within 7 day(s). Outcome: Not Met    OCCUPATIONAL THERAPY EVALUATION  Patient: Cassie Angel (77 y.o. male)  Date: 9/23/2022  Primary Diagnosis: Infection of total knee replacement, initial encounter ([facility] [address] [T84.59XA, Z96.659]  Procedure(s) (LRB):  RIGHT KNEE INCISION AND DRAINAGE WITH POLYETHYLENE EXCHANGE (SPINAL/IV SED) (Right) 2 Days Post-Op   Precautions:  Fall, WBAT    ASSESSMENT  Based on the objective data described below, the patient presents with limited ADL performance s/p admission for infection of TKA with need for R knee I&D, now POD#2. Patient ADLs limited by impaired balance, generalized weakness, decreased functional activity tolerance, and impaired lower body access. Patient lives alone at baseline and completes ADLs IND/mod I; daughter available to assist with IADLs, noted possible plan to have patient D/C to daughter's home. Today, patient required min A to come to sitting EOB - A to manage RLE and to bring trunk upright.  While sitting, patient simulated reaching for feet and tailor sitting - unable to achieve adequate reach to feet or hip/knee ROM for tailor sitting. Patient with baseline limited ROM in RUE 2/2 rotator cuff injury but BUE strength otherwise WFL. Patient noted to be shaking under blankets and while sitting EOB but does not report being especially cold - made RN aware in order to determine possible cause. Patient did not want to stand 2/2 pain in R knee and fatigue. Patient returned to supine with min A and left in semisupine with call bell in reach, family bedside, RN aware, SCDs in place. Will continue to follow, patient may benefit from short IPR stay to maximize functional IND and decrease caregiver burden. Current Level of Function Impacting Discharge (ADLs/self-care): up to mod A for ADLs    Functional Outcome Measure: The patient scored Total: 50/100 on the Barthel Index outcome measure which is indicative of being 50% impairment in basic self-care. Other factors to consider for discharge: lives alone, mod I at baseline     Patient will benefit from skilled therapy intervention to address the above noted impairments. PLAN :  Recommendations and Planned Interventions: self care training, functional mobility training, therapeutic exercise, balance training, therapeutic activities, endurance activities, patient education, home safety training, and family training/education    Frequency/Duration: Patient will be followed by occupational therapy 5 times a week to address goals. Recommendation for discharge: (in order for the patient to meet his/her long term goals)  Therapy 3 hours per day 5-7 days per week    This discharge recommendation:  Has been made in collaboration with the attending provider and/or case management    IF patient discharges home will need the following DME: shower chair       SUBJECTIVE:   Patient stated If I fall over, you'll just have to catch me.     OBJECTIVE DATA SUMMARY:   HISTORY:   Past Medical History:   Diagnosis Date    Arthritis     DJD Cancer ([facility] [address]     prostate    Cancer ([facility] [address]     SKIN CA RIGHT EYEBROW AREA    Chronic pain     History of colonoscopy with polypectomy 2005~    Hypercholesterolemia     Macular degeneration     Other ill-defined conditions(799.89)     cataracts    PAF (paroxysmal atrial fibrillation) (HCC)     Thromboembolus (HCC)     3 x left leg, 1 x right leg 1990'S     Past Surgical History:   Procedure Laterality Date    HX CATARACT REMOVAL Bilateral     HX KNEE REPLACEMENT Left     HX MOHS PROCEDURES  1995    right    HX OTHER SURGICAL  1996    Tony filter insertion    HX PROSTATECTOMY  age 72    HX ROTATOR CUFF REPAIR Right     HX TONSILLECTOMY      DE LIGMT REVISION,KNEE,EXTRA-ARTIC Right 04/2021    DE TOTAL HIP ARTHROPLASTY      left    DE TOTAL KNEE ARTHROPLASTY      right    VASCULAR SURGERY PROCEDURE UNLIST      1996 IVC FILTER       Expanded or extensive additional review of patient history:     Home Situation  Home Environment: Private residence  # Steps to Enter: 3  Rails to Enter: Yes  Hand Rails : Right  Wheelchair Ramp: No  One/Two Story Residence: One story  Living Alone: Yes  Support Systems: Child(kita) (will be staying with daughter)  Patient Expects to be Discharged to[de-identified] Skilled nursing facility  Current DME Used/Available at Home: Walker, rolling, Cane, straight, Grab bars  Tub or Shower Type: Shower    Hand dominance: Right    EXAMINATION OF PERFORMANCE DEFICITS:  Cognitive/Behavioral Status:  Neurologic State: Alert  Orientation Level: Oriented X4  Cognition: Appropriate for age attention/concentration; Follows commands  Perception: Appears intact  Perseveration: No perseveration noted  Safety/Judgement: Awareness of environment; Fall prevention    Skin: appears grossly intact    Edema: none noted in BUEs    Hearing:   Auditory  Auditory Impairment: Hard of hearing, bilateral  Hearing Aids/Status: With patient    Vision/Perceptual:    Tracking: Able to track stimulus in all quadrants w/o difficulty Diplopia: No    Acuity: Within Defined Limits    Corrective Lenses: Reading glasses    Range of Motion:  In BUEs  AROM: Generally decreased, functional (rotator cuff injury in RUE - limited shoulder ROM at baseline)  PROM: Generally decreased, functional    Strength: In BUEs  Strength: Generally decreased, functional    Coordination:  Coordination: Within functional limits  Fine Motor Skills-Upper: Left Intact; Right Intact    Gross Motor Skills-Upper: Left Intact; Right Intact    Tone & Sensation:  In BUEs  Tone: Normal  Sensation: Impaired (NIRMALA tingling in bottoms of feet)    Balance:  Sitting: Intact  Standing: Impaired; Without support  Standing - Static: Good;Constant support  Standing - Dynamic : Good;Fair;Constant support    Functional Mobility and Transfers for ADLs:  Bed Mobility:  Supine to Sit: Minimum assistance  Sit to Supine: Minimum assistance  Scooting: Minimum assistance    Transfers:  Sit to Stand: Contact guard assistance  Stand to Sit: Contact guard assistance  Bed to Chair: Contact guard assistance    ADL Assessment:  Feeding: Setup    Oral Facial Hygiene/Grooming: Minimum assistance    Bathing: Moderate assistance    Type of Bath: Chlorhexidine (CHG)    Upper Body Dressing: Minimum assistance    Lower Body Dressing: Moderate assistance    Toileting: Moderate assistance    ADL Intervention and task modifications:    Lower Body Dressing Assistance  Socks: Maximum assistance (simlated)  Leg Crossed Method Used:  (unable)  Position Performed: Bending forward method;Seated edge of bed (unable to reach feet when bending forward)  Cues: Physical assistance;Verbal cues provided    Cognitive Retraining  Safety/Judgement: Awareness of environment; Fall prevention    Functional Measure:    Barthel Index:  Bathin  Bladder: 5  Bowels: 10  Groomin  Dressin  Feeding: 10  Mobility: 0  Stairs: 0  Toilet Use: 5  Transfer (Bed to Chair and Back): 10  Total: 50/100      The Barthel ADL Index: Guidelines  1. The index should be used as a record of what a patient does, not as a record of what a patient could do. 2. The main aim is to establish degree of independence from any help, physical or verbal, however minor and for whatever reason. 3. The need for supervision renders the patient not independent. 4. A patient's performance should be established using the best available evidence. Asking the patient, friends/relatives and nurses are the usual sources, but direct observation and common sense are also important. However direct testing is not needed. 5. Usually the patient's performance over the preceding 24-48 hours is important, but occasionally longer periods will be relevant. 6. Middle categories imply that the patient supplies over 50 per cent of the effort. 7. Use of aids to be independent is allowed. Score Interpretation (from [address] [facility] [address])    Independent   60-79 Minimally independent   40-59 Partially dependent   20-39 Very dependent   <20 Totally dependent     -Malinda Napier., Barthel, DFernieW. (1965). Functional evaluation: the Barthel Index. [facility] ([address] (1997). The Barthel activities of daily living index: self-reporting versus actual performance in the old (> or = 75 years). Journal of [address] [place] [address] 45(7), [facility], J.VINCE, Nayeli Bullock., Neena Bee. (1999). Measuring the change in disability after inpatient rehabilitation; comparison of the responsiveness of the Barthel Index and Functional [place] Measure. Journal of Neurology, Neurosurgery, and Psychiatry, 66(4), 839-604. WOLF Carvajal, ANN-MARIE Ramírez, & Luis Manuel Aguilar M.A. (2004) Assessment of post-stroke quality of life in cost-effectiveness studies: The usefulness of the Barthel Index and the EuroQoL-5D.  Quality of Life Research, 15, 464-34     Occupational Therapy Evaluation Charge Determination   History Examination Decision-Making   LOW Complexity : Brief history review  MEDIUM Complexity : 3-5 performance deficits relating to physical, cognitive , or psychosocial skils that result in activity limitations and / or participation restrictions MEDIUM Complexity : Patient may present with comorbidities that affect occupational performnce. Miniml to moderate modification of tasks or assistance (eg, physical or verbal ) with assesment(s) is necessary to enable patient to complete evaluation       Based on the above components, the patient evaluation is determined to be of the following complexity level: MEDIUM  Pain Rating:  Reporting some pain in R knee, did not quantify    Activity Tolerance:   Fair    After treatment patient left in no apparent distress:    Supine in bed, Call bell within reach, Caregiver / family present, Side rails x 3, and RN aware    COMMUNICATION/EDUCATION:   The patients plan of care was discussed with: Physical therapist and Registered nurse. Home safety education was provided and the patient/caregiver indicated understanding. and Patient/family have participated as able in goal setting and plan of care. This patients plan of care is appropriate for delegation to [facility].     Thank you for this referral.  Eugene Dixon OT  Time Calculation: 17 mins

## 2022-09-23 NOTE — PROGRESS NOTES
Verbal shift change report given to JAKI Cruz (oncoming nurse) by Tyler Tracy RN (offgoing nurse). Report included the following information SBAR.

## 2022-09-23 NOTE — PROGRESS NOTES
Ortho NP Note    POD# 2  s/p RIGHT KNEE INCISION AND DRAINAGE WITH POLYETHYLENE EXCHANGE (SPINAL/IV SED)   Pt seen in room    Pt resting in bed. Concerned regarding medications being given in hospital.  Reviewed medications in chart at bedside with patient, patient states daughter is bringing medications with her this afternoon. Otherwise, complains of pain to right knee. Currently rating 7/10 with recent oxycodone administration. Denies SEs to medication. Denies fever, chills. No HA, vision changes. Denies cough, SOB, chest pain. States voiding at baseline, + flatus. Visit Vitals  /86 (BP 1 Location: Right upper arm, BP Patient Position: Supine)   Pulse 95   Temp 97.5 °F (36.4 °C)   Resp 26   Ht 5' 8\" (1.727 m)   Wt 85.3 kg (188 lb)   SpO2 97%   BMI 28.59 kg/m²       Voiding status: spontaneous void, urinal at bedside. Output (mL)  Urine Voided: 225 ml (09/21/22 1600)  Last Bowel Movement Date: 09/20/22 (09/21/22 1754)      Labs    Lab Results   Component Value Date/Time    HGB 9.3 (L) 09/22/2022 02:58 AM      Lab Results   Component Value Date/Time    INR 1.3 (H) 09/23/2022 01:05 AM      Lab Results   Component Value Date/Time    Sodium 142 09/22/2022 02:58 AM    Potassium 4.4 09/22/2022 02:58 AM    Chloride 110 (H) 09/22/2022 02:58 AM    CO2 28 09/22/2022 02:58 AM    Glucose 89 09/22/2022 02:58 AM    BUN 27 (H) 09/22/2022 02:58 AM    Creatinine 1.28 09/22/2022 02:58 AM    Calcium 8.4 (L) 09/22/2022 02:58 AM     Recent Glucose Results: No results found for: GLU, GLUPOC, GLUCPOC        ASSESSMENT/PLAN: I have reviewed progress note and am in agreement with assessment and plan as documented by Alin JAUREGUI. Interval updates:       1) PT: BID WBAT in hospital.  Pending PT recommendations for rehab following hospitalization  2) Anticoagulation: Coumadin, pharmacy to dose, for DVT Prophylaxis. INR: 1.3 today. Encouraged ongoing mobilization, bed exercises.    3) Pain - Multimodal approach including cryotherapy, scheduled tylenol with PRN oxycodone while in hospital.    4) Subacute infection of right total knee: ID consulted, appreciate recommendations. Intra operative cultures pending. Pre op culture: S salivarius. PICC placed today, final IV orders : Ceftriaxone 2 grams IV Q 24 hours x 40 days  5) Anemia, present on admission: Hgb at PAT: 11.9. EBL: 200 mL. Hgb on POD 1: 9.3. Expected Acute blood loss post-op anemia compounding baseline anemia, continue to monitor. 6) Post op care: Continue OBR, encouraged IS. Follow up in 3 weeks with Dr Fredis Babin. Aquacel to remain in place x 7 days unless integrity is lost.   7) Hypertension: Resume lasix at half home dose given hypotension yesterday. Did not resume metop from PCP note, patient not taking at home. 8) Readiness for discharge: CM involved for placement vs home with home health. [employer] ("[employer] ([email or web address] [place] [id number]) aware of patient if going to rehab then home for continuity of care and continuation of ABX therapy. [x] Vital Signs stable    [x] + Voiding    [x] Wound intact, drainage minimal    [x] Tolerating PO intake     [] Cleared by PT (OT if applicable)     [] Stair training completed (if applicable)    [] Independent / Contact Guard Assist (household distance)     [] Bed mobility     [] Car transfers     [] ADLs    [x] Adequate pain control on oral medication alone     Not yet cleared PT. Discharge plan pending therapy progress, CM involved for possible placement (SNF)    Med rec done, printed oxy rx on chart.      India Andre NP  Available via Perfect Serve

## 2022-09-23 NOTE — DISCHARGE INSTRUCTIONS
Post-op Discharge Instructions Following Total Joint Replacement  Martha Ortega MD  [address]  [phone]  Follow-up Office Visit  See Dr. Saige Zhou approximately 3-4 weeks from date of surgery. Call [phone] to make an appointment. Call Bandar More LPN if you have questions or concerns, [phone]. Activity  Use your walker for ambulation. Weight bearing as tolerated unless instructed otherwise by the physical therapist. Get up every hour you are awake and take a brief walk. Lengthen walking distance daily as your strength improves. Continue using your walker until seen in the office for your first follow up visit. Practice your exercises 3 times daily as instructed by the physical therapist. Luisa Castro for 20 minutes after exercising. No driving until seen in the office for your first follow up visit. Incision Care  The light brown Aquacel surgical dressing is waterproof and is to remain on your incision for 7 days. On the 7th day, carefully lift the edge of the dressing to break the adhesive seal and gently peel it off. If your Aquacel dressings comes loose or falls off before the 7th day, replace it with a dry sterile gauze dressing and change this dressing daily. Once there is no drainage on the bandage, you mean leave the incision open to air. You may take a shower with the Aquacel dressing in place. After you remove the Aquacel dressing on day 7, you may continue to shower and get your incision wet in the shower. Do not submerge your incision under water in a bathtub, hot tub, swimming pool, etc. until after you have been evaluated at your first office visit. Medications  Blood Clot Prevention: Take medication as prescribed by your physician for 4 weeks postop. Pain Management: Take pain medication as prescribed; wean yourself off of pain medication as your pain lessens. Take with food. You make also take Tylenol every 4-6 hours as needed for pain.   Do not exceed 3 grams (3000mg) per day. Place an ice bag on or around the incision for 20 minutes on / 20 minutes off as needed throughout the day and night, especially after exercising. Stool Softener: You may want to take a stool softener (such as Senokot-S or Colace) to prevent constipation while taking pain medication. If constipation occurs, you may also use a laxative (such as Dulcolax tablets, Miralax, or a suppository). Diet  Resume usual diet at home. Drink plenty of fluids. Eat foods high in fiber and protein. Calcium and Vitamin D supplements recommended. Avoid alcoholic beverages. No smoking. When to call your Orthopaedic Surgeon: If you call after 5pm or on a weekend, the on call physician will return your call  Pain that is not relieved by pain medication, ice, and activity modification  Signs of infection (red incision, continuous drainage from the incision, malodorous drainage, persistent fever greater than 101 degrees Fahrenheit)  Signs of a blood clot in your leg (calf pain, tenderness, redness, and/or swelling of the lower leg)  ? When to call your Primary Care Physician  Concerns about your medical conditions such as diabetes, high blood pressure, asthma, congestive heart failure  Call if blood sugars are elevated, if you have a persistent headache or dizziness, coughing or congestion, constipation or diarrhea, burning with urination, abnormal heart rate (fast or slow)  When to call 911 and go to the nearest Emergency Room  Acute onset of chest pain, shortness of breath, difficulty breathing  IV Antibiotic Orders     1. 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 Routine PICC care  3. Antibiotic:  Ceftriaxone 2 grams IV Q 24 hours  4. Lab each Monday:             CBC/diff/platelets             BMP             CRP  5. Lab each Thursday:             CBC/diff/platelets             BMP  6.   Fax lab to Dr. Aden Salgado @ [phone].  7.  Call Dr. Aden Salgado @ [phone] for WBC under 4, CPK over 300 or creatinine over 2  8. Duration of therapy: 40 days from 9/23             Please call Dr. Adria Jaime @ [phone] before stopping therapy. 9.  Allergies: none to current regimen   10.  Call [phone] with name of [address] [place] [address] and to arrange follow up appointment in 14-21 days     Tuyet Wagoner MD

## 2022-09-23 NOTE — PROGRESS NOTES
Pharmacist Note - Warfarin Dosing  Consult provided for this 89 y.o.male to manage warfarin for h/o DVT/afib, now s/p R knee I&D w/poly exchange    INR Goal: 2 - 3  Risk factors: Age > 65  Daily INR ordered: YES    Recent Labs     09/23/22  0105 09/22/22  1733 09/22/22  0258 09/21/22  1207   HGB  --   --  9.3*  --    INR 1.3* 1.3*  --  1.2*     Date               INR                  Dose  9/21   1.8, 1.2 5 mg  9/22   1.3        5 mg  9/23   1.3        5 mg                                                                               Assessment/ Plan: Will order warfarin 5 mg PO x 1 dose. Pharmacy will continue to monitor daily and adjust therapy as indicated. Please contact the pharmacist at  for outpatient recommendations if needed.       Pedro Dear, PharmD  Clinical Pharmacist, Orthopedics and Med/Surg  [address] [facility] ()

## 2022-09-23 NOTE — PROGRESS NOTES
ID Progress Note  2022    Subjective:     Not feeling all that good right now. Having some knee pain    Objective:     Antibiotics:  Ceftriaxone       Vitals: Visit Vitals  /86 (BP 1 Location: Right upper arm, BP Patient Position: Supine)   Pulse 95   Temp 97.5 °F (36.4 °C)   Resp 26   Ht 5' 8\" (1.727 m)   Wt 85.3 kg (188 lb)   SpO2 97%   BMI 28.59 kg/m²        Tmax:  Temp (24hrs), Av.8 °F (36.6 °C), Min:97.5 °F (36.4 °C), Max:98 °F (36.7 °C)      Exam:  Lungs:  clear to auscultation bilaterally  Heart:  regular rate and rhythm  Abdomen:  soft, non-tender. Bowel sounds normal. No masses,  no organomegaly  Wound dressed and dry    Labs:      Recent Labs     22  0258   HGB 9.3*   BUN 27*   CREA 1.28       Cultures:     Lab Results   Component Value Date/Time    Specimen Description: [facility] 2011 04:10 PM    Specimen Description: [facility] 2011 03:52 PM     Lab Results   Component Value Date/Time    Culture result: No growth thus far, holding 14 days. 2022 02:08 PM    Culture result: NO GROWTH ON SOLID MEDIA THUS FAR 2022 02:08 PM    Culture result: (A) 2022 02:08 PM     GRAM POSITIVE COCCI IN CLUSTERS SEEN ON GRAM STAIN OF THE THIO BROTH    Culture result:  2022 02:08 PM     (NOTE) [facility] CALLED TO Marina Reyes RN ON 22 AT 1431 BY DG    Culture result: No growth thus far, holding 14 days.  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

## 2022-09-23 NOTE — PROGRESS NOTES
Problem: Mobility Impaired (Adult and Pediatric)  Goal: *Acute Goals and Plan of Care (Insert Text)  Description: FUNCTIONAL STATUS PRIOR TO ADMISSION: Patient was independent and active without use of DME.    HOME SUPPORT PRIOR TO ADMISSION: The patient lived alone with daughter to provide intermittent assistance. Patient was independent with ADLs, daughter traveled 2x/week to assist with IADLs and check on him. Physical Therapy Goals  Initiated 9/22/2022    1. Patient will move from supine to sit and sit to supine , scoot up and down, and roll side to side in bed with modified independence within 4 days. 2. Patient will perform sit to stand with modified independence within 4 days. 3. Patient will ambulate with modified independence for 150 feet with the least restrictive device within 4 days. 4. Patient will ascend/descend 4 stairs with cane and one handrail(s) with modified independence within 4 days. 5. Patient will perform home exercise program per protocol with independence within 4 days. 6. Patient will demonstrate AROM 0-90 degrees in operative joint within 4 days. Outcome: Progressing Towards Goal   PHYSICAL THERAPY TREATMENT  Patient: Horacio Liu (57 y.o. male)  Date: 9/23/2022  Diagnosis: Infection of total knee replacement, initial encounter ([facility] [address] [T84.59XA, Z96.659] <principal problem not specified>  Procedure(s) (LRB):  RIGHT KNEE INCISION AND DRAINAGE WITH POLYETHYLENE EXCHANGE (SPINAL/IV SED) (Right) 2 Days Post-Op  Precautions: Fall, WBAT  Chart, physical therapy assessment, plan of care and goals were reviewed. ASSESSMENT  Patient continues with skilled PT services and is progressing towards goals. PICC line placed earlier today. Patient with no dizziness or orthostasis today. Performed bed mobility, transfers and gait. Activity Tolerance limited by pain. .     Current Level of Function Impacting Discharge (mobility/balance): Minimal assistance for bed mobility (reached out with hands to clinicians for assistance)/contact guard for transfers and gait. Ambulated 39  ft with poor gait pattern: pushed RW too far in front of him, decreased swing and stance on right with slightly flexed knee. Cues for safe technique, not walking away from RW when preparing to transfer and reaching back to arms of chair before sitting and lowering self rather than uncontrolled,  \"plopping\" descent. Left up in recliner with LEs elevated, chair alarm, family in room. Instructed patient and family to call nurse when he is ready to go back to bed. Other factors to consider for discharge: Lives alone/IV Abx/Far from independent PLOF         PLAN :  Patient continues to benefit from skilled intervention to address the above impairments. Continue treatment per established plan of care. to address goals. Recommendation for discharge: (in order for the patient to meet his/her long term goals)  Therapy 3 hours per day 5-7 days per week. ..would benefit due to prior high level of function (independent with ADLs)/Motivated/A & O x 4    This discharge recommendation:  Has been made in collaboration with the attending provider and/or case management    IF patient discharges home will need the following DME: patient owns DME required for discharge       SUBJECTIVE:   Patient stated My knee is really hurting today.     OBJECTIVE DATA SUMMARY:   Critical Behavior:  Neurologic State: Alert  Orientation Level: Oriented X4  Cognition: Appropriate decision making     Functional Mobility Training:  Bed Mobility:     Supine to Sit: Minimum assistance  Sit to Supine:  (Left up in recliner)           Transfers:  Sit to Stand: Contact guard assistance  Stand to Sit: Contact guard assistance        Bed to Chair: Contact guard assistance                    Balance:  Sitting: Intact  Standing: Impaired; Without support  Standing - Static: Good;Constant support  Standing - Dynamic : Good;Fair;Constant support  Ambulation/Gait Training:  Distance (ft): 45 Feet (ft)  Assistive Device: Walker, rolling;Gait belt  Ambulation - Level of Assistance: Contact guard assistance        Gait Abnormalities: Antalgic (forward flexed/too far away from RW)        Base of Support: Widened;Shift to left  Stance: Right decreased (and with slight knee flexion with stance)  Speed/Angela: Slow  Step Length: Left shortened  Swing Pattern: Right asymmetrical     Interventions: Safety awareness training; Tactile cues; Verbal cues (cues to stay close to RW/not \"park\" and walk away from RW/use hands to reach back for arms of chair)                  Therapeutic Exercises: Ankle Pumps  Ham Sets  Quad Sets  Heel Slides  X 10 each, 5-7x daily      Pain Ratin/10    Activity Tolerance:   Fair--limited by pain    After treatment patient left in no apparent distress:   Sitting in chair, Call bell within reach, Bed / chair alarm activated, Caregiver / family present, and nurse notified. COMMUNICATION/COLLABORATION:   The patients plan of care was discussed with: Registered nurse, Physician, and Case management.      Jonatan Corrigan   Time Calculation: 30 mins

## 2022-09-23 NOTE — PROGRESS NOTES
Bedside shift change report given to Janessa  (oncoming nurse) by Elham Ward  (offgoing nurse). Report included the following information SBAR, Kardex, Intake/Output, and MAR.

## 2022-09-23 NOTE — PROGRESS NOTES
IV Antibiotic Orders    1. 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 Routine PICC care  3. Antibiotic:  Ceftriaxone 2 grams IV Q 24 hours  4. Lab each Monday:   CBC/diff/platelets   BMP   CRP  5. Lab each Thursday:   CBC/diff/platelets   BMP  6. Fax lab to Dr. Pham Comment @ [phone].  7.  Call Dr. Pham Comment @ [phone] for WBC under 4 or creatinine over 2  8. Duration of therapy: 40 days from 9/23   Please call Dr. Pham Comment @ [phone] before stopping therapy. 9.  Allergies: none to current regimen   10.  Call [phone] with name of [address] [place] [address] and to arrange follow up appointment in 14-21 days    Ranell Goldberg, MD

## 2022-09-23 NOTE — PROGRESS NOTES
Late entry for 6am rounds    Orthopaedics Daily Progress Note                            Date of Surgery:  9/21/2022      Patient: Kasi Mccormick   YOB: 1928  Age: 80 y.o. SUBJECTIVE:   2 Days Post-Op following RIGHT KNEE INCISION AND DRAINAGE WITH POLYETHYLENE EXCHANGE (SPINAL/IV SED). The patient's post operative pain is controlled. No CP/SOB. No N/V. The patient's mobility will be evaluated today during PT sessions. IV ABX - ID following ,needs PICC. OBJECTIVE:     Vital Signs:    Visit Vitals  /61 (BP 1 Location: Left arm, BP Patient Position: Lying)   Pulse (!) 103   Temp 98 °F (36.7 °C)   Resp 17   Ht 5' 8\" (1.727 m)   Wt 188 lb (85.3 kg)   SpO2 97%   BMI 28.59 kg/m²       Physical Exam:  General: A&Ox3. The patient is cooperative, and in no acute distress. Respiratory: Respirations are unlabored. Surgical site(s): dressing clean, dry  Musculoskeletal: Calves are soft, supple, and non-tender upon palpation. Motor 5/5. Neurological:  Neurovascularly intact with good dorsi and plantar flexion. Pulses symmetrical.    Laboratory Values:             Recent Results (from the past 12 hour(s))   PROTHROMBIN TIME + INR    Collection Time: 09/23/22  1:05 AM   Result Value Ref Range    INR 1.3 (H) 0.9 - 1.1      Prothrombin time 13.5 (H) 9.0 - 11.1 sec         PLAN:     S/P RIGHT KNEE INCISION AND DRAINAGE WITH POLYETHYLENE EXCHANGE (SPINAL/IV SED) -Continue WBAT. -Mobilize and continue with PT/OT until discharged    Dressing removed, bandage dry; ice applied, lower leg elevated for terminal knee extension, heel relief from bed       Hemodynamics Acute blood loss anemia as expected. Patient asymptomatic. Continue to monitor. Wound Monitor postop dressing; no postop dressing changes necessary. Reinforce PRN. Post Operative Pain Pain Control: stable, mild-to-moderate joint symptoms intermittently, reasonably well controlled by current meds.      DVT Prophylaxis Continue with SCD'S, Ankle Pump Exercises. Coumadin     Discharge Disposition Discharge plan:  pending PT recs. Will order PICC today (Friday) pending clearance over the weekend. Need final IV ABX orders per ID and PICC education for family.         Signed By: Elfida Brittle, PA-C  September 23, 2022 7:53 AM

## 2022-09-23 NOTE — PROGRESS NOTES
Transition of Care: IPR vs. SNF, referral pending with [facility] (first choice) and [id number]. Noted patient will need extended course of IV abx, final IV orders pending, PICC was placed today. Referral has been sent to [facility] to follow for duration of abx if patient is discharged from rehab prior to finishing abx. CM called the daughter #[phone], she will be here shortly. CM will meet with daughter when she arrives. CM met with patient and family at bedside. The Plan for Transition of Care is related to the following treatment goals: IPR vs. SNF, preference is [facility] and [id number]. The Patient and/or patient representative  was provided with a choice of provider and agrees   with the discharge plan. [x] Yes [] No    Freedom of choice list was provided with basic dialogue that supports the patient's individualized plan of care/goals, treatment preferences and shares the quality data associated with the providers. [x] Yes [] No      1:23 PM: CM received call from Dr. Karma Marrufo, the final ID plan is still pending, will need to await cultures. CM will follow. CM spoke with Che Simeon with [facility] [phone]. They will review. CM called [phone] [facility] admissions #[phone], they do not have any beds currently, however they may have a bed on Monday. CM will follow-up.     Rodrigue Miranda, BSW/CRM

## 2022-09-24 LAB
ALBUMIN SERPL-MCNC: 2 G/DL (ref 3.5–5)
ALBUMIN/GLOB SERPL: 0.5 {RATIO} (ref 1.1–2.2)
ALP SERPL-CCNC: 79 U/L (ref 45–117)
ALT SERPL-CCNC: 10 U/L (ref 12–78)
ANION GAP SERPL CALC-SCNC: 4 MMOL/L (ref 5–15)
AST SERPL-CCNC: 12 U/L (ref 15–37)
ATRIAL RATE: 72 BPM
BASOPHILS # BLD: 0 K/UL (ref 0–0.1)
BASOPHILS NFR BLD: 0 % (ref 0–1)
BILIRUB SERPL-MCNC: 0.4 MG/DL (ref 0.2–1)
BUN SERPL-MCNC: 18 MG/DL (ref 6–20)
BUN/CREAT SERPL: 16 (ref 12–20)
CALCIUM SERPL-MCNC: 8.2 MG/DL (ref 8.5–10.1)
CALCULATED R AXIS, ECG10: -12 DEGREES
CALCULATED T AXIS, ECG11: 6 DEGREES
CHLORIDE SERPL-SCNC: 111 MMOL/L (ref 97–108)
CO2 SERPL-SCNC: 23 MMOL/L (ref 21–32)
CREAT SERPL-MCNC: 1.12 MG/DL (ref 0.7–1.3)
DIAGNOSIS, 93000: NORMAL
DIFFERENTIAL METHOD BLD: ABNORMAL
EOSINOPHIL # BLD: 0.1 K/UL (ref 0–0.4)
EOSINOPHIL NFR BLD: 1 % (ref 0–7)
ERYTHROCYTE [DISTWIDTH] IN BLOOD BY AUTOMATED COUNT: 17.4 % (ref 11.5–14.5)
GLOBULIN SER CALC-MCNC: 3.7 G/DL (ref 2–4)
GLUCOSE SERPL-MCNC: 101 MG/DL (ref 65–100)
HCT VFR BLD AUTO: 28.1 % (ref 36.6–50.3)
HGB BLD-MCNC: 8.9 G/DL (ref 12.1–17)
IMM GRANULOCYTES # BLD AUTO: 0 K/UL (ref 0–0.04)
IMM GRANULOCYTES NFR BLD AUTO: 1 % (ref 0–0.5)
INR PPP: 1.3 (ref 0.9–1.1)
LYMPHOCYTES # BLD: 0.9 K/UL (ref 0.8–3.5)
LYMPHOCYTES NFR BLD: 12 % (ref 12–49)
MAGNESIUM SERPL-MCNC: 2 MG/DL (ref 1.6–2.4)
MCH RBC QN AUTO: 27 PG (ref 26–34)
MCHC RBC AUTO-ENTMCNC: 31.7 G/DL (ref 30–36.5)
MCV RBC AUTO: 85.2 FL (ref 80–99)
MONOCYTES # BLD: 0.9 K/UL (ref 0–1)
MONOCYTES NFR BLD: 12 % (ref 5–13)
NEUTS SEG # BLD: 5.4 K/UL (ref 1.8–8)
NEUTS SEG NFR BLD: 74 % (ref 32–75)
NRBC # BLD: 0 K/UL (ref 0–0.01)
NRBC BLD-RTO: 0 PER 100 WBC
PLATELET # BLD AUTO: 150 K/UL (ref 150–400)
PMV BLD AUTO: 10.1 FL (ref 8.9–12.9)
POTASSIUM SERPL-SCNC: 4.2 MMOL/L (ref 3.5–5.1)
PROT SERPL-MCNC: 5.7 G/DL (ref 6.4–8.2)
PROTHROMBIN TIME: 13.5 SEC (ref 9–11.1)
Q-T INTERVAL, ECG07: 384 MS
QRS DURATION, ECG06: 140 MS
QTC CALCULATION (BEZET), ECG08: 495 MS
RBC # BLD AUTO: 3.3 M/UL (ref 4.1–5.7)
SODIUM SERPL-SCNC: 138 MMOL/L (ref 136–145)
TSH SERPL DL<=0.05 MIU/L-ACNC: 1.31 UIU/ML (ref 0.36–3.74)
VENTRICULAR RATE, ECG03: 100 BPM
WBC # BLD AUTO: 7.3 K/UL (ref 4.1–11.1)

## 2022-09-24 PROCEDURE — 74011000258 HC RX REV CODE- 258: Performed by: INTERNAL MEDICINE

## 2022-09-24 PROCEDURE — 93005 ELECTROCARDIOGRAM TRACING: CPT

## 2022-09-24 PROCEDURE — 77030019905 HC CATH URETH INTMIT MDII -A

## 2022-09-24 PROCEDURE — 51798 US URINE CAPACITY MEASURE: CPT

## 2022-09-24 PROCEDURE — 74011250637 HC RX REV CODE- 250/637: Performed by: ORTHOPAEDIC SURGERY

## 2022-09-24 PROCEDURE — 77030005513 HC CATH URETH FOL11 MDII -B

## 2022-09-24 PROCEDURE — 74011250637 HC RX REV CODE- 250/637: Performed by: PHYSICIAN ASSISTANT

## 2022-09-24 PROCEDURE — 74011250637 HC RX REV CODE- 250/637: Performed by: FAMILY MEDICINE

## 2022-09-24 PROCEDURE — 83735 ASSAY OF MAGNESIUM: CPT

## 2022-09-24 PROCEDURE — 84443 ASSAY THYROID STIM HORMONE: CPT

## 2022-09-24 PROCEDURE — 74011250637 HC RX REV CODE- 250/637

## 2022-09-24 PROCEDURE — 85610 PROTHROMBIN TIME: CPT

## 2022-09-24 PROCEDURE — 74011000250 HC RX REV CODE- 250: Performed by: PHYSICIAN ASSISTANT

## 2022-09-24 PROCEDURE — 74011250636 HC RX REV CODE- 250/636: Performed by: PHYSICIAN ASSISTANT

## 2022-09-24 PROCEDURE — 36415 COLL VENOUS BLD VENIPUNCTURE: CPT

## 2022-09-24 PROCEDURE — 94760 N-INVAS EAR/PLS OXIMETRY 1: CPT

## 2022-09-24 PROCEDURE — 74011250637 HC RX REV CODE- 250/637: Performed by: STUDENT IN AN ORGANIZED HEALTH CARE EDUCATION/TRAINING PROGRAM

## 2022-09-24 PROCEDURE — 80053 COMPREHEN METABOLIC PANEL: CPT

## 2022-09-24 PROCEDURE — 85025 COMPLETE CBC W/AUTO DIFF WBC: CPT

## 2022-09-24 PROCEDURE — 74011250636 HC RX REV CODE- 250/636: Performed by: INTERNAL MEDICINE

## 2022-09-24 PROCEDURE — 65270000046 HC RM TELEMETRY

## 2022-09-24 RX ORDER — METOPROLOL TARTRATE 25 MG/1
6.25 TABLET, FILM COATED ORAL EVERY 6 HOURS
Status: DISCONTINUED | OUTPATIENT
Start: 2022-09-24 | End: 2022-09-24

## 2022-09-24 RX ORDER — METOPROLOL TARTRATE 25 MG/1
12.5 TABLET, FILM COATED ORAL 3 TIMES DAILY
Status: DISCONTINUED | OUTPATIENT
Start: 2022-09-24 | End: 2022-09-25

## 2022-09-24 RX ORDER — TAMSULOSIN HYDROCHLORIDE 0.4 MG/1
0.4 CAPSULE ORAL DAILY
Status: DISCONTINUED | OUTPATIENT
Start: 2022-09-24 | End: 2022-09-30 | Stop reason: HOSPADM

## 2022-09-24 RX ORDER — METOPROLOL TARTRATE 5 MG/5ML
2.5 INJECTION INTRAVENOUS ONCE
Status: COMPLETED | OUTPATIENT
Start: 2022-09-24 | End: 2022-09-24

## 2022-09-24 RX ORDER — TRAMADOL HYDROCHLORIDE 50 MG/1
50 TABLET ORAL
Status: DISCONTINUED | OUTPATIENT
Start: 2022-09-24 | End: 2022-09-30 | Stop reason: HOSPADM

## 2022-09-24 RX ORDER — KETOROLAC TROMETHAMINE 30 MG/ML
15 INJECTION, SOLUTION INTRAMUSCULAR; INTRAVENOUS
Status: DISPENSED | OUTPATIENT
Start: 2022-09-24 | End: 2022-09-29

## 2022-09-24 RX ADMIN — ACETAMINOPHEN 500 MG: 325 TABLET, FILM COATED ORAL at 17:27

## 2022-09-24 RX ADMIN — CEFTRIAXONE SODIUM 2 G: 2 INJECTION, POWDER, FOR SOLUTION INTRAMUSCULAR; INTRAVENOUS at 00:57

## 2022-09-24 RX ADMIN — SODIUM CHLORIDE, PRESERVATIVE FREE 10 ML: 5 INJECTION INTRAVENOUS at 06:31

## 2022-09-24 RX ADMIN — TAMSULOSIN HYDROCHLORIDE 0.4 MG: 0.4 CAPSULE ORAL at 09:12

## 2022-09-24 RX ADMIN — POLYETHYLENE GLYCOL 3350 17 G: 17 POWDER, FOR SOLUTION ORAL at 09:23

## 2022-09-24 RX ADMIN — WARFARIN SODIUM 7 MG: 5 TABLET ORAL at 17:33

## 2022-09-24 RX ADMIN — METOPROLOL TARTRATE 6.25 MG: 25 TABLET, FILM COATED ORAL at 10:46

## 2022-09-24 RX ADMIN — METOPROLOL TARTRATE 2.5 MG: 5 INJECTION, SOLUTION INTRAVENOUS at 09:15

## 2022-09-24 RX ADMIN — ACETAMINOPHEN 500 MG: 325 TABLET, FILM COATED ORAL at 09:25

## 2022-09-24 RX ADMIN — SENNOSIDES AND DOCUSATE SODIUM 1 TABLET: 50; 8.6 TABLET ORAL at 09:12

## 2022-09-24 RX ADMIN — METOPROLOL TARTRATE 12.5 MG: 25 TABLET, FILM COATED ORAL at 21:14

## 2022-09-24 RX ADMIN — SENNOSIDES AND DOCUSATE SODIUM 1 TABLET: 50; 8.6 TABLET ORAL at 17:27

## 2022-09-24 RX ADMIN — ACETAMINOPHEN 500 MG: 325 TABLET, FILM COATED ORAL at 21:13

## 2022-09-24 RX ADMIN — FUROSEMIDE 20 MG: 20 TABLET ORAL at 09:12

## 2022-09-24 RX ADMIN — FUROSEMIDE 10 MG: 20 TABLET ORAL at 21:14

## 2022-09-24 RX ADMIN — METOPROLOL TARTRATE 12.5 MG: 25 TABLET, FILM COATED ORAL at 17:27

## 2022-09-24 RX ADMIN — SODIUM CHLORIDE, PRESERVATIVE FREE 10 ML: 5 INJECTION INTRAVENOUS at 13:15

## 2022-09-24 RX ADMIN — SODIUM CHLORIDE, PRESERVATIVE FREE 10 ML: 5 INJECTION INTRAVENOUS at 21:15

## 2022-09-24 RX ADMIN — DAPTOMYCIN 500 MG: 500 INJECTION, POWDER, LYOPHILIZED, FOR SOLUTION INTRAVENOUS at 19:43

## 2022-09-24 RX ADMIN — ACETAMINOPHEN 500 MG: 325 TABLET, FILM COATED ORAL at 06:30

## 2022-09-24 NOTE — PROGRESS NOTES
Pharmacist Note - Warfarin Dosing  Consult provided for this 89 y.o.male to manage warfarin for h/o DVT/afib, now s/p R knee I&D w/poly exchange    INR Goal: 2 - 3  Risk factors: Age > 65  Daily INR ordered: YES    Recent Labs     09/24/22  0410 09/24/22  0132 09/23/22  0105 09/22/22  1733 09/22/22  0258   HGB 8.9*  --   --   --  9.3*   INR  --  1.3* 1.3* 1.3*  --      Date               INR                  Dose  9/21   1.8, 1.2 5 mg  9/22   1.3        5 mg  9/23   1.3        5 mg  9/24                1.3                   7 mg                                                                                               Assessment/ Plan: Will order warfarin 7 mg PO x 1 dose. Pharmacy will continue to monitor daily and adjust therapy as indicated. Please contact the pharmacist at  for outpatient recommendations if needed.

## 2022-09-24 NOTE — PROGRESS NOTES
Ortho Daily Progress Note    Date of Surgery:  2022      Patient: Sami Abbasi   YOB: 1928  Age: 80 y.o. SUBJECTIVE:   3 Days Post-Op following RIGHT KNEE INCISION AND DRAINAGE WITH POLYETHYLENE EXCHANGE (SPINAL/IV SED)    The patient states moderate knee pain this morning, otherwise without C/O. Patient with episode of Afib overnight. The patient denies CP, SOB, N/V.     OBJECTIVE:     Vital Signs:    Temp (24hrs), Av.6 °F (37 °C), Min:97.9 °F (36.6 °C), Max:99.5 °F (37.5 °C)    Patient Vitals for the past 24 hrs:   BP Temp Pulse Resp SpO2   22 0915 106/68 -- (!) 109 -- --   22 0834 120/72 99.5 °F (37.5 °C) (!) 128 18 95 %   22 0805 (!) 137/92 98 °F (36.7 °C) 100 16 94 %   22 0551 -- -- 70 -- --   22 0324 118/74 98.4 °F (36.9 °C) (!) 102 16 94 %   22 0314 -- -- (!) 113 -- --   22 0116 -- -- (!) 128 -- --   22 0059 (!) 150/91 97.9 °F (36.6 °C) (!) 119 15 94 %   22 (!) 144/76 98.7 °F (37.1 °C) 90 16 95 %   22 1640 (!) 150/82 99.1 °F (37.3 °C) (!) 112 -- 96 %           Physical Exam:  General: A&Ox3, NAD. Respiratory: Respirations are unlabored. Abdomen: S/NT  Surgical site: C,D and I.  Musculoskeletal: Calves are S/NT, dorsiflexion/plantar flexion/EHL intact, Mehran DP 2+  Neurological:  Mehran LE's NVI    Laboratory Values:             Recent Labs     22  0410 22  0132   HGB 8.9*  --      --    INR  --  1.3*   CREA 1.12  --    *  --      Lab Results   Component Value Date/Time    Sodium 138 2022 04:10 AM    Potassium 4.2 2022 04:10 AM    Chloride 111 (H) 2022 04:10 AM    CO2 23 2022 04:10 AM    Glucose 101 (H) 2022 04:10 AM    BUN 18 2022 04:10 AM    Creatinine 1.12 2022 04:10 AM    Calcium 8.2 (L) 2022 04:10 AM         PLAN:     S/P RIGHT KNEE INCISION AND DRAINAGE WITH POLYETHYLENE EXCHANGE (SPINAL/IV SED) WBAT.  Mobilize with PT/nursing until discharged. Continue IV abx per ID. Cardiology consult for Afib. Hemodynamics Stable. Will continue to monitor. Wound Dressing changes PRN if saturated. Post Operative Pain PO pain meds for pain control. DVT Prophylaxis Coumadin pharmacy dosing for DVT prophylaxis. SCD's, encourage bed exercises, mobilize. Discharge Disposition Will focus on pain control and mobilizing with PT/nursing. Cardiology consult initiated. Case Management for discharge planning. Will continue to follow progress closely.            Signed By:     Hilton Cruz PA-C  Physician Assistant  [id number] [facility]  Mobile [phone]  Office [phone]     September 24, 2022 9:27 AM

## 2022-09-24 NOTE — CONSULTS
I spoke with patient, his sister, his nurse  His cardiologist is Dr Nnamdi Lima  His nurse will call [facility]

## 2022-09-24 NOTE — PROGRESS NOTES
[facility] [facility]                                                                                          Hospitalist Progress Note  Johnson Howard PA-C  Answering service: [phone] from in house phone        Date of Service:  2022  NAME:  Geovanna Schmidt  :  1928  MRN:  [id number]      Admission Summary:   Geovanna Schmidt is a 80 y.o. 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 Interval history / Subjective:   I was paged to Mr. Gutierrez [facility] room at 7337 as tachycardic to 130's and A-Fib on monitor. On arrival he denies any chest pain, SOB, abdominal pain, lightheadedness, or dizziness. We discussed his previous history with A fib and he is adamant he was not on medications for this. I reviewed his telemetry and previous EKG consistent with A-Fib with RVR. Due to his age, I started with low dose Metoprolol 2.5mg IV to bring down his rates. Prior to administering the metoprolol his blood pressure was checked and his SBP was > 110. He then received Metoprolol 2.5mg IV at 0918 with excellent response, bring HR's down to 90-low 100's. He remained HDS with this intervention. I then ordered him Metoprolol 6.25mg q6h as chaser, discussed with RN importance of chaser as if does not receive his RVR will likely recur. I also placed a consult to cardiology due to history of A-Fib with RVR previously requiring cardioversion.       Assessment & Plan:     Tachycardia  A-Fib with RVR post-operatively  HFrEF  -- Received Metoprolol 2.5mg IV  for RVR which controlled rates. Start Metoprolol 6.25mg q6h as chaser, if stable and controlling rates can change to XL dosing 9/25.  -- Goal K > 4.0, Mg > 2.0  -- On anticoagulation with Warfarin  -- TSH 1.31  -- Appreciate Cardiology consult. -- Continue LAsix 20/10mg. Does not appear hyper-or-hypovolemic driving this current A-Fib with RVR    #R knee arthroplasty infection  -s/p I&D  -abx dapto and ceftriaxone per primary team and ID     #Urinary Retention  -PVR with 600cc, then 563cc, confirmed by straight cath  -start flomax,place ag. Continue for now    Code status: To be confirmed by primary team  Prophylaxis: Per primary team  Care Plan discussed with: Pt, RN, daughter  Anticipated Disposition: Per primary team     Hospital Problems  Date Reviewed: 8/25/2022            Codes Class Noted POA    Infection of total knee replacement, initial encounter [facility]) ICD-10-CM: T84.59XA, Z96.659  ICD-9-CM: 996.66, V43.65  9/21/2022 Yes             Review of Systems:   A comprehensive review of systems was negative except for that written in the HPI. Vital Signs:    Last 24hrs VS reviewed since prior progress note. Most recent are:  Visit Vitals  BP (!) 106/50 (BP 1 Location: Left upper arm)   Pulse (!) 118   Temp 99.5 °F (37.5 °C)   Resp 18   Ht 5' 8\" (1.727 m)   Wt 85.3 kg (188 lb)   SpO2 95%   BMI 28.59 kg/m²         Intake/Output Summary (Last 24 hours) at 9/24/2022 1003  Last data filed at 9/24/2022 0703  Gross per 24 hour   Intake 50 ml   Output 1150 ml   Net -1100 ml        Physical Examination:     I had a face to face encounter with this patient and independently examined them on 9/24/2022 as outlined below:          Constitutional:  No acute distress, cooperative, pleasant    ENT:  Oral mucosa moist, oropharynx benign. Resp:  CTA bilaterally. No wheezing/rhonchi/rales. No accessory muscle use. CV:  Irregularly irregular. Tachycardic. No murmur apprecuated    GI:  Soft, non distended, non tender. Musculoskeletal:  No edema, warm. Palpable DP pulses bilaterally    Neurologic:  Moves all extremities.   AAOx3, CN II-XII reviewed and no deficits  : Webb with clear/yellow urine            Data Review:    Review and/or order of clinical lab test  Review and/or order of tests in the radiology section of CPT  Review and/or order of tests in the medicine section of CPT      Labs:     Recent Labs     09/24/22  0410 09/22/22  0258   WBC 7.3  --    HGB 8.9* 9.3*   HCT 28.1*  --      --      Recent Labs     09/24/22  0410 09/22/22  0258    142   K 4.2 4.4   * 110*   CO2 23 28   BUN 18 27*   CREA 1.12 1.28   * 89   CA 8.2* 8.4*   MG 2.0  --      Recent Labs     09/24/22  0410   ALT 10*   AP 79   TBILI 0.4   TP 5.7*   ALB 2.0*   GLOB 3.7     Recent Labs     09/24/22  0132 09/23/22  0105 09/22/22  1733   INR 1.3* 1.3* 1.3*   PTP 13.5* 13.5* 13.1*     TSH 1.31    Lab Results   Component Value Date/Time    Color YELLOW/STRAW 09/16/2022 02:50 PM    Appearance CLOUDY (A) 09/16/2022 02:50 PM    Specific gravity 1.020 09/16/2022 02:50 PM    pH (UA) 7.0 09/16/2022 02:50 PM    Protein Negative 09/16/2022 02:50 PM    Glucose Negative 09/16/2022 02:50 PM    Ketone Negative 09/16/2022 02:50 PM    Bilirubin Negative 09/16/2022 02:50 PM    Urobilinogen 1.0 09/16/2022 02:50 PM    Nitrites Positive (A) 09/16/2022 02:50 PM    Leukocyte Esterase MODERATE (A) 09/16/2022 02:50 PM    Epithelial cells FEW 09/16/2022 02:50 PM    Bacteria 4+ (A) 09/16/2022 02:50 PM    WBC 10-20 09/16/2022 02:50 PM    RBC 0-5 09/16/2022 02:50 PM         Medications Reviewed:     Current Facility-Administered Medications   Medication Dose Route Frequency    tamsulosin (FLOMAX) capsule 0.4 mg  0.4 mg Oral DAILY    warfarin (COUMADIN) tablet 7 mg  7 mg Oral ONCE    metoprolol tartrate (LOPRESSOR) tablet 6.25 mg  6.25 mg Oral Q6H    furosemide (LASIX) tablet 10 mg  10 mg Oral QHS    furosemide (LASIX) tablet 20 mg  20 mg Oral DAILY DAPTOmycin (CUBICIN) 500 mg in 0.9% sodium chloride 50 mL IVPB RF formulation  500 mg IntraVENous QPM    [Held by provider] atorvastatin (LIPITOR) tablet 20 mg  20 mg Oral QHS    sodium chloride (NS) flush 5-40 mL  5-40 mL IntraVENous Q8H    sodium chloride (NS) flush 5-40 mL  5-40 mL IntraVENous PRN    acetaminophen (TYLENOL) tablet 500 mg  500 mg Oral Q4HWA    oxyCODONE IR (ROXICODONE) tablet 2.5 mg  2.5 mg Oral Q3H PRN    oxyCODONE IR (ROXICODONE) tablet 5 mg  5 mg Oral Q3H PRN    naloxone (NARCAN) injection 0.4 mg  0.4 mg IntraVENous PRN    hydrOXYzine HCL (ATARAX) tablet 10 mg  10 mg Oral Q8H PRN    senna-docusate (PERICOLACE) 8.6-50 mg per tablet 1 Tablet  1 Tablet Oral BID    polyethylene glycol (MIRALAX) packet 17 g  17 g Oral DAILY    bisacodyL (DULCOLAX) suppository 10 mg  10 mg Rectal DAILY PRN    cefTRIAXone (ROCEPHIN) 2 g in 0.9% sodium chloride 20 mL IV syringe  2 g IntraVENous Q24H    Warfarin- pharmacy to dose   Other Rx Dosing/Monitoring     ______________________________________________________________________  EXPECTED LENGTH OF STAY: 2d 14h  ACTUAL LENGTH OF STAY:          3                 Johnson Howard PA-C

## 2022-09-24 NOTE — CONSULTS
[address] [facility] Cardiology Consultation    Date of Consult:  09/24/22  Date of Admission: 9/21/2022  Primary Cardiologist: Dr. Tegan Arteaga    Assessment/Recommendations:  Paroxysmal AF with RVR - prior cardioversion in 2020  - was in AF during preop testing on 9/16  - continue rate control strategy for now. Metoprolol 12.5 mg TID for rate control.  - on warfarin  - continue telemetry monitoring    2. H/o cardiomyopathy - LVEF reduced to 30-35% previously but recovered to 60-65% in 2020  - appears euvolemic currently  - continue home lasix    3. S/p right TKR I&D  - on antibiotics per ID    4. H/o recurrent DVT  - home warfarin has been restarted      Thank you for the opportunity to participate in the care of Edie Resendiz and please do not hesitate to contact us should you have any questions. Chief Complaint / Reason for Consult:   AF    History of Present Illness:  Edie Resendiz is a 80 y.o. male with the below listed medical history who was admitted after revision of right TKR due to infection. We are consulted for AF with RVR. He was in AF on preop ECG on 9/16. He underwent right knee revision and drainage. Noted to have AF with RVR this morning and started on metoprolol. He denies chest pain, dyspnea, palpitations, lightheadedness or other complaints. No orthopnea or PND. He has h/o AF and was cardioverted in 2020. LVEF was noted to be low at the time but recovered to normal per echo in 6/2020.  He has been on warfarin chronically for h/o recurrent DVTs    Past Medical History:   Diagnosis Date    Arthritis     DJD    Cancer ([facility] [address]     prostate    Cancer ([facility] [address]     SKIN CA RIGHT EYEBROW AREA    Chronic pain     History of colonoscopy with polypectomy 2005~    Hypercholesterolemia     Macular degeneration     Other ill-defined conditions(799.74)     cataracts    PAF (paroxysmal atrial fibrillation) (HCC)     Thromboembolus (HCC)     3 x left leg, 1 x right leg 1990'S       Prior to Admission medications Medication Sig Start Date End Date Taking? Authorizing Provider   acetaminophen (TYLENOL) 500 mg tablet Take 1 Tablet by mouth every four (4) hours (while awake). 9/23/22  Yes Fredis Ponce NP   oxyCODONE IR (ROXICODONE) 5 mg immediate release tablet Take 0.5-1 Tablets by mouth every four (4) hours as needed for Pain for up to 7 days. Max Daily Amount: 30 mg. 9/23/22 9/30/22 Yes Fredis Ponce NP   senna-docusate (PERICOLACE) 8.6-50 mg per tablet Take 1 Tablet by mouth two (2) times a day for 30 days. 9/23/22 10/23/22 Yes Fredis Ponce NP   polyethylene glycol (MIRALAX) 17 gram packet Take 1 Packet by mouth daily for 14 days. 9/23/22 10/7/22 Yes Fredis Ponce NP   cefTRIAXone 2 gram 2 g IV syringe 2 g by IntraVENous route every twenty-four (24) hours for 40 days. 9/24/22 11/3/22 Yes Fredis Ponce NP   warfarin (COUMADIN) 5 mg tablet Take 5 mg by mouth every evening. Yes Provider, Historical   acetaminophen (TYLENOL) 500 mg tablet Take 1 Tab by mouth every four (4) hours. Patient taking differently: Take 1,000 mg by mouth every four (4) hours. 4/21/21  Yes Vy Nguyễn PA-C   furosemide (LASIX) 40 mg tablet Take 40 mg by mouth daily. Yes Provider, Historical   furosemide (LASIX) 20 mg tablet Take  by mouth nightly. Yes Provider, Historical   atorvastatin (LIPITOR) 20 mg tablet Take 1 Tab by mouth daily. Indications: high cholesterol 2/17/20  Yes Nate Dunn MD   vit C/E/Zn/coppr/lutein/zeaxan (PRESERVISION AREDS-2 PO) Take 1 Tablet by mouth two (2) times a day. Yes Other, MD Clement   latanoprost (XALATAN) 0.005 % ophthalmic solution Administer 1 Drop to both eyes nightly. Yes Other, MD Clement   cholecalciferol (VITAMIN D3) (1000 Units /25 mcg) tablet Take 1,000 Units by mouth daily. Yes Other, MD Clement   celecoxib (CELEBREX) 200 mg capsule Take 200 mg by mouth daily.     Provider, Historical       Current Facility-Administered Medications   Medication Dose Route Frequency tamsulosin (FLOMAX) capsule 0.4 mg  0.4 mg Oral DAILY    warfarin (COUMADIN) tablet 7 mg  7 mg Oral ONCE    metoprolol tartrate (LOPRESSOR) tablet 6.25 mg  6.25 mg Oral Q6H    furosemide (LASIX) tablet 10 mg  10 mg Oral QHS    furosemide (LASIX) tablet 20 mg  20 mg Oral DAILY    DAPTOmycin (CUBICIN) 500 mg in 0.9% sodium chloride 50 mL IVPB RF formulation  500 mg IntraVENous QPM    [Held by provider] atorvastatin (LIPITOR) tablet 20 mg  20 mg Oral QHS    sodium chloride (NS) flush 5-40 mL  5-40 mL IntraVENous Q8H    sodium chloride (NS) flush 5-40 mL  5-40 mL IntraVENous PRN    acetaminophen (TYLENOL) tablet 500 mg  500 mg Oral Q4HWA    oxyCODONE IR (ROXICODONE) tablet 2.5 mg  2.5 mg Oral Q3H PRN    oxyCODONE IR (ROXICODONE) tablet 5 mg  5 mg Oral Q3H PRN    naloxone (NARCAN) injection 0.4 mg  0.4 mg IntraVENous PRN    hydrOXYzine HCL (ATARAX) tablet 10 mg  10 mg Oral Q8H PRN    senna-docusate (PERICOLACE) 8.6-50 mg per tablet 1 Tablet  1 Tablet Oral BID    polyethylene glycol (MIRALAX) packet 17 g  17 g Oral DAILY    bisacodyL (DULCOLAX) suppository 10 mg  10 mg Rectal DAILY PRN    cefTRIAXone (ROCEPHIN) 2 g in 0.9% sodium chloride 20 mL IV syringe  2 g IntraVENous Q24H    Warfarin- pharmacy to dose   Other Rx Dosing/Monitoring       Family History   Problem Relation Age of Onset    Cancer Mother         STOMACH    Cancer Father         PROSTATE    Heart Disease Brother         MI    Anesth Problems Neg Hx        Social History     Socioeconomic History    Marital status:      Spouse name: Not on file    Number of children: Not on file    Years of education: Not on file    Highest education level: Not on file   Occupational History    Not on file   Tobacco Use    Smoking status: Former     Types: Cigars     Quit date: 2012     Years since quitting: 10.7    Smokeless tobacco: Never    Tobacco comments:     quit 15 yrs ago   Vaping Use    Vaping Use: Never used   Substance and Sexual Activity    Alcohol use: Not Currently    Drug use: No    Sexual activity: Not Currently   Other Topics Concern    Not on file   Social History Narrative    Not on file     Social Determinants of Health     Financial Resource Strain: Not on file   Food Insecurity: Not on file   Transportation Needs: Not on file   Physical Activity: Not on file   Stress: Not on file   Social Connections: Not on file   Intimate Partner Violence: Not on file   Housing Stability: Not on file       ROS  ROS: All other systems reviewed and were negative other than mentioned above.      Visit Vitals  /61   Pulse (!) 109   Temp 99.5 °F (37.5 °C)   Resp 18   Ht 5' 8\" (1.727 m)   Wt 85.3 kg (188 lb)   SpO2 95%   BMI 28.59 kg/m²         Intake/Output Summary (Last 24 hours) at 9/24/2022 1205  Last data filed at 9/24/2022 0703  Gross per 24 hour   Intake 50 ml   Output 1150 ml   Net -1100 ml        General: resting comfortably in no distress, elderly  HEENT: sclera anicteric, moist mucous membranes  Neck: supple, no JVD or HJR  CV: irregular rate and rhythm, distant heart sounds  Lungs: no respiratory distress, diminished BS bilateral bases  Abdomen: soft, non distended, non tender  MSK: dressing over right knee, right knee swelling., no left lower extremity edema  Skin: warm to touch  Neuro: alert and oriented, answered questions appropriately  Psych: normal mood and affect     Lab Review:  BMP:   Lab Results   Component Value Date/Time     09/24/2022 04:10 AM    K 4.2 09/24/2022 04:10 AM     (H) 09/24/2022 04:10 AM    CO2 23 09/24/2022 04:10 AM    AGAP 4 (L) 09/24/2022 04:10 AM     (H) 09/24/2022 04:10 AM    BUN 18 09/24/2022 04:10 AM    CREA 1.12 09/24/2022 04:10 AM    GFRAA >60 09/24/2022 04:10 AM    GFRNA >60 09/24/2022 04:10 AM        CBC:  Lab Results   Component Value Date/Time    WBC 7.3 09/24/2022 04:10 AM    HGB 8.9 (L) 09/24/2022 04:10 AM    HCT 28.1 (L) 09/24/2022 04:10 AM    PLATELET 301 39/27/4154 04:10 AM    MCV 85.2 09/24/2022 04:10 AM       All Cardiac Markers in the last 24 hours:    Lab Results   Component Value Date/Time    CPK 72 09/23/2022 04:11 PM       No results found for: CHOL, CHOLPOCT, CHOLX, CHLST, CHOLV, HDL, HDLPOC, HDLP, LDL, LDLCPOC, LDLC, DLDLP, VLDLC, VLDL, TGLX, TRIGL, TRIGP, TGLPOCT, CHHD, CHHDX     Data Review:  ECG tracing personally reviewed: AF with intermittent RBBB, NSSTT changes unchanged from 9/16/22    Echocardiogram:   Echo 6/2020  Left ventricular cavity size and function appears to be normal. Ejection fraction is estimated to be 60-65%. AoSclerosis w/o stenosis w/ trivial to mild AI. Mild MR. Trivial TR.  RVSP 30mmHg.    02/14/20    ECHO AUBREY W POSSIBLE CARDIOVERSION 02/17/2020 2/17/2020    Interpretation Summary  · Normal cavity size and wall thickness. Moderate-to-severe systolic dysfunction. Estimated left ventricular ejection fraction is 30 - 35%. Visually measured ejection fraction. Left ventricular global hypokinesis. · Mildly dilated left atrium. · Mildly dilated right ventricle. Mildly reduced systolic function. · Mildly dilated right atrium. · Agitated saline contrast study was performed and color flow Doppler was used. · Aortic valve sclerosis. · Mitral valve non-specific thickening. Trace mitral valve regurgitation is present. Signed by: Diamond Anton III, DO on 2/17/2020 10:16 AM          Signed:  Shaq Storm, [facility]  09/24/22

## 2022-09-24 NOTE — PROGRESS NOTES
Paged on call physician for dr. Mason John for patient's NEWS score 3. Waiting for Fernie Salma Yane call back.

## 2022-09-24 NOTE — CONSULTS
Consult    Patient: Mehdi Brannon MRN: [id number]  SSN: [id number]    YOB: 1928  Age: 80 y.o. Sex: male      Subjective:      Mehdi Brannon is a 80 y.o. male with a phx prostate cander, dyslipdemia, pAF, past DVT, who is being seen for tachycardia. He was admitted for R total knee replacement infection, and underwent irrigation and debridement on 9/21/22. He has been on daptomycin, and ceftriaxone. This evening, his HR was elevated to 100-140, and we were called for consultation. After consult, it was noted that patient had a distended bladder. Straight cath revealed >600cc urine in the bladder. His HR improved to the low 100's after this. He denies sx.     Past Medical History:   Diagnosis Date    Arthritis     DJD    Cancer ([facility] [address]     prostate    Cancer ([facility] [address]     SKIN CA RIGHT EYEBROW AREA    Chronic pain     History of colonoscopy with polypectomy 2005~    Hypercholesterolemia     Macular degeneration     Other ill-defined conditions(799.89)     cataracts    PAF (paroxysmal atrial fibrillation) (HCC)     Thromboembolus (HCC)     3 x left leg, 1 x right leg 1990'S     Past Surgical History:   Procedure Laterality Date    HX CATARACT REMOVAL Bilateral     HX KNEE REPLACEMENT Left     HX MOHS PROCEDURES  1995    right    HX OTHER SURGICAL  1996    [place] filter insertion    HX PROSTATECTOMY  age 72    HX ROTATOR CUFF REPAIR Right     HX TONSILLECTOMY      MO LIGMT REVISION,KNEE,EXTRA-ARTIC Right 04/2021    MO TOTAL HIP ARTHROPLASTY      left    MO TOTAL KNEE ARTHROPLASTY      right    VASCULAR SURGERY PROCEDURE UNLIST      1996 IVC FILTER      Family History   Problem Relation Age of Onset    Cancer Mother         STOMACH    Cancer Father         PROSTATE    Heart Disease Brother         MI    Anesth Problems Neg Hx      Social History     Tobacco Use    Smoking status: Former     Types: Cigars     Quit date: 2012     Years since quitting: 10.7    Smokeless tobacco: Never Tobacco comments:     quit 15 yrs ago   Substance Use Topics    Alcohol use: Not Currently      Current Facility-Administered Medications   Medication Dose Route Frequency Provider Last Rate Last Admin    furosemide (LASIX) tablet 10 mg  10 mg Oral QHS Emakirti Rosen NP   10 mg at 09/23/22 2033    furosemide (LASIX) tablet 20 mg  20 mg Oral DAILY Emaline KYLE Rosen        DAPTOmycin (CUBICIN) 500 mg in 0.9% sodium chloride 50 mL IVPB RF formulation  500 mg IntraVENous QPM Deshawn Dejesus  mL/hr at 09/23/22 1533 500 mg at 09/23/22 1533    [Held by provider] atorvastatin (LIPITOR) tablet 20 mg  20 mg Oral QHS Vy Nguyễn PA-C   20 mg at 09/22/22 2200    sodium chloride (NS) flush 5-40 mL  5-40 mL IntraVENous Q8H Vy Nguyễn PA-C   10 mL at 09/23/22 2037    sodium chloride (NS) flush 5-40 mL  5-40 mL IntraVENous PRN Karlee MARTIN PA-C        acetaminophen (TYLENOL) tablet 500 mg  500 mg Oral Q4HWA Vy Nguyễn PA-C   500 mg at 09/23/22 2032    oxyCODONE IR (ROXICODONE) tablet 2.5 mg  2.5 mg Oral Q3H PRN Karlee MARTIN PA-C   2.5 mg at 09/23/22 3209    oxyCODONE IR (ROXICODONE) tablet 5 mg  5 mg Oral Q3H PRN Karlee MARTIN PA-C   5 mg at 09/23/22 2033    naloxone [facility]) injection 0.4 mg  0.4 mg IntraVENous PRN Karlee MARTIN PA-C        hydrOXYzine HCL (ATARAX) tablet 10 mg  10 mg Oral Q8H PRN Vy Nguyễn PA-C        senna-docusate (PERICOLACE) 8.6-50 mg per tablet 1 Tablet  1 Tablet Oral BID Karlee MARTIN PA-C   1 Tablet at 09/23/22 1729    polyethylene glycol (MIRALAX) packet 17 g  17 g Oral DAILY Karlee MARTIN PA-C   17 g at 09/23/22 0954    bisacodyL (DULCOLAX) suppository 10 mg  10 mg Rectal DAILY PRN Karlee MARTIN PA-C        cefTRIAXone (ROCEPHIN) 2 g in 0.9% sodium chloride 20 mL IV syringe  2 g IntraVENous Q24H Vy Nguyễn PA-C   2 g at 09/24/22 0057    Warfarin- pharmacy to dose Other Rx Dosing/Monitoring Vy Nguyễn PA-C            Allergies   Allergen Reactions    Tape [Adhesive] Other (comments)     REDNESS, SKIN TEARS - BANDAID, PAPER TAPE       Review of Systems:  Review of systems not obtained due to patient factors. Pt has some confused speech  Objective:     Vitals:    09/24/22 0116 09/24/22 0314 09/24/22 0324 09/24/22 0551   BP:   118/74    Pulse: (!) 128 (!) 113 (!) 102 70   Resp:   16    Temp:   98.4 °F (36.9 °C)    SpO2:   94%    Weight:       Height:            Physical Exam:  General:  Alert, cooperative, no distress, appears stated age. Eyes:  Conjunctivae/corneas clear. PERRL, EOMs intact. Fundi benign   Ears:  Normal TMs and external ear canals both ears. Nose: Nares normal. Septum midline. Mucosa normal. No drainage or sinus tenderness. Mouth/Throat: Lips, mucosa, and tongue normal. Teeth and gums normal.   Neck: Supple, symmetrical, trachea midline, no adenopathy, thyroid: no enlargment/tenderness/nodules, no carotid bruit and no JVD. Back:   Symmetric, no curvature. ROM normal. No CVA tenderness. Lungs:   Clear to auscultation bilaterally. Heart:  Irregularly-irregular,  S1, S2 normal, no murmur, click, rub or gallop. Abdomen:   Soft, non-tender. Bowel sounds normal. No masses,  No organomegaly. Extremities: Extremities normal, atraumatic, no cyanosis or edema. Pulses: 2+ radial pulses    Skin: Skin color, texture, turgor normal. No rashes or lesions   Lymph nodes: Cervical, supraclavicular, and axillary nodes normal.         Assessment:     Hospital Problems  Date Reviewed: 8/25/2022            Codes Class Noted POA    Infection of total knee replacement, initial encounter [facility]) ICD-10-CM: T84.59XA, Z96.659  ICD-9-CM: 996.66, V43.65  9/21/2022 Unknown           Plan:     #Paroxysmal Atrial Fibrillation  -stat EKG show atrial fibrillation consistent with known hx.   His ventricular response was significantly elevated, but improved after straight cath  -pharmacy is dosing warfarin  -check CBC, CMP, TSH, and Mg2+  -dialy diuresis with lasix, monitor fluid status and electrolytes    #R knee arthroplasty infection  -s/p I&D  -abx dapto and ceftriaxone per primary team and ID    #Urinary Retention  -PVR with 600cc, then 563cc, confirmed by straight cath  -start flomax,place ag      Signed By: Qiana Guerra MD     September 24, 2022

## 2022-09-25 LAB
INR PPP: 1.3 (ref 0.9–1.1)
PROTHROMBIN TIME: 13.1 SEC (ref 9–11.1)

## 2022-09-25 PROCEDURE — 65270000046 HC RM TELEMETRY

## 2022-09-25 PROCEDURE — 85610 PROTHROMBIN TIME: CPT

## 2022-09-25 PROCEDURE — 74011000250 HC RX REV CODE- 250: Performed by: PHYSICIAN ASSISTANT

## 2022-09-25 PROCEDURE — 74011250637 HC RX REV CODE- 250/637

## 2022-09-25 PROCEDURE — 74011250637 HC RX REV CODE- 250/637: Performed by: FAMILY MEDICINE

## 2022-09-25 PROCEDURE — 36415 COLL VENOUS BLD VENIPUNCTURE: CPT

## 2022-09-25 PROCEDURE — 74011250636 HC RX REV CODE- 250/636: Performed by: PHYSICIAN ASSISTANT

## 2022-09-25 PROCEDURE — 74011250637 HC RX REV CODE- 250/637: Performed by: STUDENT IN AN ORGANIZED HEALTH CARE EDUCATION/TRAINING PROGRAM

## 2022-09-25 PROCEDURE — 74011250637 HC RX REV CODE- 250/637: Performed by: ORTHOPAEDIC SURGERY

## 2022-09-25 PROCEDURE — 74011250636 HC RX REV CODE- 250/636: Performed by: INTERNAL MEDICINE

## 2022-09-25 PROCEDURE — 74011000258 HC RX REV CODE- 258: Performed by: INTERNAL MEDICINE

## 2022-09-25 PROCEDURE — 74011250637 HC RX REV CODE- 250/637: Performed by: PHYSICIAN ASSISTANT

## 2022-09-25 PROCEDURE — 94760 N-INVAS EAR/PLS OXIMETRY 1: CPT

## 2022-09-25 RX ORDER — WARFARIN 4 MG/1
8 TABLET ORAL ONCE
Status: COMPLETED | OUTPATIENT
Start: 2022-09-25 | End: 2022-09-25

## 2022-09-25 RX ORDER — METOPROLOL TARTRATE 25 MG/1
25 TABLET, FILM COATED ORAL 2 TIMES DAILY
Status: DISCONTINUED | OUTPATIENT
Start: 2022-09-25 | End: 2022-09-26

## 2022-09-25 RX ADMIN — WARFARIN SODIUM 8 MG: 4 TABLET ORAL at 12:08

## 2022-09-25 RX ADMIN — ACETAMINOPHEN 500 MG: 325 TABLET, FILM COATED ORAL at 18:45

## 2022-09-25 RX ADMIN — KETOROLAC TROMETHAMINE 15 MG: 30 INJECTION, SOLUTION INTRAMUSCULAR; INTRAVENOUS at 03:20

## 2022-09-25 RX ADMIN — TAMSULOSIN HYDROCHLORIDE 0.4 MG: 0.4 CAPSULE ORAL at 10:09

## 2022-09-25 RX ADMIN — FUROSEMIDE 20 MG: 20 TABLET ORAL at 10:09

## 2022-09-25 RX ADMIN — METOPROLOL TARTRATE 25 MG: 25 TABLET, FILM COATED ORAL at 10:09

## 2022-09-25 RX ADMIN — METOPROLOL TARTRATE 25 MG: 25 TABLET, FILM COATED ORAL at 18:45

## 2022-09-25 RX ADMIN — SODIUM CHLORIDE, PRESERVATIVE FREE 10 ML: 5 INJECTION INTRAVENOUS at 14:05

## 2022-09-25 RX ADMIN — SODIUM CHLORIDE, PRESERVATIVE FREE 10 ML: 5 INJECTION INTRAVENOUS at 23:43

## 2022-09-25 RX ADMIN — ACETAMINOPHEN 500 MG: 325 TABLET, FILM COATED ORAL at 14:04

## 2022-09-25 RX ADMIN — SENNOSIDES AND DOCUSATE SODIUM 1 TABLET: 50; 8.6 TABLET ORAL at 18:45

## 2022-09-25 RX ADMIN — FUROSEMIDE 10 MG: 20 TABLET ORAL at 22:49

## 2022-09-25 RX ADMIN — CEFTRIAXONE SODIUM 2 G: 2 INJECTION, POWDER, FOR SOLUTION INTRAMUSCULAR; INTRAVENOUS at 00:53

## 2022-09-25 RX ADMIN — CEFTRIAXONE SODIUM 2 G: 2 INJECTION, POWDER, FOR SOLUTION INTRAMUSCULAR; INTRAVENOUS at 23:43

## 2022-09-25 RX ADMIN — ACETAMINOPHEN 500 MG: 325 TABLET, FILM COATED ORAL at 05:44

## 2022-09-25 RX ADMIN — SODIUM CHLORIDE, PRESERVATIVE FREE 10 ML: 5 INJECTION INTRAVENOUS at 05:45

## 2022-09-25 RX ADMIN — ACETAMINOPHEN 500 MG: 325 TABLET, FILM COATED ORAL at 22:49

## 2022-09-25 RX ADMIN — ACETAMINOPHEN 500 MG: 325 TABLET, FILM COATED ORAL at 10:09

## 2022-09-25 RX ADMIN — DAPTOMYCIN 500 MG: 500 INJECTION, POWDER, LYOPHILIZED, FOR SOLUTION INTRAVENOUS at 18:38

## 2022-09-25 RX ADMIN — SENNOSIDES AND DOCUSATE SODIUM 1 TABLET: 50; 8.6 TABLET ORAL at 10:09

## 2022-09-25 NOTE — PROGRESS NOTES
Ortho Daily Progress Note    Date of Surgery:  2022      Patient: Ryanne Tamez   YOB: 1928  Age: 80 y.o. SUBJECTIVE:   4 Days Post-Op following RIGHT KNEE INCISION AND DRAINAGE WITH POLYETHYLENE EXCHANGE (SPINAL/IV SED)    The patient states moderate knee pain this morning, otherwise without complaint. Patient asking why PT is not working with him anymore. The patient denies CP, SOB, N/V.     OBJECTIVE:     Vital Signs:    Temp (24hrs), Av.1 °F (36.7 °C), Min:97.7 °F (36.5 °C), Max:98.7 °F (37.1 °C)    Patient Vitals for the past 24 hrs:   BP Temp Pulse Resp SpO2   22 0849 (!) 149/94 97.7 °F (36.5 °C) (!) 107 20 --   22 0352 -- -- 88 -- --   22 0210 -- -- 88 -- --   22 0135 (!) 144/76 97.8 °F (36.6 °C) 93 16 95 %   227 -- -- 79 -- --   22 2114 131/67 -- 81 -- --   22 2033 128/83 98 °F (36.7 °C) 79 16 96 %   22 -- -- 97 -- --   22 1800 -- -- 95 -- --   22 1727 119/62 -- (!) 104 -- --   22 1430 115/70 98.7 °F (37.1 °C) 99 18 95 %   22 1400 -- -- 93 -- --   22 1225 124/74 -- (!) 111 -- --   22 1200 -- -- (!) 122 -- --   22 1046 108/61 -- (!) 109 -- --   22 1000 -- -- (!) 104 -- --   22 0932 (!) 106/50 -- (!) 118 -- --           Physical Exam:  General: A&Ox3, NAD. Respiratory: Respirations are unlabored. Abdomen: S/NT  Surgical site: C,D and I.  Musculoskeletal: Moderate operative knee effusion.  Calves are S/NT, Mehran dorsi/plantar flexion/EHL intact, distal pulses intact  Neurological:  Mehran LE's NVI    Laboratory Values:             Recent Labs     22  0139 22  0410   HGB  --  8.9*   PLT  --  150   INR 1.3*  --    CREA  --  1.12   GLU  --  101*     Lab Results   Component Value Date/Time    Sodium 138 2022 04:10 AM    Potassium 4.2 2022 04:10 AM    Chloride 111 (H) 2022 04:10 AM    CO2 23 2022 04:10 AM    Glucose 101 (H) 2022 04:10 AM    BUN 18 09/24/2022 04:10 AM    Creatinine 1.12 09/24/2022 04:10 AM    Calcium 8.2 (L) 09/24/2022 04:10 AM         PLAN:     S/P RIGHT KNEE INCISION AND DRAINAGE WITH POLYETHYLENE EXCHANGE (SPINAL/IV SED)    INFECTED TOTAL KNEE REPLACEMENT    POST OP A-FIB W/RVR WBAT. Mobilize with PT/nursing until discharged. Antibiotics per ID, PICC line in place. Management per Hospitalist/Cardiology         Hemodynamics Stable. Wound Aquacell x 7 days unless saturated or loses integrity. Post Operative Pain Tramadol, Tylenol, toradol PRN. DVT Prophylaxis Coumadin pharmacy dosing, SCD's, encourage bed exercises, mobilize. Discharge Disposition Appreciate Hospitalist and Cardiology assistance. Case management for discharge planning. Continue current care. Will continue to follow.         Signed By:     Yan Parson PA-C  Physician Assistant  [id number]  Mobile [phone]  Office [phone]     September 25, 2022 9:21 AM

## 2022-09-25 NOTE — PROGRESS NOTES
Cardiology Progress Note  2022    Admit Date: 2022  Admit Diagnosis: Infection of total knee replacement, initial encounter ([facility].) [T84.59XA, Z96.659]  CC:  AF    Assessment/Recommendations:  Paroxysmal AF with RVR - prior cardioversion in   - was in AF during preop testing on   - continue rate control strategy for now. Switch to metoprolol 25 mg BID. - on warfarin  - continue telemetry monitoring     2. H/o cardiomyopathy - LVEF reduced to 30-35% previously but recovered to 60-65% in   - appears euvolemic currently  - continue home lasix     3. S/p right TKR I&D  - on antibiotics per ID     4. H/o recurrent DVT  - home warfarin has been restarted    OK for discharge from cardiac standpoint. Recommend follow up with Dr. Jackie Gurrola upon discharge    Thank you for this consult. We will continue to follow along. Please contact us for any further questions or concerns. Hospital problem list     Active Problems:    Infection of total knee replacement, initial encounter ([facility] (2022)                                                          Subjective:     Denies any complaints. HR well controlled. No dyspnea. ROS: All other systems reviewed and were negative other than mentioned above. No change in family and social history from H&P/Consult note.     Objective:    Physical Exam:  24 hr VS reviewed, overall VSSAF  Temp (24hrs), Av.5 °F (36.9 °C), Min:97.8 °F (36.6 °C), Max:99.5 °F (37.5 °C)    Patient Vitals for the past 8 hrs:   Pulse   22 0352 88   22 0210 88   22 0135 93    Patient Vitals for the past 8 hrs:   Resp   22 0135 16    Patient Vitals for the past 8 hrs:   BP   22 0135 (!) 144/76          Intake/Output Summary (Last 24 hours) at 2022 0807  Last data filed at 2022 0459  Gross per 24 hour   Intake 840 ml   Output 1750 ml   Net -910 ml       General: resting comfortably in no distress, elderly  HEENT: sclera anicteric, moist mucous membranes  Neck: supple, no JVD or HJR  CV: irregular rate and rhythm, distant heart sounds  Lungs: no respiratory distress, diminished BS bilateral bases  Abdomen: soft, non distended, non tender  MSK: dressing over right knee, right knee swelling., no left lower extremity edema  Skin: warm to touch  Neuro: alert and oriented, answered questions appropriately  Psych: normal mood and affect     Data Review:  Lab results reviewed as noted below. Current medications reviewed as noted below. Telemetry independently reviewed : []  sinus    []  chronic afib     [x]  par afib  rates   []  NSVT    ECG independently reviewed: []  NSR    []  no significant changes   [x]  no new ECG provided for review    No results for input(s): PH, PCO2, PO2 in the last 72 hours. Recent Labs     09/23/22  1611   CPK 72     Recent Labs     09/24/22  0410      K 4.2   *   CO2 23   BUN 18   CREA 1.12   *   CA 8.2*   ALB 2.0*   WBC 7.3   HGB 8.9*   HCT 28.1*        Recent Labs     09/24/22  0410   AP 79   TBILI 0.4   TP 5.7*   ALB 2.0*   GLOB 3.7     Recent Labs     09/25/22  0139 09/24/22  0132 09/23/22  0105   INR 1.3* 1.3* 1.3*   PTP 13.1* 13.5* 13.5*      No results for input(s): FE, TIBC, PSAT, FERR in the last 72 hours.    Lab Results   Component Value Date/Time    Glucose (POC) 80 04/20/2021 10:56 AM       Current Facility-Administered Medications   Medication Dose Route Frequency    metoprolol tartrate (LOPRESSOR) tablet 25 mg  25 mg Oral BID    tamsulosin (FLOMAX) capsule 0.4 mg  0.4 mg Oral DAILY    ketorolac (TORADOL) injection 15 mg  15 mg IntraVENous Q8H PRN    traMADoL (ULTRAM) tablet 50 mg  50 mg Oral Q6H PRN    furosemide (LASIX) tablet 10 mg  10 mg Oral QHS    furosemide (LASIX) tablet 20 mg  20 mg Oral DAILY    DAPTOmycin (CUBICIN) 500 mg in 0.9% sodium chloride 50 mL IVPB RF formulation  500 mg IntraVENous QPM    [Held by provider] atorvastatin (LIPITOR) tablet 20 mg  20 mg Oral QHS    sodium chloride (NS) flush 5-40 mL  5-40 mL IntraVENous Q8H    sodium chloride (NS) flush 5-40 mL  5-40 mL IntraVENous PRN    acetaminophen (TYLENOL) tablet 500 mg  500 mg Oral Q4HWA    oxyCODONE IR (ROXICODONE) tablet 2.5 mg  2.5 mg Oral Q3H PRN    oxyCODONE IR (ROXICODONE) tablet 5 mg  5 mg Oral Q3H PRN    naloxone (NARCAN) injection 0.4 mg  0.4 mg IntraVENous PRN    hydrOXYzine HCL (ATARAX) tablet 10 mg  10 mg Oral Q8H PRN    senna-docusate (PERICOLACE) 8.6-50 mg per tablet 1 Tablet  1 Tablet Oral BID    polyethylene glycol (MIRALAX) packet 17 g  17 g Oral DAILY    bisacodyL (DULCOLAX) suppository 10 mg  10 mg Rectal DAILY PRN    cefTRIAXone (ROCEPHIN) 2 g in 0.9% sodium chloride 20 mL IV syringe  2 g IntraVENous Q24H    Warfarin- pharmacy to dose   Other Rx Dosing/Monitoring         Elbert Wright, [address]  09/25/22

## 2022-09-25 NOTE — PROGRESS NOTES
Transition of Care    RUR: 14%    Finn Herrera was called with [facility]. They may have a bed for Mr. Pricila iMshra. The PA was messaged. Will continue to follow for discharge planning.   Signed By: Yisel Summers LCSW     September 25, 2022

## 2022-09-25 NOTE — PROGRESS NOTES
IV Antibiotic Orders    1. 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 Routine PICC care  3. Antibiotic:  Ceftriaxone 2 grams IV Q 24 hours  4. Lab each Monday:   CBC/diff/platelets   BMP   CRP  5. Lab each Thursday:   CBC/diff/platelets   BMP  6. Fax lab to Dr. Jessica Alvarado @ [phone].  7.  Call Dr. Jessica Alvarado @ [phone] for WBC under 4, CPK over 300 or creatinine over 2  8. Duration of therapy: 40 days from 9/23   Please call Dr. Jessica Alvarado @ [phone] before stopping therapy. 9.  Allergies: none to current regimen   10.  Call [phone] with name of [address] [place] [address] and to arrange follow up appointment in 14-21 days    Isabel Moore MD

## 2022-09-25 NOTE — PROGRESS NOTES
Pharmacist Note - Warfarin Dosing  Consult provided for this 89 y.o.male to manage warfarin for h/o DVT/afib, now s/p R knee I&D w/poly exchange    INR Goal: 2 - 3  Risk factors: Age > 65  Daily INR ordered: YES    Recent Labs     09/25/22  0139 09/24/22  0410 09/24/22  0132 09/23/22  0105   HGB  --  8.9*  --   --    INR 1.3*  --  1.3* 1.3*     Date               INR                  Dose  9/21   1.8, 1.2 5 mg  9/22   1.3        5 mg  9/23   1.3        5 mg  9/24                1.3                   7 mg   9/25                1.3                   8 mg                                                                                        Assessment/ Plan: Will order warfarin 8 mg PO x 1 dose. Pharmacy will continue to monitor daily and adjust therapy as indicated. Please contact the pharmacist at  for outpatient recommendations if needed.

## 2022-09-25 NOTE — PROGRESS NOTES
[facility]  Hospitalist Group                                                                                          Hospitalist Progress Note  Johnson Howard PA-C  Answering service: [phone] from in house phone        Date of Service:  2022  NAME:  Tiffany Gonzalez  :  1928  MRN:  [id number]      Admission Summary:   Tiffany Gonzalez is a 80 y.o. male with PMHx of remote recurrent DVT's on Coumadin, Atrial Fibrillation with RVR (s/p DCV in , discharged on Amiodarone/Metoprolol and off all medications on current admission), HFrEF (LVEF in  with moderate-to-severe systolic dysfunction, LVEF of 30-35%, but recovered to 60-65%), prostate cancer, who is currently admitted for Irrigation and Debridement of R knee s/p total knee replacement on 22 Hospitalist team consulted for post-operative A-Fib with RVR. Interval history / Subjective:   Reports feeling well this morning and offers no acute complaints. He feels the heart situation has [place] figured out. \" Answered all questions and concerns/      Assessment & Plan:     Tachycardia  A-Fib with RVR post-operatively  -- Beta Blocker: Metoprolol 12.5mg TID per cardiology  -- Goal K > 4.0, Mg > 2.0  -- On anticoagulation with Warfarin  -- TSH 1.31  -- Appreciate Cardiology consult. -- Continue Lasix 20/10mg. Does not appear hyper-or-hypovolemic driving this current A-Fib with RVR     #R knee arthroplasty infection  -s/p I&D  -abx dapto and ceftriaxone per primary team and ID     #Urinary Retention  -PVR with 600cc, then 563cc, confirmed by straight cath  -start flomax,place ag. Void trial can be pursued at discretion of primary team     Code status:  To be confirmed by primary team  Prophylaxis: Per primary team  Care Plan discussed with: Pt, RN, daughter  Anticipated Disposition: Per primary team     Hospital Problems  Date Reviewed: 2022            Codes Class Noted POA    Infection of total knee replacement, initial encounter [facility]) ICD-10-CM: T84.59XA, Z96.659  ICD-9-CM: 996.66, V43.65  9/21/2022 Yes             Review of Systems:   A comprehensive review of systems was negative except for that written in the HPI. Vital Signs:    Last 24hrs VS reviewed since prior progress note. Most recent are:  Visit Vitals  BP (!) 149/94 (BP 1 Location: Left upper arm, BP Patient Position: Lying)   Pulse (!) 107   Temp 97.7 °F (36.5 °C)   Resp 20   Ht 5' 8\" (1.727 m)   Wt 85.3 kg (188 lb)   SpO2 95%   BMI 28.59 kg/m²         Intake/Output Summary (Last 24 hours) at 9/25/2022 1030  Last data filed at 9/25/2022 0854  Gross per 24 hour   Intake 840 ml   Output 1925 ml   Net -1085 ml        Physical Examination:     I had a face to face encounter with this patient and independently examined them on 9/25/2022 as outlined below:          Constitutional:  No acute distress, cooperative, pleasant    ENT:  Oral mucosa moist, oropharynx benign. Resp:  CTA bilaterally. No wheezing/rhonchi/rales. No accessory muscle use. CV:  Irregularly irregular. No murmur appreciated    GI:  Soft, non distended, non tender. n    Musculoskeletal:  No edema, warm    Neurologic:  Moves all extremities. AAOx3            Data Review:    Review and/or order of clinical lab test  Review and/or order of tests in the radiology section of CPT  Review and/or order of tests in the medicine section of CPT      Labs:     Recent Labs     09/24/22  0410   WBC 7.3   HGB 8.9*   HCT 28.1*        Recent Labs     09/24/22  0410      K 4.2   *   CO2 23   BUN 18   CREA 1.12   *   CA 8.2*   MG 2.0     Recent Labs     09/24/22  0410   ALT 10*   AP 79   TBILI 0.4   TP 5.7*   ALB 2.0*   GLOB 3.7     Recent Labs     09/25/22  0139 09/24/22  0132 09/23/22  0105   INR 1.3* 1.3* 1.3*   PTP 13.1* 13.5* 13.5*      No results for input(s): FE, TIBC, PSAT, FERR in the last 72 hours.    No results found for: FOL, RBCF   No results for input(s): PH, PCO2, PO2 in the last 72 hours.   Recent Labs     09/23/22  1611   CPK 72     No results found for: CHOL, CHOLX, CHLST, CHOLV, HDL, HDLP, LDL, LDLC, DLDLP, TGLX, TRIGL, TRIGP, CHHD, CHHDX  Lab Results   Component Value Date/Time    Glucose (POC) 80 04/20/2021 10:56 AM     Lab Results   Component Value Date/Time    Color YELLOW/STRAW 09/16/2022 02:50 PM    Appearance CLOUDY (A) 09/16/2022 02:50 PM    Specific gravity 1.020 09/16/2022 02:50 PM    pH (UA) 7.0 09/16/2022 02:50 PM    Protein Negative 09/16/2022 02:50 PM    Glucose Negative 09/16/2022 02:50 PM    Ketone Negative 09/16/2022 02:50 PM    Bilirubin Negative 09/16/2022 02:50 PM    Urobilinogen 1.0 09/16/2022 02:50 PM    Nitrites Positive (A) 09/16/2022 02:50 PM    Leukocyte Esterase MODERATE (A) 09/16/2022 02:50 PM    Epithelial cells FEW 09/16/2022 02:50 PM    Bacteria 4+ (A) 09/16/2022 02:50 PM    WBC 10-20 09/16/2022 02:50 PM    RBC 0-5 09/16/2022 02:50 PM         Medications Reviewed:     Current Facility-Administered Medications   Medication Dose Route Frequency    metoprolol tartrate (LOPRESSOR) tablet 25 mg  25 mg Oral BID    warfarin (COUMADIN) tablet 8 mg  8 mg Oral ONCE    tamsulosin (FLOMAX) capsule 0.4 mg  0.4 mg Oral DAILY    ketorolac (TORADOL) injection 15 mg  15 mg IntraVENous Q8H PRN    traMADoL (ULTRAM) tablet 50 mg  50 mg Oral Q6H PRN    furosemide (LASIX) tablet 10 mg  10 mg Oral QHS    furosemide (LASIX) tablet 20 mg  20 mg Oral DAILY    DAPTOmycin (CUBICIN) 500 mg in 0.9% sodium chloride 50 mL IVPB RF formulation  500 mg IntraVENous QPM    [Held by provider] atorvastatin (LIPITOR) tablet 20 mg  20 mg Oral QHS    sodium chloride (NS) flush 5-40 mL  5-40 mL IntraVENous Q8H    sodium chloride (NS) flush 5-40 mL  5-40 mL IntraVENous PRN    acetaminophen (TYLENOL) tablet 500 mg  500 mg Oral Q4HWA    oxyCODONE IR (ROXICODONE) tablet 2.5 mg  2.5 mg Oral Q3H PRN    oxyCODONE IR (ROXICODONE) tablet 5 mg  5 mg Oral Q3H PRN    naloxone [facility]) injection 0.4 mg  0.4 mg IntraVENous PRN    hydrOXYzine HCL (ATARAX) tablet 10 mg  10 mg Oral Q8H PRN    senna-docusate (PERICOLACE) 8.6-50 mg per tablet 1 Tablet  1 Tablet Oral BID    polyethylene glycol (MIRALAX) packet 17 g  17 g Oral DAILY    bisacodyL (DULCOLAX) suppository 10 mg  10 mg Rectal DAILY PRN    cefTRIAXone (ROCEPHIN) 2 g in 0.9% sodium chloride 20 mL IV syringe  2 g IntraVENous Q24H    Warfarin- pharmacy to dose   Other Rx Dosing/Monitoring     ______________________________________________________________________  EXPECTED LENGTH OF STAY: 2d 14h  ACTUAL LENGTH OF STAY:          4                 Johnson Howard PA-C

## 2022-09-25 NOTE — PROGRESS NOTES
ID Progress Note  2022    Subjective:     Not feeling all that good right now. Having some knee pain    Objective:     Antibiotics:  Ceftriaxone       Vitals: Visit Vitals  BP (!) 149/94 (BP 1 Location: Left upper arm, BP Patient Position: Lying)   Pulse (!) 101   Temp 97.7 °F (36.5 °C)   Resp 20   Ht 5' 8\" (1.727 m)   Wt 85.3 kg (188 lb)   SpO2 95%   BMI 28.59 kg/m²        Tmax:  Temp (24hrs), Av.1 °F (36.7 °C), Min:97.7 °F (36.5 °C), Max:98.7 °F (37.1 °C)      Exam:  Lungs:  clear to auscultation bilaterally  Heart:  regular rate and rhythm  Abdomen:  soft, non-tender. Bowel sounds normal. No masses,  no organomegaly  Wound dressed and dry    Labs:      Recent Labs     22  0410   WBC 7.3   HGB 8.9*      BUN 18   CREA 1.12   AP 79   TBILI 0.4       Cultures:     Lab Results   Component Value Date/Time    Specimen Description: CLEAN [facility] 2011 04:10 PM    Specimen Description: ADAM 2011 03:52 PM     Lab Results   Component Value Date/Time    Culture result: No growth thus far, holding 14 days. 2022 02:08 PM    Culture result: NO GROWTH ON SOLID MEDIA THUS FAR 2022 02:08 PM    Culture result: (A) 2022 02:08 PM     STAPHYLOCOCCUS SPECIES, COAGULASE NEGATIVE ISOLATED FROM THIO BROTH ONLY    Culture result:  2022 02:08 PM     (NOTE) [facility] CALLED TO Jt Ga RN ON 22 AT 1431 BY DG    Culture result: No growth thus far, holding 14 days.  2022 02:08 PM       Radiology:     Line/Insert Date:           Assessment:     Chronic right TKR infection--cultures negative, but grew strep in the outpatient setting--GPCcl from operative culture--coag negative staph  Debility   S/P OR    Objective:     Continue current therapy--add daptomycin   Follow up cultures and studies  Plan for long-term antibiotic therapy    Jeanette Mckeon MD

## 2022-09-25 NOTE — PROGRESS NOTES
Nurse Alia Ravi gave bedside report to oncoming nurse [facility] RN by ANAID and Ge

## 2022-09-25 NOTE — PROGRESS NOTES
Orders received. Pt currently on a 5 day a week care plan and was seen Thursday and Friday. Will follow up on Monday.

## 2022-09-26 ENCOUNTER — APPOINTMENT (OUTPATIENT)
Dept: CT IMAGING | Age: 87
DRG: 467 | End: 2022-09-26
Attending: NURSE PRACTITIONER
Payer: MEDICARE

## 2022-09-26 LAB
INR PPP: 1.6 (ref 0.9–1.1)
PROTHROMBIN TIME: 16.1 SEC (ref 9–11.1)

## 2022-09-26 PROCEDURE — P9047 ALBUMIN (HUMAN), 25%, 50ML: HCPCS | Performed by: HOSPITALIST

## 2022-09-26 PROCEDURE — 74011250637 HC RX REV CODE- 250/637: Performed by: STUDENT IN AN ORGANIZED HEALTH CARE EDUCATION/TRAINING PROGRAM

## 2022-09-26 PROCEDURE — 74011250636 HC RX REV CODE- 250/636: Performed by: HOSPITALIST

## 2022-09-26 PROCEDURE — 74011250636 HC RX REV CODE- 250/636: Performed by: INTERNAL MEDICINE

## 2022-09-26 PROCEDURE — 70450 CT HEAD/BRAIN W/O DYE: CPT

## 2022-09-26 PROCEDURE — 74011000250 HC RX REV CODE- 250: Performed by: HOSPITALIST

## 2022-09-26 PROCEDURE — 74011250637 HC RX REV CODE- 250/637

## 2022-09-26 PROCEDURE — 85610 PROTHROMBIN TIME: CPT

## 2022-09-26 PROCEDURE — 74011250637 HC RX REV CODE- 250/637: Performed by: FAMILY MEDICINE

## 2022-09-26 PROCEDURE — 87086 URINE CULTURE/COLONY COUNT: CPT

## 2022-09-26 PROCEDURE — 65270000046 HC RM TELEMETRY

## 2022-09-26 PROCEDURE — 97530 THERAPEUTIC ACTIVITIES: CPT

## 2022-09-26 PROCEDURE — 74011000258 HC RX REV CODE- 258: Performed by: INTERNAL MEDICINE

## 2022-09-26 PROCEDURE — 36415 COLL VENOUS BLD VENIPUNCTURE: CPT

## 2022-09-26 PROCEDURE — 74011000250 HC RX REV CODE- 250: Performed by: PHYSICIAN ASSISTANT

## 2022-09-26 PROCEDURE — 94760 N-INVAS EAR/PLS OXIMETRY 1: CPT

## 2022-09-26 PROCEDURE — 74011250637 HC RX REV CODE- 250/637: Performed by: PHYSICIAN ASSISTANT

## 2022-09-26 RX ORDER — ALBUMIN HUMAN 250 G/1000ML
12.5 SOLUTION INTRAVENOUS ONCE
Status: COMPLETED | OUTPATIENT
Start: 2022-09-26 | End: 2022-09-26

## 2022-09-26 RX ORDER — DILTIAZEM HYDROCHLORIDE 5 MG/ML
10 INJECTION INTRAVENOUS ONCE
Status: DISCONTINUED | OUTPATIENT
Start: 2022-09-26 | End: 2022-09-26

## 2022-09-26 RX ORDER — METOPROLOL TARTRATE 5 MG/5ML
5 INJECTION INTRAVENOUS ONCE
Status: COMPLETED | OUTPATIENT
Start: 2022-09-26 | End: 2022-09-26

## 2022-09-26 RX ORDER — METOPROLOL TARTRATE 5 MG/5ML
2.5 INJECTION INTRAVENOUS ONCE
Status: COMPLETED | OUTPATIENT
Start: 2022-09-26 | End: 2022-09-26

## 2022-09-26 RX ORDER — METOPROLOL TARTRATE 50 MG/1
50 TABLET ORAL 2 TIMES DAILY
Status: DISCONTINUED | OUTPATIENT
Start: 2022-09-26 | End: 2022-09-30 | Stop reason: HOSPADM

## 2022-09-26 RX ADMIN — METOPROLOL TARTRATE 5 MG: 5 INJECTION, SOLUTION INTRAVENOUS at 00:49

## 2022-09-26 RX ADMIN — ALBUMIN (HUMAN) 12.5 G: 0.25 INJECTION, SOLUTION INTRAVENOUS at 01:19

## 2022-09-26 RX ADMIN — METOPROLOL TARTRATE 50 MG: 50 TABLET ORAL at 08:34

## 2022-09-26 RX ADMIN — ACETAMINOPHEN 500 MG: 325 TABLET, FILM COATED ORAL at 11:58

## 2022-09-26 RX ADMIN — ACETAMINOPHEN 500 MG: 325 TABLET, FILM COATED ORAL at 17:37

## 2022-09-26 RX ADMIN — DAPTOMYCIN 500 MG: 500 INJECTION, POWDER, LYOPHILIZED, FOR SOLUTION INTRAVENOUS at 21:24

## 2022-09-26 RX ADMIN — TAMSULOSIN HYDROCHLORIDE 0.4 MG: 0.4 CAPSULE ORAL at 08:34

## 2022-09-26 RX ADMIN — FUROSEMIDE 20 MG: 20 TABLET ORAL at 11:58

## 2022-09-26 RX ADMIN — SODIUM CHLORIDE, PRESERVATIVE FREE 10 ML: 5 INJECTION INTRAVENOUS at 06:40

## 2022-09-26 RX ADMIN — SENNOSIDES AND DOCUSATE SODIUM 1 TABLET: 50; 8.6 TABLET ORAL at 17:36

## 2022-09-26 RX ADMIN — METOPROLOL TARTRATE 50 MG: 50 TABLET ORAL at 17:36

## 2022-09-26 RX ADMIN — WARFARIN SODIUM 6 MG: 5 TABLET ORAL at 11:57

## 2022-09-26 RX ADMIN — ACETAMINOPHEN 500 MG: 325 TABLET, FILM COATED ORAL at 06:40

## 2022-09-26 RX ADMIN — SENNOSIDES AND DOCUSATE SODIUM 1 TABLET: 50; 8.6 TABLET ORAL at 08:34

## 2022-09-26 RX ADMIN — METOPROLOL TARTRATE 2.5 MG: 5 INJECTION, SOLUTION INTRAVENOUS at 03:09

## 2022-09-26 NOTE — PROGRESS NOTES
Verbal shift change report given to Rianna Francisco RN (oncoming nurse) by Jayden Ramsey RN (offgoing nurse). Report included the following information SBAR.

## 2022-09-26 NOTE — PROGRESS NOTES
Bedside shift change report given to [facility] (oncoming nurse) by Nir Oreilly  (offgoing nurse). Report included the following information SBAR, Kardex, Intake/Output, and MAR.

## 2022-09-26 NOTE — PROGRESS NOTES
[facility] Adult  Hospitalist Group                                                                                          Hospitalist Progress Note  Berkley Crenshaw NP  Answering service: [phone] OR [phone] from in house phone        Date of Service:  2022  NAME:  Mehdi Brannon  :  1928  MRN:  [id number]      Admission Summary:   Mehdi Brannon is a 80 y.o. 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 Hospitalist team consulted for post-operative A-Fib with RVR. Interval history / Subjective: Follow-up for issues listed below.   -Patient seen and examined, no c/o's. Family at bedside. Patient with noted more frequency in disorientation, easily redirected.  Advised nurse to also discuss with ID and primary team.     Assessment & Plan:     AFIB with RVR: onset post-op  -metoprolol 12.5mg BID  -goal K > 4.0, Mg > 2.0  -Warfarin continues  -TSH 1.31  -Cardiology consult  -lasix 20/10mg BID    Confusion:   -CT head w/o :  no acute intracranial abnormalities  -urine culture  -continue supportive care    Right knee arthroplasty infection:  -s/p I&D  -abx Daptomycin and Ceftriaxone per primary team and ID     Urinary Retention:  -PVR with 600cc, then 563cc, confirmed by straight cath  -flomax, ag: Void trial can be pursued at discretion of primary team          DVTppx: SCDs  GIppx: Pepcid  Code Status: Full Code  Diet: Cardiac  Activity: OOB to chair TID and PRN  Discharge: TBD  Ambulates: independently     Hospital Problems  Date Reviewed: 2022            Codes Class Noted POA    Infection of total knee replacement, initial encounter [facility]) ICD-10-CM: T84.59XA, Z96.659  ICD-9-CM: 996.66, V43.65  9/21/2022 Yes             Review of Systems:   A comprehensive review of systems was negative except for that written in the HPI. Vital Signs:    Last 24hrs VS reviewed since prior progress note. Most recent are:  Visit Vitals  BP (P) 130/75 (BP 1 Location: Left upper arm)   Pulse (P) 91   Temp (P) 98.1 °F (36.7 °C)   Resp (P) 18   Ht 5' 8\" (1.727 m)   Wt 85.3 kg (188 lb)   SpO2 (P) 96%   BMI 28.59 kg/m²         Intake/Output Summary (Last 24 hours) at 9/26/2022 1529  Last data filed at 9/26/2022 1152  Gross per 24 hour   Intake 300 ml   Output 950 ml   Net -650 ml        Physical Examination:     I had a face to face encounter with this patient and independently examined them on 9/26/2022 as outlined below:      Constitutional:  No acute distress, cooperative, pleasant mood. ENT:  Oral mucosa moist.    Resp:  CTA bilaterally. No wheezing/rhonchi/rales. No accessory muscle use. CV:  Regular rhythm, normal rate, S1,S2.    GI/:  Soft, non distended, non tender, no guarding, BS present. Webb patent. Musculoskeletal:  No edema, warm. Neurologic:  Moves all extremities. AAOx 3, intermittent confusion, easily redirected. Skin:  w/d. Psych:  some disorientation, Not anxious nor agitated. Data Review:    Review and/or order of clinical lab test      Labs:     Recent Labs     09/24/22  0410   WBC 7.3   HGB 8.9*   HCT 28.1*        Recent Labs     09/24/22  0410      K 4.2   *   CO2 23   BUN 18   CREA 1.12   *   CA 8.2*   MG 2.0     Recent Labs     09/24/22 0410   ALT 10*   AP 79   TBILI 0.4   TP 5.7*   ALB 2.0*   GLOB 3.7     Recent Labs     09/26/22  0320 09/25/22  0139 09/24/22  0132   INR 1.6* 1.3* 1.3*   PTP 16.1* 13.1* 13.5*      No results for input(s): FE, TIBC, PSAT, FERR in the last 72 hours.    No results found for: FOL, RBCF   No results for input(s): PH, PCO2, PO2 in the last 72 hours.   Recent Labs     09/23/22  1611   CPK 72     No results found for: CHOL, CHOLX, CHLST, CHOLV, HDL, HDLP, LDL, LDLC, DLDLP, TGLX, TRIGL, TRIGP, CHHD, CHHDX  Lab Results   Component Value Date/Time    Glucose (POC) 80 04/20/2021 10:56 AM     Lab Results   Component Value Date/Time    Color YELLOW/STRAW 09/16/2022 02:50 PM    Appearance CLOUDY (A) 09/16/2022 02:50 PM    Specific gravity 1.020 09/16/2022 02:50 PM    pH (UA) 7.0 09/16/2022 02:50 PM    Protein Negative 09/16/2022 02:50 PM    Glucose Negative 09/16/2022 02:50 PM    Ketone Negative 09/16/2022 02:50 PM    Bilirubin Negative 09/16/2022 02:50 PM    Urobilinogen 1.0 09/16/2022 02:50 PM    Nitrites Positive (A) 09/16/2022 02:50 PM    Leukocyte Esterase MODERATE (A) 09/16/2022 02:50 PM    Epithelial cells FEW 09/16/2022 02:50 PM    Bacteria 4+ (A) 09/16/2022 02:50 PM    WBC 10-20 09/16/2022 02:50 PM    RBC 0-5 09/16/2022 02:50 PM         Medications Reviewed:     Current Facility-Administered Medications   Medication Dose Route Frequency    metoprolol tartrate (LOPRESSOR) tablet 50 mg  50 mg Oral BID    DAPTOmycin (CUBICIN) 500 mg in 0.9% sodium chloride 50 mL IVPB  500 mg IntraVENous Q24H    tamsulosin (FLOMAX) capsule 0.4 mg  0.4 mg Oral DAILY    ketorolac (TORADOL) injection 15 mg  15 mg IntraVENous Q8H PRN    traMADoL (ULTRAM) tablet 50 mg  50 mg Oral Q6H PRN    furosemide (LASIX) tablet 10 mg  10 mg Oral QHS    furosemide (LASIX) tablet 20 mg  20 mg Oral DAILY    [Held by provider] atorvastatin (LIPITOR) tablet 20 mg  20 mg Oral QHS    sodium chloride (NS) flush 5-40 mL  5-40 mL IntraVENous Q8H    sodium chloride (NS) flush 5-40 mL  5-40 mL IntraVENous PRN    acetaminophen (TYLENOL) tablet 500 mg  500 mg Oral Q4HWA    oxyCODONE IR (ROXICODONE) tablet 2.5 mg  2.5 mg Oral Q3H PRN    oxyCODONE IR (ROXICODONE) tablet 5 mg  5 mg Oral Q3H PRN    naloxone (NARCAN) injection 0.4 mg  0.4 mg IntraVENous PRN    hydrOXYzine HCL (ATARAX) tablet 10 mg  10 mg Oral Q8H PRN    senna-docusate (PERICOLACE) 8.6-50 mg per tablet 1 Tablet  1 Tablet Oral BID    polyethylene glycol (MIRALAX) packet 17 g  17 g Oral DAILY    bisacodyL (DULCOLAX) suppository 10 mg  10 mg Rectal DAILY PRN    cefTRIAXone (ROCEPHIN) 2 g in 0.9% sodium chloride 20 mL IV syringe  2 g IntraVENous Q24H    Warfarin- pharmacy to dose   Other Rx Dosing/Monitoring     ______________________________________________________________________  EXPECTED LENGTH OF STAY: 2d 14h  ACTUAL LENGTH OF STAY:          Yue 67, NP

## 2022-09-26 NOTE — PROGRESS NOTES
Problem: Self Care Deficits Care Plan (Adult)  Goal: *Acute Goals and Plan of Care (Insert Text)  Description: FUNCTIONAL STATUS PRIOR TO ADMISSION: Patient was independent and active without use of DME.    HOME SUPPORT: The patient lived alone with family in area to provide assistance. Occupational Therapy Goals  Initiated 9/23/2022  1. Patient will perform lower body ADLs with contact guard assistance within 7 day(s). 2.  Patient will perform upper body ADLs standing 5 mins without fatigue or LOB with contact guard assistance within 7 day(s). 3.  Patient will perform all aspects of toileting with contact guard assistance within 7 day(s). 4.  Patient will participate in upper extremity therapeutic exercise/activities with contact guard assistance for 10 minutes within 7 day(s). 5.  Patient will utilize energy conservation techniques during functional activities without cues within 7 day(s). Outcome: Progressing Towards Goal   OCCUPATIONAL THERAPY TREATMENT  Patient: Aaron Shea (89 y.o. male)  Date: 9/26/2022  Diagnosis: In[facility] [T84.59XA, Z96.659] <principal problem not specified>  Procedure(s) (LRB):  RIGHT KNEE INCISION AND DRAINAGE WITH POLYETHYLENE EXCHANGE (SPINAL/IV SED) (Right) 5 Days Post-Op  Precautions: Fall, WBAT  Chart, occupational therapy assessment, plan of care, and goals were reviewed. ASSESSMENT  Patient continues with skilled OT services and is slowly progressing towards goals. Nursing cleared for light activity as patient is in Afib. Received patient in chair attempting to get up to use bathroom. Oriented to self and reports he was in the doctor's office. Patient with catheter with poor carry over continuing to asking to go to the bathroom to urinate. Facilitated transfer to chair with RW with frequent cues for proper use and min A for sit > stand. Poor safety awareness with RW use.   HR 120s with transfer, limited ability to progress. Sit > supine with CGA. Current Level of Function Impacting Discharge (ADLs): self care transfer min A for sit > stand    Other factors to consider for discharge: He is POD 5 from R knee I & D. Patient lives at home alone. Presenting with confusion and would not be safe to dc home alone today. Recommend rehab, if unable to go rehab then he will need additional assistance at home. PLAN :  Patient continues to benefit from skilled intervention to address the above impairments. Continue treatment per established plan of care to address goals. Recommend with staff: min A for self care transfer with RW to chair for meals and toilet for toileting as medically stable. Recommend next OT session: per POC    Recommendation for discharge: (in order for the patient to meet his/her long term goals)  Therapy 3 hours per day 5-7 days per week    This discharge recommendation:  Has been made in collaboration with the attending provider and/or case management    IF patient discharges home will need the following DME: AE: long handled bathing, AE: long handled dressing, and walker: rolling       SUBJECTIVE:   Patient stated I told them I need to go to the bathroom.     OBJECTIVE DATA SUMMARY:   Cognitive/Behavioral Status:  Neurologic State: Alert  Orientation Level: Disoriented X4  Cognition: Impaired decision making             Functional Mobility and Transfers for ADLs:  Bed Mobility:  Sit to Supine: Contact guard assistance    Transfers:  Sit to Stand: Moderate assistance          Balance:  Sitting: Intact  Standing: Impaired  Standing - Static: Constant support;Good  Standing - Dynamic : Constant support; Fair    ADL Intervention:       Patient instructed and indicated understanding the benefits of maintaining activity tolerance, functional mobility, and independence with self care tasks during acute stay  to ensure safe return home and to baseline.  Encouraged patient to increase frequency and duration OOB, be out of bed for all meals, perform daily ADLs (as approved by RN/MD regarding bathing etc), and performing functional mobility to/from bathroom. .  Provided education through multi-modal cues for RW safety including proper positioning, hand placement,  and safety. Patient able to perform sit <> stand with min A assistance. Poor understanding of RW use and safety. Provided education with patient on fall prevention for hospital and at home. This includes not getting OOB/chair/toilet without staff assistance, good lighting, good footwear, and recommended AD use. Patient with fair understanding. Activated bed alarm. Pain:  Reports soreness in R knee, did not rate    Activity Tolerance:   Fair, Poor, and HR to 120s    After treatment patient left in no apparent distress:   Supine in bed, Call bell within reach, Bed / chair alarm activated, and Side rails x 3    COMMUNICATION/COLLABORATION:   The patients plan of care was discussed with: Physical therapy assistant and Registered nurse.      Abigail Fatima OT  Time Calculation: 12 mins

## 2022-09-26 NOTE — PROGRESS NOTES
Cardiology Progress Note  2022    Admit Date: 2022  Admit Diagnosis: In[facility] [T84.59XA, Z96.659]  CC:  AF    Assessment/Recommendations:  Paroxysmal AF with RVR - prior cardioversion in   - continue rate control strategy for now. Increase metoprolol 50 mg BID. Delirious overnight and this is likely contributing to poor rate control  - on warfarin  - continue telemetry monitoring     2. H/o cardiomyopathy - LVEF reduced to 30-35% previously but recovered to 60-65% in   - appears euvolemic currently  - continue home lasix     3. S/p right TKR I&D  - on antibiotics per ID     4. H/o recurrent DVT  - home warfarin has been restarted    5. Delirium  - monitor for signs of new infection    Thank you for this consult. We will continue to follow along. Please contact us for any further questions or concerns. Hospital problem list     Active Problems:    Infection of total knee replacement, initial encounter ([facility] (2022)                                                        Subjective:     Confused this morning. Not aware that he is at [facility]. HR increased overnight. Given IV metoprolol. Denies chest pain, or dyspnea. Knee pain controlled    ROS: All other systems reviewed and were negative other than mentioned above. No change in family and social history from H&P/Consult note.     Objective:    Physical Exam:  24 hr VS reviewed, overall VSSAF  Temp (24hrs), Av °F (36.7 °C), Min:97.6 °F (36.4 °C), Max:98.5 °F (36.9 °C)    Patient Vitals for the past 8 hrs:   Pulse   22 0613 (!) 126   22 0401 (!) 113   22 0315 (!) 112   22 0308 (!) 114   22 0226 (!) 118   22 0202 (!) 109   22 0109 (!) 111   22 0057 (!) 101   22 0048 (!) 119    Patient Vitals for the past 8 hrs:   Resp   22 0226 18    Patient Vitals for the past 8 hrs:   BP   09/26/22 0315 (!) 144/79   09/26/22 0308 (!) 156/108   09/26/22 0226 137/88   09/26/22 0109 132/77   09/26/22 0057 (!) 152/94   09/26/22 0048 (!) 150/86          Intake/Output Summary (Last 24 hours) at 9/26/2022 0743  Last data filed at 9/26/2022 7206  Gross per 24 hour   Intake --   Output 1025 ml   Net -1025 ml       General: resting comfortably in no distress, elderly  HEENT: sclera anicteric, moist mucous membranes  Neck: supple, no JVD or HJR  CV: irregular rate and rhythm, distant heart sounds  Lungs: no respiratory distress, diminished BS bilateral bases  Abdomen: soft, non distended, non tender  MSK: dressing over right knee, right knee swelling., no left lower extremity edema  Skin: warm to touch  Neuro: alert and oriented to self and year, not location  Psych: normal mood and affect     Data Review:  Lab results reviewed as noted below. Current medications reviewed as noted below. Telemetry independently reviewed : []  sinus    []  chronic afib     [x]  par afib  rates 100-120  []  NSVT    ECG independently reviewed: []  NSR    []  no significant changes   [x]  no new ECG provided for review    No results for input(s): PH, PCO2, PO2 in the last 72 hours. Recent Labs     09/23/22  1611   CPK 72     Recent Labs     09/24/22  0410      K 4.2   *   CO2 23   BUN 18   CREA 1.12   *   CA 8.2*   ALB 2.0*   WBC 7.3   HGB 8.9*   HCT 28.1*        Recent Labs     09/24/22  0410   AP 79   TBILI 0.4   TP 5.7*   ALB 2.0*   GLOB 3.7     Recent Labs     09/26/22  0320 09/25/22  0139 09/24/22  0132   INR 1.6* 1.3* 1.3*   PTP 16.1* 13.1* 13.5*      No results for input(s): FE, TIBC, PSAT, FERR in the last 72 hours.    Lab Results   Component Value Date/Time    Glucose (POC) 80 04/20/2021 10:56 AM       Current Facility-Administered Medications   Medication Dose Route Frequency    metoprolol tartrate (LOPRESSOR) tablet 50 mg  50 mg Oral BID    DAPTOmycin (CUBICIN) 500 mg in 0.9% sodium chloride 50 mL IVPB  500 mg IntraVENous Q24H    tamsulosin (FLOMAX) capsule 0.4 mg  0.4 mg Oral DAILY    ketorolac (TORADOL) injection 15 mg  15 mg IntraVENous Q8H PRN    traMADoL (ULTRAM) tablet 50 mg  50 mg Oral Q6H PRN    furosemide (LASIX) tablet 10 mg  10 mg Oral QHS    furosemide (LASIX) tablet 20 mg  20 mg Oral DAILY    [Held by provider] atorvastatin (LIPITOR) tablet 20 mg  20 mg Oral QHS    sodium chloride (NS) flush 5-40 mL  5-40 mL IntraVENous Q8H    sodium chloride (NS) flush 5-40 mL  5-40 mL IntraVENous PRN    acetaminophen (TYLENOL) tablet 500 mg  500 mg Oral Q4HWA    oxyCODONE IR (ROXICODONE) tablet 2.5 mg  2.5 mg Oral Q3H PRN    oxyCODONE IR (ROXICODONE) tablet 5 mg  5 mg Oral Q3H PRN    naloxone (NARCAN) injection 0.4 mg  0.4 mg IntraVENous PRN    hydrOXYzine HCL (ATARAX) tablet 10 mg  10 mg Oral Q8H PRN    senna-docusate (PERICOLACE) 8.6-50 mg per tablet 1 Tablet  1 Tablet Oral BID    polyethylene glycol (MIRALAX) packet 17 g  17 g Oral DAILY    bisacodyL (DULCOLAX) suppository 10 mg  10 mg Rectal DAILY PRN    cefTRIAXone (ROCEPHIN) 2 g in 0.9% sodium chloride 20 mL IV syringe  2 g IntraVENous Q24H    Warfarin- pharmacy to dose   Other Rx Dosing/Monitoring         Elbert Valerio, [facility]  09/26/22

## 2022-09-26 NOTE — PROGRESS NOTES
Transition of Care    RUR: 13%    [facility] called from [facility] to see if Mr. Pricila Mishra was ready for discharge. She was informed that he was confused and in Atrial fib today. He is being evaluated. She requested a call back regarding an update on Mr. Emy Lara condition. Will continue to follow for discharge planning.   Signed By: Yisel Summers LCSW     September 26, 2022

## 2022-09-26 NOTE — PROGRESS NOTES
Ortho Daily Progress Note    Date of Surgery:  2022      Patient: Sharon Ferraro   YOB: 1928  Age: 80 y.o. SUBJECTIVE:   5 Days Post-Op following RIGHT KNEE INCISION AND DRAINAGE WITH POLYETHYLENE EXCHANGE (SPINAL/IV SED)    Some confusion this morning. Reports knee pain overall controlled. The patient denies CP, SOB, N/V.     OBJECTIVE:     Vital Signs:    Temp (24hrs), Av °F (36.7 °C), Min:97.4 °F (36.3 °C), Max:98.5 °F (36.9 °C)    Patient Vitals for the past 24 hrs:   BP Temp Pulse Resp SpO2   22 0826 123/85 97.4 °F (36.3 °C) (!) 116 20 96 %   22 0613 -- -- (!) 126 -- --   22 0401 -- -- (!) 113 -- --   22 0315 (!) 144/79 -- (!) 112 -- --   22 0308 (!) 156/108 -- (!) 114 -- --   22 0226 137/88 98 °F (36.7 °C) (!) 118 18 96 %   22 0202 -- -- (!) 109 -- --   22 0109 132/77 -- (!) 111 -- --   22 0057 (!) 152/94 -- (!) 101 -- --   22 0048 (!) 150/86 -- (!) 119 -- --   22 2341 (!) 160/98 98.5 °F (36.9 °C) (!) 109 18 97 %   22 -- -- 91 -- --   22 2102 132/78 98.3 °F (36.8 °C) 100 18 95 %   22 -- -- (!) 123 -- --   22 1800 -- -- 94 -- --   22 1541 136/80 97.6 °F (36.4 °C) (!) 102 18 96 %   22 1400 -- -- 87 -- --   22 1200 -- -- (!) 115 -- --   22 1000 -- -- (!) 101 -- --             Physical Exam:  General: A&Ox3, NAD. Respiratory: Respirations are unlabored. Abdomen: S/NT  Surgical site: C,D and I.  Musculoskeletal: Moderate operative knee effusion. Calves are S/NT, Mehran dorsi/plantar flexion/EHL intact, distal pulses intact  Neurological:  Mehran LE's NVI    Laboratory Values:             Recent Labs     22  0320 22  0139 22  0410   HGB  --   --  8.9*   PLT  --   --  150   INR 1.6*   < >  --    CREA  --   --  1.12   GLU  --   --  101*    < > = values in this interval not displayed.        Lab Results   Component Value Date/Time    Sodium 138 09/24/2022 04:10 AM    Potassium 4.2 09/24/2022 04:10 AM    Chloride 111 (H) 09/24/2022 04:10 AM    CO2 23 09/24/2022 04:10 AM    Glucose 101 (H) 09/24/2022 04:10 AM    BUN 18 09/24/2022 04:10 AM    Creatinine 1.12 09/24/2022 04:10 AM    Calcium 8.2 (L) 09/24/2022 04:10 AM         PLAN:     S/P RIGHT KNEE INCISION AND DRAINAGE WITH POLYETHYLENE EXCHANGE (SPINAL/IV SED)    INFECTED TOTAL KNEE REPLACEMENT    POST OP A-FIB W/RVR WBAT. Mobilize with PT/nursing until discharged. Antibiotics per ID, PICC line in place. IV abx orders placed by ID on 9/25/22. Management per Hospitalist/Cardiology         Hemodynamics Stable. Wound Aquacell x 7 days unless saturated or loses integrity. Post Operative Pain Tramadol, Tylenol, toradol PRN. DVT Prophylaxis Coumadin pharmacy dosing, SCD's, encourage bed exercises, mobilize. Discharge Disposition Appreciate Hospitalist and Cardiology assistance. Case management for discharge planning. Plans for dispo to [facility] once bed available and medically stable per hospitalist/cardiology.         Signed By:     Barbara Michelle PA-C  Physician Assistant  [address]  Mobile [phone]  Office [phone]     September 26, 2022 9:21 AM

## 2022-09-26 NOTE — PROGRESS NOTES
Pharmacist Note - Warfarin Dosing  Consult provided for this 89 y.o.male to manage warfarin for h/o DVT/afib, now s/p R knee I&D w/poly exchange    INR Goal: 2 - 3  Risk factors: Age > 65  Daily INR ordered: YES    Recent Labs     09/26/22  0320 09/25/22  0139 09/24/22  0410 09/24/22  0132   HGB  --   --  8.9*  --    INR 1.6* 1.3*  --  1.3*     Date               INR                  Dose  9/21   1.8, 1.2 5 mg  9/22   1.3        5 mg  9/23   1.3        5 mg  9/24                1.3                   7 mg   9/25                1.3                   8 mg           9/26                1.6                   6 mg                                                                               Assessment/ Plan: Will order warfarin 6 mg PO x 1 dose considering small increase in INR, h/o DVTs, overall INR trend    Pharmacy will continue to monitor daily and adjust therapy as indicated. Please contact the pharmacist at  for outpatient recommendations if needed.

## 2022-09-26 NOTE — PROGRESS NOTES
ID Progress Note  2022    Subjective:     A fib, agitated    Objective:     Antibiotics:  Ceftriaxone       Vitals: Visit Vitals  BP (P) 130/75 (BP 1 Location: Left upper arm)   Pulse (P) 91   Temp (P) 98.1 °F (36.7 °C)   Resp (P) 18   Ht 5' 8\" (1.727 m)   Wt 85.3 kg (188 lb)   SpO2 (P) 96%   BMI 28.59 kg/m²        Tmax:  Temp (24hrs), Av °F (36.7 °C), Min:97.4 °F (36.3 °C), Max:98.5 °F (36.9 °C)      Exam:  Lungs:  clear to auscultation bilaterally  Heart:  regular rate and rhythm  Abdomen:  soft, non-tender. Bowel sounds normal. No masses,  no organomegaly  Wound dressed and dry    Labs:      Recent Labs     22  0410   WBC 7.3   HGB 8.9*      BUN 18   CREA 1.12   AP 79   TBILI 0.4       Cultures:     Lab Results   Component Value Date/Time    Specimen Description: [facility] 2011 04:10 PM    Specimen Description: ADAM 2011 03:52 PM     Lab Results   Component Value Date/Time    Culture result: No growth thus far, holding 14 days. 2022 02:08 PM    Culture result: NO GROWTH ON SOLID MEDIA THUS FAR 2022 02:08 PM    Culture result: (A) 2022 02:08 PM     STAPHYLOCOCCUS SPECIES, COAGULASE NEGATIVE ISOLATED FROM THIO BROTH ONLY    Culture result:  2022 02:08 PM     (NOTE) [facility] CALLED TO Yaneth Patel RN ON 22 AT 1431 BY DG    Culture result: No growth thus far, holding 14 days.  2022 02:08 PM       Radiology:     Line/Insert Date:           Assessment:     Chronic right TKR infection--cultures negative, but grew strep in the outpatient setting--GPCcl from operative culture--coag negative staph  Debility   S/P OR    Objective:     Continue current therapy  Follow up cultures and studies  Plan for long-term antibiotic therapy    Giselle He MD

## 2022-09-27 LAB
ALBUMIN SERPL-MCNC: 2.4 G/DL (ref 3.5–5)
ALBUMIN/GLOB SERPL: 0.6 {RATIO} (ref 1.1–2.2)
ALP SERPL-CCNC: 76 U/L (ref 45–117)
ALT SERPL-CCNC: 20 U/L (ref 12–78)
AMMONIA PLAS-SCNC: 23 UMOL/L
ANION GAP SERPL CALC-SCNC: 5 MMOL/L (ref 5–15)
AST SERPL-CCNC: 31 U/L (ref 15–37)
BACTERIA SPEC CULT: NORMAL
BILIRUB SERPL-MCNC: 0.4 MG/DL (ref 0.2–1)
BUN SERPL-MCNC: 18 MG/DL (ref 6–20)
BUN/CREAT SERPL: 16 (ref 12–20)
CALCIUM SERPL-MCNC: 8.7 MG/DL (ref 8.5–10.1)
CHLORIDE SERPL-SCNC: 109 MMOL/L (ref 97–108)
CO2 SERPL-SCNC: 25 MMOL/L (ref 21–32)
CREAT SERPL-MCNC: 1.14 MG/DL (ref 0.7–1.3)
ERYTHROCYTE [DISTWIDTH] IN BLOOD BY AUTOMATED COUNT: 17.2 % (ref 11.5–14.5)
FOLATE SERPL-MCNC: 16.4 NG/ML (ref 5–21)
GLOBULIN SER CALC-MCNC: 4 G/DL (ref 2–4)
GLUCOSE SERPL-MCNC: 122 MG/DL (ref 65–100)
HCT VFR BLD AUTO: 31 % (ref 36.6–50.3)
HGB BLD-MCNC: 9.7 G/DL (ref 12.1–17)
INR PPP: 2 (ref 0.9–1.1)
MCH RBC QN AUTO: 26.7 PG (ref 26–34)
MCHC RBC AUTO-ENTMCNC: 31.3 G/DL (ref 30–36.5)
MCV RBC AUTO: 85.4 FL (ref 80–99)
NRBC # BLD: 0 K/UL (ref 0–0.01)
NRBC BLD-RTO: 0 PER 100 WBC
PLATELET # BLD AUTO: 219 K/UL (ref 150–400)
PMV BLD AUTO: 10.5 FL (ref 8.9–12.9)
POTASSIUM SERPL-SCNC: 3.9 MMOL/L (ref 3.5–5.1)
PROT SERPL-MCNC: 6.4 G/DL (ref 6.4–8.2)
PROTHROMBIN TIME: 19.9 SEC (ref 9–11.1)
RBC # BLD AUTO: 3.63 M/UL (ref 4.1–5.7)
SERVICE CMNT-IMP: NORMAL
SODIUM SERPL-SCNC: 139 MMOL/L (ref 136–145)
VIT B12 SERPL-MCNC: 271 PG/ML (ref 193–986)
WBC # BLD AUTO: 7.3 K/UL (ref 4.1–11.1)

## 2022-09-27 PROCEDURE — 74011250637 HC RX REV CODE- 250/637: Performed by: FAMILY MEDICINE

## 2022-09-27 PROCEDURE — 36415 COLL VENOUS BLD VENIPUNCTURE: CPT

## 2022-09-27 PROCEDURE — 74011000250 HC RX REV CODE- 250: Performed by: PHYSICIAN ASSISTANT

## 2022-09-27 PROCEDURE — 85610 PROTHROMBIN TIME: CPT

## 2022-09-27 PROCEDURE — 82746 ASSAY OF FOLIC ACID SERUM: CPT

## 2022-09-27 PROCEDURE — 65270000046 HC RM TELEMETRY

## 2022-09-27 PROCEDURE — 74011250636 HC RX REV CODE- 250/636: Performed by: HOSPITALIST

## 2022-09-27 PROCEDURE — 82140 ASSAY OF AMMONIA: CPT

## 2022-09-27 PROCEDURE — 80053 COMPREHEN METABOLIC PANEL: CPT

## 2022-09-27 PROCEDURE — 82607 VITAMIN B-12: CPT

## 2022-09-27 PROCEDURE — 97116 GAIT TRAINING THERAPY: CPT

## 2022-09-27 PROCEDURE — 74011250637 HC RX REV CODE- 250/637

## 2022-09-27 PROCEDURE — 74011250636 HC RX REV CODE- 250/636: Performed by: INTERNAL MEDICINE

## 2022-09-27 PROCEDURE — 74011000258 HC RX REV CODE- 258: Performed by: INTERNAL MEDICINE

## 2022-09-27 PROCEDURE — 74011250637 HC RX REV CODE- 250/637: Performed by: STUDENT IN AN ORGANIZED HEALTH CARE EDUCATION/TRAINING PROGRAM

## 2022-09-27 PROCEDURE — 74011000250 HC RX REV CODE- 250: Performed by: HOSPITALIST

## 2022-09-27 PROCEDURE — 74011250636 HC RX REV CODE- 250/636: Performed by: PHYSICIAN ASSISTANT

## 2022-09-27 PROCEDURE — 74011250637 HC RX REV CODE- 250/637: Performed by: PHYSICIAN ASSISTANT

## 2022-09-27 PROCEDURE — 85027 COMPLETE CBC AUTOMATED: CPT

## 2022-09-27 RX ORDER — METOPROLOL TARTRATE 50 MG/1
50 TABLET ORAL 2 TIMES DAILY
Qty: 60 TABLET | Refills: 0 | Status: SHIPPED | OUTPATIENT
Start: 2022-09-27 | End: 2022-10-27

## 2022-09-27 RX ORDER — TAMSULOSIN HYDROCHLORIDE 0.4 MG/1
0.4 CAPSULE ORAL DAILY
Qty: 14 CAPSULE | Refills: 0 | Status: SHIPPED | OUTPATIENT
Start: 2022-09-28 | End: 2022-10-12

## 2022-09-27 RX ORDER — OXYCODONE HYDROCHLORIDE 5 MG/1
2.5-5 TABLET ORAL
Qty: 28 TABLET | Refills: 0 | Status: SHIPPED | OUTPATIENT
Start: 2022-09-27 | End: 2022-10-04

## 2022-09-27 RX ORDER — WARFARIN SODIUM 5 MG/1
5 TABLET ORAL ONCE
Status: COMPLETED | OUTPATIENT
Start: 2022-09-27 | End: 2022-09-27

## 2022-09-27 RX ADMIN — FUROSEMIDE 20 MG: 20 TABLET ORAL at 08:47

## 2022-09-27 RX ADMIN — SENNOSIDES AND DOCUSATE SODIUM 1 TABLET: 50; 8.6 TABLET ORAL at 18:39

## 2022-09-27 RX ADMIN — FUROSEMIDE 10 MG: 20 TABLET ORAL at 21:09

## 2022-09-27 RX ADMIN — ACETAMINOPHEN 500 MG: 325 TABLET, FILM COATED ORAL at 11:16

## 2022-09-27 RX ADMIN — METOPROLOL TARTRATE 50 MG: 50 TABLET ORAL at 18:39

## 2022-09-27 RX ADMIN — SENNOSIDES AND DOCUSATE SODIUM 1 TABLET: 50; 8.6 TABLET ORAL at 08:47

## 2022-09-27 RX ADMIN — METOPROLOL TARTRATE 50 MG: 50 TABLET ORAL at 08:47

## 2022-09-27 RX ADMIN — ACETAMINOPHEN 500 MG: 325 TABLET, FILM COATED ORAL at 18:39

## 2022-09-27 RX ADMIN — SODIUM CHLORIDE, PRESERVATIVE FREE 10 ML: 5 INJECTION INTRAVENOUS at 21:09

## 2022-09-27 RX ADMIN — ALTEPLASE 1 MG: 2.2 INJECTION, POWDER, LYOPHILIZED, FOR SOLUTION INTRAVENOUS at 21:09

## 2022-09-27 RX ADMIN — DAPTOMYCIN 500 MG: 500 INJECTION, POWDER, LYOPHILIZED, FOR SOLUTION INTRAVENOUS at 21:57

## 2022-09-27 RX ADMIN — ACETAMINOPHEN 500 MG: 325 TABLET, FILM COATED ORAL at 00:00

## 2022-09-27 RX ADMIN — CEFTRIAXONE SODIUM 2 G: 2 INJECTION, POWDER, FOR SOLUTION INTRAMUSCULAR; INTRAVENOUS at 00:00

## 2022-09-27 RX ADMIN — ACETAMINOPHEN 500 MG: 325 TABLET, FILM COATED ORAL at 15:09

## 2022-09-27 RX ADMIN — WARFARIN SODIUM 5 MG: 5 TABLET ORAL at 11:16

## 2022-09-27 RX ADMIN — ACETAMINOPHEN 500 MG: 325 TABLET, FILM COATED ORAL at 21:09

## 2022-09-27 RX ADMIN — FUROSEMIDE 10 MG: 20 TABLET ORAL at 00:00

## 2022-09-27 RX ADMIN — TAMSULOSIN HYDROCHLORIDE 0.4 MG: 0.4 CAPSULE ORAL at 08:47

## 2022-09-27 NOTE — PROGRESS NOTES
[facility] [facility] Adult  Hospitalist Group                                                                                          Hospitalist Progress Note  KYLE VENTURA  Answering service: [phone] OR [phone] from in house phone        Date of Service:  2022  NAME:  Paula Abreu  :  1928  MRN:  [id number]      Admission Summary:   Paula Abreu is a 80 y.o. 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 Hospitalist team consulted for post-operative A-Fib with RVR. Interval history / Subjective: Follow-up for issues listed below.   -Patient seen and examined, no c/o's. Sitting up to chair. Stable to discharge to Rehab from our standpoint.       Assessment & Plan:     AFIB with RVR: onset post-op  -metoprolol 12.5mg BID  -goal K > 4.0, Mg > 2.0  -Warfarin continues  -TSH 1.31  -Cardiology consult  -lasix 20/10mg BID     Confusion: resolved  -CT head w/o :  no acute intracranial abnormalities  -urine culture- no growth  -continue supportive care     Right knee arthroplasty infection:  -s/p I&D  -abx Daptomycin and Ceftriaxone per primary team and ID     Urinary Retention:  -PVR with 600cc, then 563cc, confirmed by straight cath  -flomax, ag: Void trial can be pursued at discretion of primary team           DVTppx: SCDs  GIppx: Pepcid  Code Status: Full Code  Diet: Cardiac  Activity: OOB to chair TID and PRN  Discharge: TBD  Ambulates: independently     Hospital Problems  Date Reviewed: 2022            Codes Class Noted POA    Infection of total knee replacement, initial encounter ([facility] [address] ICD-10-CM: T78.92NK, [id number]  ICD-9-CM: 996.66, V43.65  9/21/2022 Yes             Review of Systems:   A comprehensive review of systems was negative except for that written in the HPI. Vital Signs:    Last 24hrs VS reviewed since prior progress note. Most recent are:  Visit Vitals  BP (!) 145/74 (BP 1 Location: Left upper arm, BP Patient Position: Sitting)   Pulse 88   Temp 97.9 °F (36.6 °C)   Resp 16   Ht 5' 8\" (1.727 m)   Wt 85.3 kg (188 lb)   SpO2 95%   BMI 28.59 kg/m²         Intake/Output Summary (Last 24 hours) at 9/27/2022 0959  Last data filed at 9/27/2022 0011  Gross per 24 hour   Intake 200 ml   Output 1050 ml   Net -850 ml        Physical Examination:     I had a face to face encounter with this patient and independently examined them on 9/27/2022 as outlined below:       Constitutional:  No acute distress, cooperative, pleasant    ENT:  Oral mucosa moist.    Resp:  CTA bilaterally. No wheezing/rhonchi/rales. No accessory muscle use. CV:  Regular rhythm, normal rate, S1,S2.    GI/:  Soft, non distended, non tender, no guarding, BS present. Webb patent. Musculoskeletal:  No edema, warm. Right knee CDI. Neurologic:  Moves all extremities. AAOx3. Skin:  w/d. Psych:  Good insight, Not anxious nor agitated. Data Review:    Review and/or order of clinical lab test      Labs:   No results for input(s): WBC, HGB, HCT, PLT, HGBEXT, HCTEXT, PLTEXT in the last 72 hours. No results for input(s): NA, K, CL, CO2, BUN, CREA, GLU, CA, MG, PHOS, URICA in the last 72 hours. No results for input(s): ALT, AP, TBIL, TBILI, TP, ALB, GLOB, GGT, AML, LPSE in the last 72 hours. No lab exists for component: SGOT, GPT, AMYP, HLPSE  Recent Labs     09/27/22  0433 09/26/22  0320 09/25/22  0139   INR 2.0* 1.6* 1.3*   PTP 19.9* 16.1* 13.1*      No results for input(s): FE, TIBC, PSAT, FERR in the last 72 hours.    No results found for: FOL, RBCF   No results for input(s): PH, PCO2, PO2 in the last 72 hours. No results for input(s): CPK, CKNDX, TROIQ in the last 72 hours.     No lab exists for component: CPKMB  No results found for: CHOL, CHOLX, CHLST, CHOLV, HDL, HDLP, LDL, LDLC, DLDLP, TGLX, TRIGL, TRIGP, CHHD, CHHDX  Lab Results   Component Value Date/Time    Glucose (POC) 80 04/20/2021 10:56 AM     Lab Results   Component Value Date/Time    Color YELLOW/STRAW 09/16/2022 02:50 PM    Appearance CLOUDY (A) 09/16/2022 02:50 PM    Specific gravity 1.020 09/16/2022 02:50 PM    pH (UA) 7.0 09/16/2022 02:50 PM    Protein Negative 09/16/2022 02:50 PM    Glucose Negative 09/16/2022 02:50 PM    Ketone Negative 09/16/2022 02:50 PM    Bilirubin Negative 09/16/2022 02:50 PM    Urobilinogen 1.0 09/16/2022 02:50 PM    Nitrites Positive (A) 09/16/2022 02:50 PM    Leukocyte Esterase MODERATE (A) 09/16/2022 02:50 PM    Epithelial cells FEW 09/16/2022 02:50 PM    Bacteria 4+ (A) 09/16/2022 02:50 PM    WBC 10-20 09/16/2022 02:50 PM    RBC 0-5 09/16/2022 02:50 PM         Medications Reviewed:     Current Facility-Administered Medications   Medication Dose Route Frequency    warfarin (COUMADIN) tablet 5 mg  5 mg Oral ONCE    metoprolol tartrate (LOPRESSOR) tablet 50 mg  50 mg Oral BID    DAPTOmycin (CUBICIN) 500 mg in 0.9% sodium chloride 50 mL IVPB  500 mg IntraVENous Q24H    tamsulosin (FLOMAX) capsule 0.4 mg  0.4 mg Oral DAILY    ketorolac (TORADOL) injection 15 mg  15 mg IntraVENous Q8H PRN    traMADoL (ULTRAM) tablet 50 mg  50 mg Oral Q6H PRN    furosemide (LASIX) tablet 10 mg  10 mg Oral QHS    furosemide (LASIX) tablet 20 mg  20 mg Oral DAILY    [Held by provider] atorvastatin (LIPITOR) tablet 20 mg  20 mg Oral QHS    sodium chloride (NS) flush 5-40 mL  5-40 mL IntraVENous Q8H    sodium chloride (NS) flush 5-40 mL  5-40 mL IntraVENous PRN    acetaminophen (TYLENOL) tablet 500 mg  500 mg Oral Q4HWA    oxyCODONE IR (ROXICODONE) tablet 2.5 mg  2.5 mg Oral Q3H PRN    oxyCODONE IR (ROXICODONE) tablet 5 mg  5 mg Oral Q3H PRN    naloxone (NARCAN) injection 0.4 mg  0.4 mg IntraVENous PRN    hydrOXYzine HCL (ATARAX) tablet 10 mg  10 mg Oral Q8H PRN    senna-docusate (PERICOLACE) 8.6-50 mg per tablet 1 Tablet  1 Tablet Oral BID    polyethylene glycol (MIRALAX) packet 17 g  17 g Oral DAILY    bisacodyL (DULCOLAX) suppository 10 mg  10 mg Rectal DAILY PRN    cefTRIAXone (ROCEPHIN) 2 g in 0.9% sodium chloride 20 mL IV syringe  2 g IntraVENous Q24H    Warfarin- pharmacy to dose   Other Rx Dosing/Monitoring     ______________________________________________________________________  EXPECTED LENGTH OF STAY: 2d 14h  ACTUAL LENGTH OF STAY:          [address], [place]

## 2022-09-27 NOTE — PROGRESS NOTES
Slow steady progress. He has had some mild delirium. Urinary retention. He has had A. fib while here in the hospital.  He is now medicated per cardiology recommendations. Visit Vitals  BP (!) 145/74 (BP 1 Location: Left upper arm, BP Patient Position: Sitting)   Pulse 88   Temp 97.9 °F (36.6 °C)   Resp 16   Ht 5' 8\" (1.727 m)   Wt 188 lb (85.3 kg)   SpO2 95%   BMI 28.59 kg/m²     Alert  R knee dressing dry  Calves soft NT  Motor 5/5  Pulses symmetrcical    Multiple preoperative cultures grew strep; intraoperative cultures 1 out of 3 positive for CNS.     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 Viviane Davis; this will be followed by long-term oral suppression  -Coumadin (preoperative med)  -Leave Webb for now; will need voiding trial soon  -Medical treatment for atrial fibrillation per cardiology recommendations; will need cardiology follow-up  -Plan follow-up with me in approximately 3 weeks  -Follow up with Dr. Ty Barraza or Dr. Gwendolyn Morales MD

## 2022-09-27 NOTE — PROGRESS NOTES
Bedside shift change report given to Renetta Carlson (oncoming nurse) by Brock Buchanan (offgoing nurse). Report included the following information SBAR, Kardex, Intake/Output, and MAR.

## 2022-09-27 NOTE — PROGRESS NOTES
Occupational Therapy    Chart reviewed in prep for skilled OT; however, pt requested defer secondary to recently returning to bed prior to OT's arrival.  OT to defer treatment and follow up tomorrow, given pt's discharge to [facility] has been delayed this date.     Thank you,  Quan Carranza, OT

## 2022-09-27 NOTE — PROGRESS NOTES
Pharmacist Note - Warfarin Dosing  Consult provided for this 89 y.o.male to manage warfarin for h/o DVT/afib, now s/p R knee I&D w/poly exchange    INR Goal: 2 - 3  Risk factors: Age > 65  Daily INR ordered: YES    Recent Labs     09/27/22  0433 09/26/22  0320 09/25/22  0139   INR 2.0* 1.6* 1.3*     Date               INR                  Dose  9/21   1.8, 1.2 5 mg  9/22   1.3        5 mg  9/23   1.3        5 mg  9/24                1.3                   7 mg   9/25                1.3                   8 mg           9/26                1.6                   6 mg  9/27                2.0                   5 mg                                                                        Assessment/ Plan: Will order warfarin 5 mg PO x 1 dose considering overall trend in INR, h/o DVTs/AFib, home regimen    Pharmacy will continue to monitor daily and adjust therapy as indicated. Please contact the pharmacist at  for outpatient recommendations if needed.

## 2022-09-27 NOTE — PROGRESS NOTES
Attempted to flush PICC line. No success. Purple port with blood in the line. A second nurse attempted to flush. Charge nurse notified. [employer] sent to physician. New orders received to declot line.

## 2022-09-27 NOTE — DISCHARGE SUMMARY
Ortho Discharge Summary    Patient ID:  Bruno Rebolledo  [id number]  male  80 y.o.  4/23/1928    Admit date: 9/21/2022    Discharge date: 9/27/2022    Admitting Physician: Lilo Lamb MD     Consulting Physician(s):   Treatment Team: Attending Provider: Caitlyn Cruz MD; Consulting Provider: Bree Mckeon MD; Care Manager: Marcie Vega Consulting Provider: Lavonia Seip, MD; Student Nurse: Bridgett Queen; Occupational Therapist: Vickey Issa OT; Physical Therapist: Yossi Yoo    Date of Surgery:   9/21/2022     Preoperative Diagnosis:  STATUS POST REVISION RIGHT TOTAL KNEE REPLACEMENT/INFECTION RIGHT TOTAL KNEE REPLACEMENT    Postoperative Diagnosis:   STATUS POST REVISION RIGHT TOTAL KNEE     Procedure(s):   RIGHT KNEE INCISION AND DRAINAGE WITH POLYETHYLENE EXCHANGE (SPINAL/IV SED)     Anesthesia Type:   Spinal     Surgeon: Caitlyn Cruz MD                            HPI:  From OP Note: INDICATIONS:  The patient is a 66-year-old gentleman who underwent revision right total knee replacement just over a year ago for catastrophic failure of bearing surface, significant instability. He initially did well, but when he presented around the time of his 1-year followup visit, he was complaining of pain and swelling for at least 3-4 months previous to that. Sedimentation rate and C-reactive protein were elevated, and deep periprosthetic infection was identified and confirmed with multiple different aspiration samples. .  We had a very lengthy discussion regarding treatment options, with both the patient and his family. Options included irrigation and debridement with polyethylene exchange followed by IV antibiotic therapy and chronic suppression, versus formal 2-stage revision. At his age, 2-stage revision is not going to be well tolerated. .  We will agree that debridement with polyethylene exchange and IV therapy followed by chronic suppression would probably suit him best.  They understand the risk for treatment failure and worsening infection, with possible need for future surgery. They also understand increased risk for VTE due to the patient's multiple VTE events in the past.    PMH:   Past Medical History:   Diagnosis Date    Arthritis     DJD    Cancer ([facility] [address]     prostate    Cancer ([facility] [address]     SKIN CA RIGHT EYEBROW AREA    Chronic pain     History of colonoscopy with polypectomy 2005~    Hypercholesterolemia     Macular degeneration     Other ill-defined conditions(799.89)     cataracts    PAF (paroxysmal atrial fibrillation) (HCC)     Thromboembolus (HCC)     3 x left leg, 1 x right leg 1990'S       Body mass index is 28.59 kg/m². : A BMI > 30 is classified as obesity and > 40 is classified as morbid obesity. Medications upon admission :   Prior to Admission Medications   Prescriptions Last Dose Informant Patient Reported? Taking?   acetaminophen (TYLENOL) 500 mg tablet 8/21/2022  No Yes   Sig: Take 1 Tab by mouth every four (4) hours. Patient taking differently: Take 1,000 mg by mouth every four (4) hours. atorvastatin (LIPITOR) 20 mg tablet 9/20/2022  No Yes   Sig: Take 1 Tab by mouth daily. Indications: high cholesterol   celecoxib (CELEBREX) 200 mg capsule Unknown  Yes No   Sig: Take 200 mg by mouth daily. cholecalciferol (VITAMIN D3) (1000 Units /25 mcg) tablet 9/14/2022  Yes Yes   Sig: Take 1,000 Units by mouth daily. furosemide (LASIX) 20 mg tablet 9/20/2022  Yes Yes   Sig: Take  by mouth nightly. furosemide (LASIX) 40 mg tablet 9/20/2022  Yes Yes   Sig: Take 40 mg by mouth daily. latanoprost (XALATAN) 0.005 % ophthalmic solution 9/20/2022  Yes Yes   Sig: Administer 1 Drop to both eyes nightly. vit C/E/Zn/coppr/lutein/zeaxan (PRESERVISION AREDS-2 PO) 9/14/2022  Yes Yes   Sig: Take 1 Tablet by mouth two (2) times a day. warfarin (COUMADIN) 5 mg tablet   Yes Yes   Sig: Take 5 mg by mouth every evening.       Facility-Administered Medications: None        Allergies: Allergies   Allergen Reactions    Tape [Adhesive] Other (comments)     REDNESS, SKIN TEARS - BANDAID, PAPER TAPE        Hospital Course: The patient underwent surgery. Intraoperative cultures obtained. Complications:  None; patient tolerated the procedure well. Was taken to the PACU in stable condition and then transferred to the ortho floor with consult to infectious disease. ID started patient on ceftriaxone in post op period as outpatient cultures showing strep salivarius. On POD 1 (9/22), patient with noted hypotension requiring 500 mL bolus and continuation of maintenance IV fluids. Ag removed. On POD 2 (9/23), resumed home lasix. PICC line placed to right arm. Micro with preliminary results of GPCs called out with eventual growth of  CoNS. Given culture results, dapto  mg daily started. Recommendation for placement made given mobility progress, need for IV antibiotics, and patient support system with referrals sent by case management. Overnight into POD 3 (9/24), patient with noted tachycardia. Hospitalist consulted. EKG obtained: a fib with rate to 140s. Also with noted urinary retention requiring straight cath and then subsequent ag. Flomax started. Cardiology consulted. IV metoprolol 2.5 mg PO given with initiation of oral regimen. On POD 4 (9/25), updated ID recommendations made for treatment of Chronic right TKR infection--cultures negative, but grew strep in the outpatient setting--GPCcl from operative culture--coag negative staph. At discharge, patient to continue ceftriaxone, daptopmycin every 24 hours x 40 days via PICC. On POD 5 (9/26), patient again with AM tachycardia and confusion. Oral metoprolol increased to 50 mg PO BID. Head CT obtained on 9/26: no acute intracranial abnormalities. On POD 6 (9/27), patient cleared by cardiology and hospitalist.  Patient to transfer to [facility] following daily dapto dose.   Patient stated eventual goal of return to private residence. [employer] to follow patient through care transitions for ABX therapy needs. Perioperative Antibiotics:  Cefoxitin; long term ceftriaxone and daptomycin     Postoperative Pain Management:  Oxycodone & Tylenol     DVT Prophylaxis: Coumadin with INR goal of 2-3. INR at discharge: 2.0 on 9/27/22    Postoperative transfusions:    Number of units banked PRBCs =   none     Post Op complications: none    Hemoglobin at discharge:    Lab Results   Component Value Date/Time    HGB 9.7 (L) 09/27/2022 08:59 AM    INR 2.0 (H) 09/27/2022 04:33 AM       Aquacel dressing remained in place throughout hospitalization- clean, dry and intact. No significant erythema or swelling. Wound appears to be healing without any evidence of infection. Neurovascular exam found to be within normal limits. Physical Therapy started following surgery and participated in bed mobility, transfers and ambulation. Gait:  Gait  Base of Support: Widened, Shift to left  Speed/Angela: Fluctuations  Step Length: Left shortened  Swing Pattern: Right asymmetrical  Stance: Right decreased  Gait Abnormalities: Antalgic (forward flexed/RW too far forward)  Ambulation - Level of Assistance: Contact guard assistance, Minimal assistance (technique is not safe, needs cues)  Distance (ft): 100 Feet (ft)  Assistive Device: Walker, rolling, Gait belt  Interventions: Safety awareness training, Verbal cues (constant safety cues)            Interventions: Safety awareness training, Verbal cues (constant safety cues)      Discharged to: [facility]. Condition on Discharge:   stable    Discharge instructions:  - Anticoagulate with Coumadin, prior to admission medication  - Take pain medications as prescribed  - Resume pre hospital diet      - Discharge activity: activity as tolerated  - Ambulate with assistive device as needed. - Weight bearing status as tolerated  - Wound Care Keep wound clean and dry.   See discharge instruction sheet.  - Webb placed 9/24 with recommendation for void trial in 7 days at [facility]. If unsuccessful at [facility], recommending outpatient urology follow up for evaluation and void trial    IV Antibiotic Orders     1. 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 Routine PICC care  3. Antibiotic:  Ceftriaxone 2 grams IV Q 24 hours                         Daptomycin 500 mg IV Q 24 hours  4. Lab each Monday:             CBC/diff/platelets             BMP             CRP             CPK  5.  Lab each Thursday:             CBC/diff/platelets             BMP             CPK  6. Fax lab to Dr. Bhumika Clancy @ [phone].  7.  Call Dr. Bhumika Clancy @ [phone] for WBC under 4, CPK over 300 or creatinine over 2  8. Duration of therapy: 40 days from 9/23             Please call Dr. Bhumika Clancy @ [phone] before stopping therapy. 9.  Allergies: none to current regimen   10. Call [phone]           -DISCHARGE MEDICATION LIST     Current Discharge Medication List        START taking these medications    Details   DAPTOmycin (CUBICIN) IVPB 500 mg by IntraVENous route every twenty-four (24) hours for 40 days. Qty: 1 Dose, Refills: 0  Start date: 9/23/2022, End date: 11/2/2022      metoprolol tartrate (LOPRESSOR) 50 mg tablet Take 1 Tablet by mouth two (2) times a day for 30 days. Qty: 60 Tablet, Refills: 0  Start date: 9/27/2022, End date: 10/27/2022      tamsulosin (FLOMAX) 0.4 mg capsule Take 1 Capsule by mouth daily for 14 days. Qty: 14 Capsule, Refills: 0  Start date: 9/28/2022, End date: 10/12/2022      oxyCODONE IR (ROXICODONE) 5 mg immediate release tablet Take 0.5-1 Tablets by mouth every six (6) hours as needed for Pain for up to 7 days.  Max Daily Amount: 20 mg.  Qty: 28 Tablet, Refills: 0  Start date: 9/27/2022, End date: 10/4/2022    Associated Diagnoses: [facility]      senna-docusate (PERICOLACE) 8.6-50 mg per tablet Take 1 Tablet by mouth two (2) times a day for 30 days.  Qty: 60 Tablet, Refills: 0  Start date: 9/23/2022, End date: 10/23/2022      polyethylene glycol (MIRALAX) 17 gram packet Take 1 Packet by mouth daily for 14 days. Qty: 14 Packet, Refills: 0  Start date: 9/23/2022, End date: 10/7/2022      cefTRIAXone 2 gram 2 g IV syringe 2 g by IntraVENous route every twenty-four (24) hours for 40 days. Qty: 1 Dose, Refills: 0  Start date: 9/24/2022, End date: 11/3/2022           CONTINUE these medications which have CHANGED    Details   acetaminophen (TYLENOL) 500 mg tablet Take 1 Tablet by mouth every four (4) hours (while awake). Qty: 100 Tablet, Refills: 0  Start date: 9/23/2022           CONTINUE these medications which have NOT CHANGED    Details   warfarin (COUMADIN) 5 mg tablet Take 5 mg by mouth every evening. !! furosemide (LASIX) 40 mg tablet Take 40 mg by mouth daily. !! furosemide (LASIX) 20 mg tablet Take  by mouth nightly. vit C/E/Zn/coppr/lutein/zeaxan (PRESERVISION AREDS-2 PO) Take 1 Tablet by mouth two (2) times a day. latanoprost (XALATAN) 0.005 % ophthalmic solution Administer 1 Drop to both eyes nightly. cholecalciferol (VITAMIN D3) (1000 Units /25 mcg) tablet Take 1,000 Units by mouth daily. !! - Potential duplicate medications found. Please discuss with provider.         STOP taking these medications       atorvastatin (LIPITOR) 20 mg tablet Comments:   Reason for Stopping:         celecoxib (CELEBREX) 200 mg capsule Comments:   Reason for Stopping:            per medical continuation form      - Follow up in office in 3 weeks with Dr. Treasure Bazzi  - Follow up with ID (Ariel/Dawna) in 2-3 weeks  - Follow up with Dr Pooja Nur, cardiologist  - Follow up with PCP after discharge from [facility]  - Voiding trial at [facility] but if unsuccessful, will require outpatient urology follow up (established patient at [facility])      Signed:  Lisbet Rodriguez NP  Orthopaedic Nurse Practitioner    9/27/2022  2:36 PM

## 2022-09-27 NOTE — PROGRESS NOTES
Problem: Mobility Impaired (Adult and Pediatric)  Goal: *Acute Goals and Plan of Care (Insert Text)  Description: FUNCTIONAL STATUS PRIOR TO ADMISSION: Patient was independent and active without use of DME.    HOME SUPPORT PRIOR TO ADMISSION: The patient lived alone with daughter to provide intermittent assistance. Patient was independent with ADLs, daughter traveled 2x/week to assist with IADLs and check on him. Physical Therapy Goals  Initiated 9/22/2022    1. Patient will move from supine to sit and sit to supine , scoot up and down, and roll side to side in bed with modified independence within 4 days. 2. Patient will perform sit to stand with modified independence within 4 days. 3. Patient will ambulate with modified independence for 150 feet with the least restrictive device within 4 days. 4. Patient will ascend/descend 4 stairs with cane and one handrail(s) with modified independence within 4 days. 5. Patient will perform home exercise program per protocol with independence within 4 days. 6. Patient will demonstrate AROM 0-90 degrees in operative joint within 4 days. Outcome: Progressing Towards Goal   PHYSICAL THERAPY TREATMENT  Patient: Tray Cooney (87 y.o. male)  Date: 9/27/2022  Diagnosis: Infection of total knee replacement, initial encounter ([facility] [address] [T84.59XA, Z96.659] <principal problem not specified>  Procedure(s) (LRB):  RIGHT KNEE INCISION AND DRAINAGE WITH POLYETHYLENE EXCHANGE (SPINAL/IV SED) (Right) 6 Days Post-Op  Precautions: Fall, WBAT  Chart, physical therapy assessment, plan of care and goals were reviewed. ASSESSMENT  Patient continues with skilled PT services and is progressing towards goals. Patient presents up in recliner, reading newspaper, eager to participate in PT. Patient performed sit-stand-sit from recliner with cues, ambulated 100 ft with RW and gait belt, speed fluctuating, leaning too far forward, RW too far in front of him.  Unsafe stand-sit, says \"I know\", but does not back all the way up to chair, doesn't reach back for chair arms, etc.  Making progress with mobility, but limited safety awareness and gait still unsteady. Difficult sit-stand form chair (lower surface). Current Level of Function Impacting Discharge (mobility/balance): Minimal assistance for sit-stand/contact guard-minimal for gait. 100 ft with RW and gait belt, gait antalgic, slightly unsteady, fluctuating speeds and RW too far in front of him. Other factors to consider for discharge: Lives alone/Unsteady Gait and high fall risk. PLAN :  Patient continues to benefit from skilled intervention to address the above impairments. Continue treatment per established plan of care. to address goals. Recommendation for discharge: (in order for the patient to meet his/her long term goals)  Therapy 3 hours per day 5-7 days per week    This discharge recommendation:  Has been made in collaboration with the attending provider and/or case management    IF patient discharges home will need the following DME: patient owns DME required for discharge       SUBJECTIVE:   Patient stated I need some therapy for walking! [place]    OBJECTIVE DATA SUMMARY:   Critical Behavior:  Neurologic State: Alert  Orientation Level: Oriented X4  Cognition: Memory loss  Safety/Judgement: Awareness of environment, Fall prevention  Functional Mobility Training:  Bed Mobility:     Supine to Sit:  (presented up in chair)  Sit to Supine:  (returned to chair)           Transfers:  Sit to Stand: Minimum assistance; Moderate assistance;Assist x1 (from recliner (lower surface))  Stand to Sit: Contact guard assistance (cues for safety!!!! \"flops\" into chair)                             Balance:  Sitting: Intact  Standing: Impaired  Standing - Static: Good;Constant support  Standing - Dynamic : Good;Fair;Constant support  Ambulation/Gait Training:  Distance (ft): 100 Feet (ft)  Assistive Device: Walker, rolling;Gait belt  Ambulation - Level of Assistance: Contact guard assistance;Minimal assistance (technique is not safe, needs cues)        Gait Abnormalities: Antalgic (forward flexed/RW too far forward)  Right Side Weight Bearing: As tolerated     Base of Support: Widened;Shift to left  Stance: Right decreased  Speed/Angela: Fluctuations  Step Length: Left shortened  Swing Pattern: Right asymmetrical     Interventions: Safety awareness training;Verbal cues (constant safety cues)                     Therapeutic Exercises:   Rewrote exercises on the board and reminded him to perform  Ankle Pumps  Ham Sets  Quad Sets  Heel Slides  X 10 each, 5-7x daily      Pain Ratin/10    Activity Tolerance:   Good    After treatment patient left in no apparent distress:   Sitting in chair, Call bell within reach, Bed / chair alarm activated, Caregiver / family present, and nurse notified. COMMUNICATION/COLLABORATION:   The patients plan of care was discussed with: Occupational therapist, Registered nurse, and Case management.      Crystal Mistry   Time Calculation: 25 mins

## 2022-09-27 NOTE — PROGRESS NOTES
ID Progress Note  2022    Subjective:     Much better day today    Objective:     Antibiotics:  Ceftriaxone   Daptomycin       Vitals: Visit Vitals  /74 (BP 1 Location: Left upper arm, BP Patient Position: At rest)   Pulse 89   Temp 97.3 °F (36.3 °C)   Resp 16   Ht 5' 8\" (1.727 m)   Wt 85.3 kg (188 lb)   SpO2 93%   BMI 28.59 kg/m²        Tmax:  Temp (24hrs), Av.6 °F (36.4 °C), Min:97.3 °F (36.3 °C), Max:97.9 °F (36.6 °C)      Exam:  Lungs:  clear to auscultation bilaterally  Heart:  regular rate and rhythm  Abdomen:  soft, non-tender. Bowel sounds normal. No masses,  no organomegaly  Wound dressed and dry    Labs:      Recent Labs     22  0859   WBC 7.3   HGB 9.7*      BUN 18   CREA 1.14   AP 76   TBILI 0.4       Cultures:     Lab Results   Component Value Date/Time    Specimen Description: [facility] 2011 04:10 PM    Specimen Description: [facility] 2011 03:52 PM     Lab Results   Component Value Date/Time    Culture result: No growth (<1,000 CFU/ML) 2022 03:00 PM    Culture result: No growth thus far, holding 14 days. 2022 02:08 PM    Culture result: NO GROWTH ON SOLID MEDIA THUS FAR 2022 02:08 PM    Culture result: (A) 2022 02:08 PM     STAPHYLOCOCCUS SPECIES, COAGULASE NEGATIVE ISOLATED FROM THIO BROTH ONLY DR. [facility] REQUESTED WORK UP 22    Culture result:  2022 02:08 PM     (NOTE) [facility] CALLED TO Hugo Ricketts RN ON 22 AT 1431 BY DG    Culture result: No growth thus far, holding 14 days.  2022 02:08 PM       Radiology:     Line/Insert Date:           Assessment:     Chronic right TKR infection--cultures negative, but grew strep in the outpatient setting--GPCcl from operative culture--coag negative staph--have asked the lab to work up  [address] [facility]   S/P OR    Objective:     Continue current therapy  Follow up cultures and studies  Plan for long-term antibiotic therapy  Keep in house until final culture results available  Discussed     Oziel Ramirez Christina Torres MD

## 2022-09-27 NOTE — PROGRESS NOTES
Cardiology Progress Note  2022    Admit Date: 2022  Admit Diagnosis: Infection of total [facility], [facility] [T84.59XA, Z96.659]  CC:  AF    Assessment/Recommendations:  Paroxysmal AF with RVR - prior cardioversion in   - continue rate control strategy for now. Continue metoprolol 50 mg BID. - on warfarin     2. H/o cardiomyopathy - LVEF reduced to 30-35% previously but recovered to 60-65% in   - appears euvolemic currently  - continue home lasix     3. S/p right TKR I&D  - on antibiotics per ID     4. H/o recurrent DVT  - home warfarin has been restarted    5. Delirium    Ok to discharge from cardiac standpoint. Follow up with Dr Pooja Nur at discharge. Thank you for this consult. Will be available as needed. Please contact us for any further questions or concerns. Hospital problem list     Active Problems:    Infection of total knee replacement, initial encounter ([facility] [address] (2022)                                                        Subjective:     Sleeping this morning. Per RN, still with confusion overnight. Rates reasonably controlled. ROS: All other systems reviewed and were negative other than mentioned above. No change in family and social history from H&P/Consult note.     Objective:    Physical Exam:  24 hr VS reviewed, overall VSSAF  Temp (24hrs), Av.7 °F (36.5 °C), Min:97.3 °F (36.3 °C), Max:98.1 °F (36.7 °C)    Patient Vitals for the past 8 hrs:   Pulse   22 0603 96   22 0433 (!) 112   22 0400 92   22 0200 (!) 112   22 2355 (!) 101    Patient Vitals for the past 8 hrs:   Resp   22 0433 18   22 2355 16    Patient Vitals for the past 8 hrs:   BP   22 0433 125/83   22 2355 138/76          Intake/Output Summary (Last 24 hours) at 2022 0726  Last data filed at 2022 0011  Gross per 24 hour   Intake 500 ml   Output 1350 ml   Net -850 ml       General: resting comfortably in no distress, elderly  HEENT: sclera anicteric, moist mucous membranes  Neck: supple, no JVD or HJR  CV: irregular rate and rhythm, distant heart sounds  Lungs: no respiratory distress, diminished BS bilateral bases  Abdomen: soft, non distended, non tender  MSK: dressing over right knee, right knee swelling., no left lower extremity edema  Skin: warm to touch  Neuro: sleeping  Psych: calm    Data Review:  Lab results reviewed as noted below. Current medications reviewed as noted below. Telemetry independently reviewed : []  sinus    []  chronic afib     [x]  par afib  rates  []  NSVT    ECG independently reviewed: []  NSR    []  no significant changes   [x]  no new ECG provided for review    No results for input(s): PH, PCO2, PO2 in the last 72 hours. No results for input(s): CPK, CKMB in the last 72 hours. No lab exists for component: TROPONINI    No results for input(s): NA, K, CL, CO2, BUN, CREA, GLU, PHOS, CA, ALB, WBC, HGB, HCT, PLT, HGBEXT, HCTEXT, PLTEXT, HGBEXT, HCTEXT, PLTEXT in the last 72 hours. No results for input(s): AP, TBIL, TBILI, TP, ALB, GLOB, GGT, AML, LPSE in the last 72 hours. No lab exists for component: SGOT, GPT, AMYP, HLPSE    Recent Labs     09/27/22  0433 09/26/22  0320 09/25/22  0139   INR 2.0* 1.6* 1.3*   PTP 19.9* 16.1* 13.1*      No results for input(s): FE, TIBC, PSAT, FERR in the last 72 hours.    Lab Results   Component Value Date/Time    Glucose (POC) 80 04/20/2021 10:56 AM       Current Facility-Administered Medications   Medication Dose Route Frequency    metoprolol tartrate (LOPRESSOR) tablet 50 mg  50 mg Oral BID    DAPTOmycin (CUBICIN) 500 mg in 0.9% sodium chloride 50 mL IVPB  500 mg IntraVENous Q24H    tamsulosin (FLOMAX) capsule 0.4 mg  0.4 mg Oral DAILY    ketorolac (TORADOL) injection 15 mg  15 mg IntraVENous Q8H PRN    traMADoL (ULTRAM) tablet 50 mg  50 mg Oral Q6H PRN furosemide (LASIX) tablet 10 mg  10 mg Oral QHS    furosemide (LASIX) tablet 20 mg  20 mg Oral DAILY    [Held by provider] atorvastatin (LIPITOR) tablet 20 mg  20 mg Oral QHS    sodium chloride (NS) flush 5-40 mL  5-40 mL IntraVENous Q8H    sodium chloride (NS) flush 5-40 mL  5-40 mL IntraVENous PRN    acetaminophen (TYLENOL) tablet 500 mg  500 mg Oral Q4HWA    oxyCODONE IR (ROXICODONE) tablet 2.5 mg  2.5 mg Oral Q3H PRN    oxyCODONE IR (ROXICODONE) tablet 5 mg  5 mg Oral Q3H PRN    naloxone (NARCAN) injection 0.4 mg  0.4 mg IntraVENous PRN    hydrOXYzine HCL (ATARAX) tablet 10 mg  10 mg Oral Q8H PRN    senna-docusate (PERICOLACE) 8.6-50 mg per tablet 1 Tablet  1 Tablet Oral BID    polyethylene glycol (MIRALAX) packet 17 g  17 g Oral DAILY    bisacodyL (DULCOLAX) suppository 10 mg  10 mg Rectal DAILY PRN    cefTRIAXone (ROCEPHIN) 2 g in 0.9% sodium chloride 20 mL IV syringe  2 g IntraVENous Q24H    Warfarin- pharmacy to dose   Other Rx Dosing/Monitoring         Elbert Anne, [address] [facility]  09/27/22

## 2022-09-27 NOTE — PROGRESS NOTES
Bedside shift change report given to Sebastian Ramos RN (oncoming nurse) by Tristin Harding RN(offgoing nurse). Report given with SBAR.

## 2022-09-27 NOTE — PROGRESS NOTES
Transition of Care: Patient has been accepted at [facility], pending medical stability. ID is following, cultures are pending. Patient has PICC line that was placed on 9/23. BLS transport at discharge. The daughter Govind Winkler #[phone] needs to be updated with discharge plans. BELLA was notified that patient is ready for discharge to [facility]. CM called Yamile Gaines with [facility] [phone], they can accept patient at 7 PM. Patient needs to get dose of abx here prior to discharge. Medicare pt has received, reviewed, and signed 2nd IM letter informing them of their right to appeal the discharge. Signed copy has been placed on pt bedside chart. UPDATE: BELLA spoke with Dr. Andreas Gil, patient is not medically stable for discharge today. CM notified Yamile Gaines with [facility]. Care management will continue to follow.      Billy Moss, MARCELLOW/CRM

## 2022-09-28 LAB
BACTERIA SPEC CULT: NORMAL
GRAM STN SPEC: NORMAL
GRAM STN SPEC: NORMAL
INR PPP: 2.4 (ref 0.9–1.1)
PROTHROMBIN TIME: 24.2 SEC (ref 9–11.1)
SERVICE CMNT-IMP: NORMAL
SERVICE CMNT-IMP: NORMAL

## 2022-09-28 PROCEDURE — 97110 THERAPEUTIC EXERCISES: CPT

## 2022-09-28 PROCEDURE — 74011250637 HC RX REV CODE- 250/637: Performed by: PHYSICIAN ASSISTANT

## 2022-09-28 PROCEDURE — 74011250636 HC RX REV CODE- 250/636: Performed by: PHYSICIAN ASSISTANT

## 2022-09-28 PROCEDURE — 74011000250 HC RX REV CODE- 250: Performed by: PHYSICIAN ASSISTANT

## 2022-09-28 PROCEDURE — 74011250636 HC RX REV CODE- 250/636: Performed by: INTERNAL MEDICINE

## 2022-09-28 PROCEDURE — 85610 PROTHROMBIN TIME: CPT

## 2022-09-28 PROCEDURE — 74011000258 HC RX REV CODE- 258: Performed by: INTERNAL MEDICINE

## 2022-09-28 PROCEDURE — 74011250637 HC RX REV CODE- 250/637: Performed by: STUDENT IN AN ORGANIZED HEALTH CARE EDUCATION/TRAINING PROGRAM

## 2022-09-28 PROCEDURE — 97535 SELF CARE MNGMENT TRAINING: CPT

## 2022-09-28 PROCEDURE — 65270000046 HC RM TELEMETRY

## 2022-09-28 PROCEDURE — 74011250637 HC RX REV CODE- 250/637

## 2022-09-28 PROCEDURE — 97116 GAIT TRAINING THERAPY: CPT

## 2022-09-28 PROCEDURE — 74011250637 HC RX REV CODE- 250/637: Performed by: FAMILY MEDICINE

## 2022-09-28 PROCEDURE — 36415 COLL VENOUS BLD VENIPUNCTURE: CPT

## 2022-09-28 RX ORDER — WARFARIN SODIUM 5 MG/1
5 TABLET ORAL ONCE
Status: COMPLETED | OUTPATIENT
Start: 2022-09-28 | End: 2022-09-28

## 2022-09-28 RX ADMIN — WARFARIN SODIUM 5 MG: 5 TABLET ORAL at 12:16

## 2022-09-28 RX ADMIN — METOPROLOL TARTRATE 50 MG: 50 TABLET ORAL at 18:27

## 2022-09-28 RX ADMIN — CEFTRIAXONE SODIUM 2 G: 2 INJECTION, POWDER, FOR SOLUTION INTRAMUSCULAR; INTRAVENOUS at 23:02

## 2022-09-28 RX ADMIN — TAMSULOSIN HYDROCHLORIDE 0.4 MG: 0.4 CAPSULE ORAL at 08:37

## 2022-09-28 RX ADMIN — ACETAMINOPHEN 500 MG: 325 TABLET, FILM COATED ORAL at 23:02

## 2022-09-28 RX ADMIN — ACETAMINOPHEN 500 MG: 325 TABLET, FILM COATED ORAL at 16:06

## 2022-09-28 RX ADMIN — METOPROLOL TARTRATE 50 MG: 50 TABLET ORAL at 08:37

## 2022-09-28 RX ADMIN — TRAMADOL HYDROCHLORIDE 50 MG: 50 TABLET ORAL at 12:16

## 2022-09-28 RX ADMIN — CEFTRIAXONE SODIUM 2 G: 2 INJECTION, POWDER, FOR SOLUTION INTRAMUSCULAR; INTRAVENOUS at 01:18

## 2022-09-28 RX ADMIN — ACETAMINOPHEN 500 MG: 325 TABLET, FILM COATED ORAL at 06:37

## 2022-09-28 RX ADMIN — SODIUM CHLORIDE, PRESERVATIVE FREE 10 ML: 5 INJECTION INTRAVENOUS at 23:02

## 2022-09-28 RX ADMIN — SODIUM CHLORIDE, PRESERVATIVE FREE 10 ML: 5 INJECTION INTRAVENOUS at 06:36

## 2022-09-28 RX ADMIN — DAPTOMYCIN 500 MG: 500 INJECTION, POWDER, LYOPHILIZED, FOR SOLUTION INTRAVENOUS at 18:28

## 2022-09-28 RX ADMIN — FUROSEMIDE 20 MG: 20 TABLET ORAL at 12:16

## 2022-09-28 RX ADMIN — FUROSEMIDE 10 MG: 20 TABLET ORAL at 23:03

## 2022-09-28 NOTE — PROGRESS NOTES
Ortho NP Note    POD# 7  s/p RIGHT KNEE INCISION AND DRAINAGE WITH POLYETHYLENE EXCHANGE (SPINAL/IV SED)   Pt seen with Dr Rebeca Pickering    Pt seated in chair, eating breakfast.  Inquiring on status of transfer to SNF with continued desire to return to daughters home or ideally residence in [place] as soon as possible. Pain has remained manageable using primarily tylenol. Patient denies CP, SOB. No palpitations. No dizziness, lightheadedness. VSS Afebrile. Monitor reviewed with HR 80s-100s, persistent a fib. Visit Vitals  /75 (BP 1 Location: Left upper arm, BP Patient Position: Lying)   Pulse 82   Temp 98.2 °F (36.8 °C)   Resp 18   Ht 5' 8\" (1.727 m)   Wt 85.3 kg (188 lb)   SpO2 96%   BMI 28.59 kg/m²       Voiding status: ag in place  Output (mL)  Urine Voided: 225 ml (09/21/22 1600)  Last Bowel Movement Date: 09/27/22 (09/28/22 0829)  Unmeasurable Output  Stool Occurrence(s): 1 (09/28/22 0800)  Straight Cath  Straight Cath: Nurse performed cath (09/24/22 0314)  Number of Attempts: 1 (09/24/22 0314)  Catheter Size: 12 FR (09/24/22 0314)  Time Catheter Inserted: 4051 (09/24/22 0314)  Time Catheter Removed: 0310 (09/24/22 0314)  Urine: 650 mL (09/24/22 0314)      Labs    Lab Results   Component Value Date/Time    HGB 9.7 (L) 09/27/2022 08:59 AM      Lab Results   Component Value Date/Time    INR 2.4 (H) 09/28/2022 01:24 AM      Lab Results   Component Value Date/Time    Sodium 139 09/27/2022 08:59 AM    Potassium 3.9 09/27/2022 08:59 AM    Chloride 109 (H) 09/27/2022 08:59 AM    CO2 25 09/27/2022 08:59 AM    Glucose 122 (H) 09/27/2022 08:59 AM    BUN 18 09/27/2022 08:59 AM    Creatinine 1.14 09/27/2022 08:59 AM    Calcium 8.7 09/27/2022 08:59 AM     Recent Glucose Results: No results found for: GLU, GLUPOC, GLUCPOC        Body mass index is 28.59 kg/m². : A BMI > 30 is classified as obesity and > 40 is classified as morbid obesity.        Aquacel to right knee c.d.i  Cryotherapy in place over incision  Calves soft and supple; No pain with passive stretch  Bilateral LEs warm, dry. 2+ DP pulses. Sensation and motor intact - PF/DF/EHL intact 5/5  Foot Pumps for mechanical DVT proph while in bed     PLAN:  1) PT: BID WBAT. Therapy ongoing at [facility] after discharge  2) Anticoagulation:  Coumadin for DVT Prophylaxis,  pharmacy to dose. INR today: 2.4. Encouraged early mobilization, bed exercises, and SCD use. 3) Pain - Multimodal approach including cryotherapy, scheduled Tylenol with  PRN  tramadol, oxycodone. 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 Intra operative cultures: coag negative staph from thio broth, still preliminary (pending final for discharge). Pre op culture: S salivarius. Currently receiving ceftriaxone, daptomycin via right arm PICC line. 5) POUR: Webb to remain in place at discharge with plan for voiding trial at [facility]; continue flomax  6) Atrial fibrillation: Appreciate cardiology and hospitalist input--both cleared for discharge yesterday (9/27). Continue lopressor 50 mg PO BID, lasix. Recommending PCP and cardiology follow up. 7) Post operative anemia:  Hgb now stable in post operative period, Hgb on POD 6: 9.7. Expected Acute blood loss post-op anemia, continue to monitor. 8) Post op care: Continue OBR, encouraged IS. Follow up in 3 weeks with Dr Chele Pinto. Aquacel to remain in place x 7 days unless integrity is lost--removal today. 9) Readiness for discharge:     [x] Vital Signs stable    [x] Hgb stable    [x] + Voiding    [x] Wound intact, drainage minimal    [x] Tolerating PO intake     [x] Cleared by PT (OT if applicable) to [facility]    [] Stair training completed (if applicable)    [] Independent / Contact Guard Assist (household distance)     [] Bed mobility     [] Car transfers     [] ADLs    [x] Adequate pain control on oral medication alone     Discharge to [facility] once final cultures obtained as per ID. Plan for [facility].     Yin Or, NP  Available via [employer]    Discussed with Dr. Augustine Quinones

## 2022-09-28 NOTE — DISCHARGE SUMMARY
Ortho Discharge Summary     Patient ID:  Moisés Akers  [id number]  male  80 y.o.  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 He initially did well, but when he presented around the time of his 1-year followup visit, he was complaining of pain and swelling for at least 3-4 months previous to that. Sedimentation rate and C-reactive protein were elevated, and deep periprosthetic infection was identified and confirmed with multiple different aspiration samples. .  We had a very lengthy discussion regarding treatment options, with both the patient and his family. Options included irrigation and debridement with polyethylene exchange followed by IV antibiotic therapy and chronic suppression, versus formal 2-stage revision. At his age, 2-stage revision is not going to be well tolerated. .  We will agree that debridement with polyethylene exchange and IV therapy followed by chronic suppression would probably suit him best.  They understand the risk for treatment failure and worsening infection, with possible need for future surgery. They also understand increased risk for VTE due to the patient's multiple VTE events in the past.     PMH:        Past Medical History:   Diagnosis Date    Arthritis       DJD    Cancer ([facility] [address]       prostate    Cancer ([facility] [address]       SKIN CA RIGHT EYEBROW AREA    Chronic pain      History of colonoscopy with polypectomy 2005~    Hypercholesterolemia      Macular degeneration      Other ill-defined conditions(799.89)       cataracts    PAF (paroxysmal atrial fibrillation) (HCC)      Thromboembolus (HCC)       3 x left leg, 1 x right leg 1990'S         Body mass index is 28.59 kg/m². : A BMI > 30 is classified as obesity and > 40 is classified as morbid obesity. Medications upon admission :   Prior to Admission Medications   Prescriptions Last Dose Informant Patient Reported? Taking?   acetaminophen (TYLENOL) 500 mg tablet 8/21/2022   No Yes   Sig: Take 1 Tab by mouth every four (4) hours. Patient taking differently: Take 1,000 mg by mouth every four (4) hours. atorvastatin (LIPITOR) 20 mg tablet 9/20/2022   No Yes   Sig: Take 1 Tab by mouth daily. Indications: high cholesterol   celecoxib (CELEBREX) 200 mg capsule Unknown   Yes No   Sig: Take 200 mg by mouth daily. cholecalciferol (VITAMIN D3) (1000 Units /25 mcg) tablet 9/14/2022   Yes Yes   Sig: Take 1,000 Units by mouth daily. furosemide (LASIX) 20 mg tablet 9/20/2022   Yes Yes   Sig: Take  by mouth nightly. furosemide (LASIX) 40 mg tablet 9/20/2022   Yes Yes   Sig: Take 40 mg by mouth daily. latanoprost (XALATAN) 0.005 % ophthalmic solution 9/20/2022   Yes Yes   Sig: Administer 1 Drop to both eyes nightly. vit C/E/Zn/coppr/lutein/zeaxan (PRESERVISION AREDS-2 PO) 9/14/2022   Yes Yes   Sig: Take 1 Tablet by mouth two (2) times a day. warfarin (COUMADIN) 5 mg tablet     Yes Yes   Sig: Take 5 mg by mouth every evening.       Facility-Administered Medications: None         Allergies: Allergies   Allergen Reactions    Tape [Adhesive] Other (comments)       REDNESS, SKIN TEARS - BANDAID, PAPER TAPE         Hospital Course: The patient underwent surgery. Intraoperative cultures obtained. Complications:  None; patient tolerated the procedure well. Was taken to the PACU in stable condition and then transferred to the ortho floor with consult to infectious disease. ID started patient on ceftriaxone in post op period as outpatient cultures showing strep salivarius. On POD 1 (9/22), patient with noted hypotension requiring 500 mL bolus and continuation of maintenance IV fluids. Ag removed. On POD 2 (9/23), resumed home lasix. PICC line placed to right arm. Micro with preliminary results of GPCs called out with eventual growth of  CoNS. Given culture results, dapto  mg daily started. Recommendation for placement made given mobility progress, need for IV antibiotics, and patient support system with referrals sent by case management. Overnight into POD 3 (9/24), patient with noted tachycardia. Hospitalist consulted. EKG obtained: a fib with rate to 140s. Also with noted urinary retention requiring straight cath and then subsequent ag. Flomax started. Cardiology consulted. IV metoprolol 2.5 mg PO given with initiation of oral regimen. On POD 4 (9/25), updated ID recommendations made for treatment of Chronic right TKR infection--cultures negative, but grew strep in the outpatient setting--GPCcl from operative culture--coag negative staph. At discharge, patient to continue ceftriaxone, daptopmycin every 24 hours x 40 days via PICC. On POD 5 (9/26), patient again with AM tachycardia and confusion. Oral metoprolol increased to 50 mg PO BID. Head CT obtained on 9/26: no acute intracranial abnormalities.       On POD 6 (9/27), patient cleared by cardiology and hospitalist. However, due to culture growth, ID plan not finalized. Pending sensitivities. On POD 8 (9/29), ID plan updated to ceftriaxone only based on micro results. Patient stated eventual goal of return to private residence. [facility] to follow patient through care transitions for ABX therapy needs. Perioperative Antibiotics:  Cefoxitin; long term ceftriaxone      Postoperative Pain Management:  Oxycodone & Tylenol      DVT Prophylaxis: Coumadin with INR goal of 2-3. INR at discharge: 2.4 on 9/27/22     Postoperative transfusions:    Number of units banked PRBCs =   none      Post Op complications: none     Hemoglobin at discharge:          Lab Results   Component Value Date/Time     HGB 9.7 (L) 09/27/2022 08:59 AM     Aquacel dressing remained in place throughout hospitalization- clean, dry and intact. No significant erythema or swelling. Wound appears to be healing without any evidence of infection. Neurovascular exam found to be within normal limits. Physical Therapy started following surgery and participated in bed mobility, transfers and ambulation.          Gait:  Gait  Base of Support: Widened, Shift to left  Speed/Angela: Fluctuations  Step Length: Left shortened  Swing Pattern: Right asymmetrical  Stance: Right decreased  Gait Abnormalities: Antalgic (forward flexed/RW too far forward)  Ambulation - Level of Assistance: Contact guard assistance, Minimal assistance (technique is not safe, needs cues)  Distance (ft): 100 Feet (ft)  Assistive Device: Walker, rolling, Gait belt  Interventions: Safety awareness training, Verbal cues (constant safety cues)            Interventions: Safety awareness training, Verbal cues (constant safety cues)      Micro at discharge  Results       Procedure Component Value Units Date/Time    CULTURE, URINE [[id number]] Collected: 09/26/22 1500    Order Status: Completed Specimen: Cath Urine Updated: 09/27/22 1338     Special Requests: NO SPECIAL REQUESTS        Culture result: No growth (<1,000 CFU/ML) CULTURE, ANAEROBIC [[id number]] Collected: 09/21/22 1408    Order Status: Completed Specimen: Knee  Updated: 09/28/22 0651     Special Requests: RIGHT KNEE SYNOVIAL FLD 2     Culture result:       NO ANAEROBES ISOLATED SO FAR HOLDING FOR 14 DAYS          CULTURE, WOUND Deepali Santamaria STAIN [[id number]]  (Abnormal)  (Susceptibility) Collected: 09/21/22 1408    Order Status: Completed Specimen: Knee  Updated: 09/29/22 0830     Special Requests: RIGHT KNEE SYNOVIAL FLD 2     GRAM STAIN OCCASIONAL WBCS SEEN         NO ORGANISMS SEEN        Culture result:       STAPHYLOCOCCUS EPIDERMIDIS ISOLATED FROM THIO BROTH ONLY            NO GROWTH ON SOLID MEDIA THUS FAR      (NOTE) GPC CALLED TO Imelda Wilkins RN ON 9/23/22 AT 1431 BY DG    Susceptibility        Staphylococcus epidermidis      MALENA (Preliminary)      Ciprofloxacin ($) Susceptible      Clindamycin ($) Susceptible      Daptomycin ($$$$$) Susceptible      Erythromycin ($$$$) Resistant      Gentamicin ($) Susceptible      Inducible Clindamycin Susceptible      Levofloxacin ($) Susceptible      Linezolid ($$$$$) Susceptible      Oxacillin Susceptible      Rifampin ($$$$) Susceptible  [1]       Tetracycline Resistant      Trimeth/Sulfa Susceptible      Vancomycin ($) Susceptible                   [1]  Rifampin is not to be used for mono-therapy. CULTURE, ANAEROBIC [[id number]] Collected: 09/21/22 1408    Order Status: No result Updated: 09/21/22 1807    CULTURE, Louis Belling STAIN [[id number]] Collected: 09/21/22 1408    Order Status: Completed Specimen: Knee  Updated: 09/22/22 1338     Special Requests: RIGHT KNEE SYNOVIUM     GRAM STAIN RARE WBCS SEEN         NO ORGANISMS SEEN        Culture result:       No growth thus far, holding 14 days.           CULTURE, ANAEROBIC [[id number]] Collected: 09/21/22 1407    Order Status: Completed Specimen: Knee  Updated: 09/28/22 0757     Special Requests: RIGHT KNEE SYNOVIAL FLD     Culture result:       Cutibacterium acnes ISOLATED FROM THIO BROTH ONLY            NO GROWTH ON SOLID MEDIA AT 7 DAYS    CULTURE, Dorla Sicilian STAIN [[id number]] Collected: 09/21/22 1407    Order Status: Completed Specimen: Knee  Updated: 09/28/22 0755     Special Requests: RIGHT KNEE SYNOVIAL FLD     GRAM STAIN OCCASIONAL WBCS SEEN         NO ORGANISMS SEEN        Culture result: NO GROWTH 7 DAYS       CULTURE, BLOOD [[id number]] Collected: 09/16/22 1450    Order Status: Completed Specimen: Blood Updated: 09/22/22 0615     Special Requests: NO SPECIAL REQUESTS        Culture result: NO GROWTH 6 DAYS       CULTURE, BLOOD [[id number]] Collected: 09/16/22 1450    Order Status: Completed Specimen: Blood Updated: 09/22/22 0615     Special Requests: NO SPECIAL REQUESTS        Culture result: NO GROWTH 6 DAYS       CULTURE, MRSA [[id number]] Collected: 09/16/22 1450    Order Status: Completed Specimen: Nasal from Nares Updated: 09/17/22 2056     Special Requests: NO SPECIAL REQUESTS        Culture result: MRSA NOT PRESENT               Screening of patient nares for MRSA is for surveillance purposes and, if positive, to facilitate isolation considerations in high risk settings. It is not intended for automatic decolonization interventions per se as regimens are not sufficiently effective to warrant routine use.       CULTURE, URINE [[id number]]  (Abnormal)  (Susceptibility) Collected: 09/16/22 1450    Order Status: Completed Specimen: Urine Updated: 09/19/22 1325     Special Requests: --        NO SPECIAL REQUESTS  Reflexed from [id number]       Velarde Count --        >100,000  COLONIES/mL       Culture result: ESCHERICHIA COLI       Susceptibility        Escherichia coli      MALENA      Amikacin ($) Susceptible      Ampicillin ($) Susceptible      Ampicillin/sulbactam ($) Susceptible      Cefazolin ($) Susceptible      Cefepime ($$) Susceptible      Cefoxitin Susceptible      Ceftazidime ($) Susceptible      Ceftriaxone ($) Susceptible      Ciprofloxacin ($) Susceptible Gentamicin ($) Susceptible      Levofloxacin ($) Susceptible      Meropenem ($$) Susceptible      Nitrofurantoin Susceptible      Piperacillin/Tazobac ($) Susceptible      Tobramycin ($) Susceptible      Trimeth/Sulfa Susceptible                                   Discharged to: [facility]. Condition on Discharge:   stable     Discharge instructions:  - Anticoagulate with Coumadin, prior to admission medication  - Take pain medications as prescribed  - Resume pre hospital diet      - Discharge activity: activity as tolerated  - Ambulate with assistive device as needed. - Weight bearing status as tolerated  - Wound Care Keep wound clean and dry. See discharge instruction sheet.  - Webb placed  with recommendation for void trial in 7 days at [facility]. If unsuccessful at [facility], recommending outpatient urology follow up for evaluation and void trial     IV Antibiotic Orders     1. 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 Routine PICC care  3. Antibiotic:  Ceftriaxone 2 grams IV Q 24 hours  4. Lab each Monday:             CBC/diff/platelets             BMP             CRP  5. Lab each Thursday:             CBC/diff/platelets             BMP  6. Fax lab to Dr. Annika Briseno @ [phone].  7.  Call Dr. Annika Briseno @ [phone] for WBC under 4, CPK over 300 or creatinine over 2  8. Duration of therapy: 40 days from              Please call Dr. Annika Briseno @ [phone] before stopping therapy. 9.  Allergies: none to current regimen   10. Call [phone] with name of [address] [place] [address] and to arrange follow up appointment in 14-21 days     Marni Pozo MD                -[facility] 156 LIST     Current Discharge Medication List               START taking these medications     Details          metoprolol tartrate (LOPRESSOR) 50 mg tablet Take 1 Tablet by mouth two (2) times a day for 30 days.   Qty: 60 Tablet, Refills: 0  Start date: 2022, End date: 10/27/2022 tamsulosin (FLOMAX) 0.4 mg capsule Take 1 Capsule by mouth daily for 14 days. Qty: 14 Capsule, Refills: 0  Start date: 9/28/2022, End date: 10/12/2022       oxyCODONE IR (ROXICODONE) 5 mg immediate release tablet Take 0.5-1 Tablets by mouth every six (6) hours as needed for Pain for up to 7 days. Max Daily Amount: 20 mg.  Qty: 28 Tablet, Refills: 0  Start date: 9/27/2022, End date: 10/4/2022     Associated Diagnoses: Infection of total knee replacement, initial encounter ([facility] [address]       senna-docusate (PERICOLACE) 8.6-50 mg per tablet Take 1 Tablet by mouth two (2) times a day for 30 days. Qty: 60 Tablet, Refills: 0  Start date: 9/23/2022, End date: 10/23/2022       polyethylene glycol (MIRALAX) 17 gram packet Take 1 Packet by mouth daily for 14 days. Qty: 14 Packet, Refills: 0  Start date: 9/23/2022, End date: 10/7/2022       cefTRIAXone 2 gram 2 g IV syringe 2 g by IntraVENous route every twenty-four (24) hours for 40 days. Qty: 1 Dose, Refills: 0  Start date: 9/24/2022, End date: 11/3/2022                  CONTINUE these medications which have CHANGED     Details   acetaminophen (TYLENOL) 500 mg tablet Take 1 Tablet by mouth every four (4) hours (while awake). Qty: 100 Tablet, Refills: 0  Start date: 9/23/2022                  CONTINUE these medications which have NOT CHANGED     Details   warfarin (COUMADIN) 5 mg tablet Take 5 mg by mouth every evening. !! furosemide (LASIX) 40 mg tablet Take 40 mg by mouth daily. !! furosemide (LASIX) 20 mg tablet Take  by mouth nightly. vit C/E/Zn/coppr/lutein/zeaxan (PRESERVISION AREDS-2 PO) Take 1 Tablet by mouth two (2) times a day. latanoprost (XALATAN) 0.005 % ophthalmic solution Administer 1 Drop to both eyes nightly. cholecalciferol (VITAMIN D3) (1000 Units /25 mcg) tablet Take 1,000 Units by mouth daily. !! - Potential duplicate medications found. Please discuss with provider.                STOP taking these medications atorvastatin (LIPITOR) 20 mg tablet Comments:   Reason for Stopping:            celecoxib (CELEBREX) 200 mg capsule Comments:   Reason for Stopping:               per medical continuation form        - Follow up in office in 3 weeks with Dr. Emani Johnson  - Follow up with ID (Ariel/Dawna) in 2-3 weeks  - Follow up with Dr Mikala Guerra, cardiologist  - Follow up with PCP after discharge from [facility]  - Voiding trial at [facility] but if unsuccessful, will require outpatient urology follow up (established patient at [facility])        Signed:  Joanna Schlatter, NP  Orthopaedic Nurse Practitioner     9/27/2022  2:36 PM

## 2022-09-28 NOTE — PROGRESS NOTES
ID Progress Note  2022    Subjective:     Continues to do well    Objective:     Antibiotics:  Ceftriaxone   Daptomycin       Vitals: Visit Vitals  /76 (BP 1 Location: Left upper arm)   Pulse 83   Temp 98 °F (36.7 °C)   Resp 18   Ht 5' 8\" (1.727 m)   Wt 85.3 kg (188 lb)   SpO2 95%   BMI 28.59 kg/m²        Tmax:  Temp (24hrs), Av.8 °F (36.6 °C), Min:97.5 °F (36.4 °C), Max:98.2 °F (36.8 °C)      Exam:  Lungs:  clear to auscultation bilaterally  Heart:  regular rate and rhythm  Abdomen:  soft, non-tender. Bowel sounds normal. No masses,  no organomegaly  Wound dressed and dry    Labs:      Recent Labs     22  0859   WBC 7.3   HGB 9.7*      BUN 18   CREA 1.14   AP 76   TBILI 0.4       Cultures:     Lab Results   Component Value Date/Time    Specimen Description: CLEAN CATCH 2011 04:10 PM    Specimen Description: NARES 2011 03:52 PM     Lab Results   Component Value Date/Time    Culture result: No growth (<1,000 CFU/ML) 2022 03:00 PM    Culture result: NO ANAEROBES ISOLATED SO FAR HOLDING FOR 14 DAYS 2022 02:08 PM    Culture result: NO GROWTH ON SOLID MEDIA THUS FAR 2022 02:08 PM    Culture result: (A) 2022 02:08 PM     STAPHYLOCOCCUS SPECIES, COAGULASE NEGATIVE ISOLATED FROM THIO BROTH ONLY DR. Willey Curling REQUESTED WORK UP 22    Culture result:  2022 02:08 PM     (NOTE) [facility] CALLED TO Christen Eason RN ON 22 AT 1431 BY DG    Culture result: No growth thus far, holding 14 days.  2022 02:08 PM       Radiology:     Line/Insert Date:           Assessment:     Chronic right TKR infection--cultures negative, but grew strep in the outpatient setting--GPCcl from operative culture--coag negative staph--have asked the lab to work up  Debility   S/P OR    Objective:     Continue current therapy  Follow up cultures and studies  Plan for long-term antibiotic therapy  Keep in house until final culture results available  Harpreet Diaz Kevne, MD

## 2022-09-28 NOTE — PROGRESS NOTES
Problem: Self Care Deficits Care Plan (Adult)  Goal: *Acute Goals and Plan of Care (Insert Text)  Description: FUNCTIONAL STATUS PRIOR TO ADMISSION: Patient was independent and active without use of DME.    HOME SUPPORT: The patient lived alone with family in area to provide assistance. Occupational Therapy Goals  Initiated 9/23/2022  1. Patient will perform lower body ADLs with contact guard assistance within 7 day(s). 2.  Patient will perform upper body ADLs standing 5 mins without fatigue or LOB with contact guard assistance within 7 day(s). 3.  Patient will perform all aspects of toileting with contact guard assistance within 7 day(s). 4.  Patient will participate in upper extremity therapeutic exercise/activities with contact guard assistance for 10 minutes within 7 day(s). 5.  Patient will utilize energy conservation techniques during functional activities without cues within 7 day(s). Outcome: Progressing Towards Goal    OCCUPATIONAL THERAPY TREATMENT  Patient: Tristan Dela Cruz (81 y.o. male)  Date: 9/28/2022  Diagnosis: Infection of total knee replacement, initial encounter ([facility] [T84.59XA, Z96.659] <principal problem not specified>  Procedure(s) (LRB):  RIGHT KNEE INCISION AND DRAINAGE WITH POLYETHYLENE EXCHANGE (SPINAL/IV SED) (Right) 7 Days Post-Op  Precautions: Fall, WBAT  Chart, occupational therapy assessment, plan of care, and goals were reviewed. ASSESSMENT  Patient continues with skilled OT services and is progressing towards goals. Pt's performance of ADL/IADL tasks continues to be limited at this time by impaired balance (standing), activity tolerance, cognition (situational confusion/poor safety awareness), and generalized weakness. Pt received seated in bedside chair and agreeable to participation in therapy session. Pt continues to require MAX VC and physical assistance for safe use/mgt of RW within environment.  Pt becoming somewhat irritable/agitated when prompted for use of RW and with encouragement for active participation/completion of ADL tasks in bathroom. Pt is able to articulate high level of PLOF, however unable to comprehend impact of current deficits on ADL routines. Continue to recommend IPR at d/c as pt lives alone and was independent at baseline. Current Level of Function Impacting Discharge (ADLs): up to min A functional transfers in prep for ADL tasks, CGA seated toileting hygiene (simulated, pt refused), up to min A standing grooming at sink     Other factors to consider for discharge: PLOF, lives alone, poor safety awareness          PLAN :  Patient continues to benefit from skilled intervention to address the above impairments. Continue treatment per established plan of care to address goals. Recommendation for discharge: (in order for the patient to meet his/her long term goals)  Therapy 3 hours per day 5-7 days per week    This discharge recommendation:  Has been made in collaboration with the attending provider and/or case management    IF patient discharges home will need the following DME: TBD       SUBJECTIVE:   Patient stated I normally do all of this stuff standing at my sink at home so I don't see the point of doing any of it here!     OBJECTIVE DATA SUMMARY:   Cognitive/Behavioral Status:  Neurologic State: Alert;Irritable  Orientation Level: Oriented X4  Cognition: Follows commands;Decreased attention/concentration;Poor safety awareness  Perception: Appears intact  Perseveration: No perseveration noted  Safety/Judgement: Awareness of environment;Decreased awareness of need for assistance;Decreased awareness of need for safety;Decreased insight into deficits    Functional Mobility and Transfers for ADLs:  Bed Mobility:       Transfers:  Sit to Stand: Minimum assistance  Functional Transfers  Bathroom Mobility: Contact guard assistance  Toilet Transfer : Minimum assistance  Cues: Verbal cues provided;Physical assistance       Balance:  Sitting: Intact  Standing: Impaired; With support  Standing - Static: Good;Constant support  Standing - Dynamic : Fair;Constant support    ADL Intervention:       Grooming  Grooming Assistance: Minimum assistance  Position Performed: Standing (at sink)  Washing Hands: Minimum assistance  Cues: Verbal cues provided;Physical assistance                             Toileting  Toileting Assistance: Contact guard assistance (simulated on toilet)  Bladder Hygiene: Contact guard assistance  Bowel Hygiene: Contact guard assistance  Clothing Management: Contact guard assistance  Adaptive Equipment: Grab bars; Walker    Cognitive Retraining  Safety/Judgement: Awareness of environment;Decreased awareness of need for assistance;Decreased awareness of need for safety;Decreased insight into deficits    Home safety: Patient instructed on home modifications and safety (raise height of ADL objects, appropriate height of chair surfaces, recliner safety, change of floor surfaces, clear pathways) to increase independence and fall prevention. Patient indicated fair/guarded understanding. Standing: Patient instructed and demonstrated during ADLs to walk up to sink/counter top/surfaces, step into walker to increase safety of joint and fall prevention with Minimum assistance. Patient instructed to increase amount of time standing, observe standing position during ADLs in order to increase even weight bearing through bilateral LEs in order to increase independence with ADLs. Goal to be reached 30 days post - op, per orthopedic surgeon or per PT. Patient indicated fair/guarded understanding. Pain:  None reported     Activity Tolerance:   Good and Fair      After treatment patient left in no apparent distress:   Sitting in chair, Call bell within reach, and Bed / chair alarm activated    COMMUNICATION/COLLABORATION:   The patients plan of care was discussed with: Registered nurse.      KATERIN Gu, OTR/L  Time Calculation: 16 mins

## 2022-09-28 NOTE — PROGRESS NOTES
Problem: Mobility Impaired (Adult and Pediatric)  Goal: *Acute Goals and Plan of Care (Insert Text)  Description: FUNCTIONAL STATUS PRIOR TO ADMISSION: Patient was independent and active without use of DME.    HOME SUPPORT PRIOR TO ADMISSION: The patient lived alone with daughter to provide intermittent assistance. Patient was independent with ADLs, daughter traveled 2x/week to assist with IADLs and check on him. Physical Therapy Goals  Initiated 9/22/2022    1. Patient will move from supine to sit and sit to supine , scoot up and down, and roll side to side in bed with modified independence within 4 days. 2. Patient will perform sit to stand with modified independence within 4 days. 3. Patient will ambulate with modified independence for 150 feet with the least restrictive device within 4 days. 4. Patient will ascend/descend 4 stairs with cane and one handrail(s) with modified independence within 4 days. 5. Patient will perform home exercise program per protocol with independence within 4 days. 6. Patient will demonstrate AROM 0-90 degrees in operative joint within 4 days. Outcome: Progressing Towards Goal   PHYSICAL THERAPY TREATMENT  Patient: Calvin Cunningham (25 y.o. male)  Date: 9/28/2022  Diagnosis: Infection of total knee replacement, initial encounter ([facility] [T84.59XA, Z96.659] <principal problem not specified>  Procedure(s) (LRB):  RIGHT KNEE INCISION AND DRAINAGE WITH POLYETHYLENE EXCHANGE (SPINAL/IV SED) (Right) 7 Days Post-Op  Precautions: Fall, WBAT  Chart, physical therapy assessment, plan of care and goals were reviewed. ASSESSMENT  Patient continues with skilled PT services and is progressing towards goals. Patient presented in bed. Performed bed mobility, transfers, gait with RW. Performed post-TKA exercises with cueing. ..good contractions.      Current Level of Function Impacting Discharge (mobility/balance): Contact guard, additional time, use of bed rail and HOB elevated for supine-sit. Transfers with contact guard and ambulated 150 ft with RW and gait belt. Cues for safe technique. Other factors to consider for discharge: Lives alone/Limited Safety Awareness         PLAN :  Patient continues to benefit from skilled intervention to address the above impairments. Continue treatment per established plan of care. to address goals. Recommendation for discharge: (in order for the patient to meet his/her long term goals)  Therapy 3 hours per day 5-7 days per week    This discharge recommendation:  Has been made in collaboration with the attending provider and/or case management    IF patient discharges home will need the following DME: patient owns DME required for discharge       SUBJECTIVE:   Patient stated I am not walking enough.     OBJECTIVE DATA SUMMARY:   Critical Behavior:  Neurologic State: Alert, Irritable  Orientation Level: Oriented X4  Cognition: Follows commands, Decreased attention/concentration, Poor safety awareness  Safety/Judgement: Awareness of environment, Decreased awareness of need for assistance, Decreased awareness of need for safety, Decreased insight into deficits  Functional Mobility Training:  Bed Mobility:     Supine to Sit: Contact guard assistance; Additional time;Bed Modified; Adaptive equipment (use of bed rail)  Sit to Supine:  (remained up in recliner)           Transfers:  Sit to Stand: Contact guard assistance (from bed)  Stand to Sit: Contact guard assistance        Bed to Chair: Contact guard assistance                    Balance:  Sitting: Intact  Standing: Impaired; With support  Standing - Static: Good;Constant support  Standing - Dynamic : Fair;Constant support  Ambulation/Gait Training:  Distance (ft): 150 Feet (ft)  Assistive Device: Walker, rolling;Gait belt  Ambulation - Level of Assistance: Contact guard assistance (lots of safety cues)        Gait Abnormalities: Antalgic  Right Side Weight Bearing: As tolerated     Base of Support: Widened;Shift to left  Stance: Right decreased  Speed/Angela: Fluctuations  Step Length: Left shortened  Swing Pattern: Right asymmetrical     Interventions: Safety awareness training;Verbal cues             Therapeutic Exercises: Ankle Pumps  Ham Sets  Quad Sets  Heel Slides  X 10 each, 5-7x daily      Pain Ratin/10    Activity Tolerance:   Good    After treatment patient left in no apparent distress:   Sitting in chair, Call bell within reach, Bed / chair alarm activated, Caregiver / family present, and nurse notified. COMMUNICATION/COLLABORATION:   The patients plan of care was discussed with: Occupational therapist and Registered nurse.      Brad Gardner   Time Calculation: 25 mins

## 2022-09-28 NOTE — PROGRESS NOTES
NUTRITION  Reason for Assessment: Initial/LOS      Recommendations/Interventions/Plan:   Continue regular diet      Will rescreen per policy       Past Medical History:   Diagnosis Date    Arthritis     DJD    Cancer ([facility]     prostate    Cancer ([facility]     SKIN CA RIGHT EYEBROW AREA    Chronic pain     History of colonoscopy with polypectomy 2005~    Hypercholesterolemia     Macular degeneration     Other ill-defined conditions(799.89)     cataracts    PAF (paroxysmal atrial fibrillation) (HCC)     Thromboembolus (HCC)     3 x left leg, 1 x right leg 1990'S       Pt screened for LOS. Chart/labs/meds reviewed. Admitted with Infection of total knee replacement, initial encounter ([facility] [T84.59XA, Z96.659], s/p I&D of R knee arthroplasty. Pt had head CT 9/26 for confusion- no abnormalities. Cardiology following for Afib with RVR, pt on lasix therapy. Visited pt at bedside where he stated his appetite is \"fine\" and he's been eating \"most\" of his meals. Recorded intake of meals consistently >50%. Offered pt ons- he refused. Stated #, current wt 188#. Pt denied recent wt changes, denied n/v/c/d, no issues chewing or swallowing. Pt has been ordering his meals- encouraged him to continue to order and eat well. Nutrition Related Findings:   Edema: No data recorded    Last BM: 09/27/22, Formed    Skin: surgical incision R knee      Current Nutrition Therapies:  Diet: regular  Supplements: none  Meal intake: Patient Vitals for the past 168 hrs:   % Diet Eaten   09/28/22 0842 76 - 100%   09/27/22 1200 51 - 75%   09/27/22 0803 76 - 100%   09/26/22 1400 26 - 50%   09/26/22 0826 76 - 100%   09/24/22 1727 51 - 75%   09/24/22 1315 51 - 75%   09/24/22 1055 26 - 50%   09/22/22 0859 76 - 100%     Supplement intake: No data found.       Weight Hx:  Wt Readings from Last 10 Encounters:   09/21/22 85.3 kg (188 lb)   09/16/22 85.3 kg (188 lb 0.8 oz)   09/08/22 79.4 kg (175 lb)   08/24/22 83.9 kg (185 lb)   08/22/22 83.9 kg (185 lb)   08/11/22 83.9 kg (185 lb)   07/06/22 83.9 kg (185 lb)   04/20/21 88.6 kg (195 lb 5.2 oz)   04/12/21 88.6 kg (195 lb 5.2 oz)   02/17/20 88.7 kg (195 lb 8.8 oz)         Estimated Nutrition Needs:   Energy: 1337  Wt used: Current  Protein: 85  Wt used: Current   Fluid: 1 ml/kcal       Recent Labs     09/27/22  0859   *   BUN 18   CREA 1.14      K 3.9   *   CO2 25   CA 8.7       No results for input(s): GLUCPOC in the last 72 hours.     Lab Results   Component Value Date/Time    Hemoglobin A1c 5.9 (H) 09/16/2022 02:50 PM    Hemoglobin A1c 5.4 04/12/2021 11:27 AM         Zack Sandoval RD  Available via [employer]

## 2022-09-28 NOTE — PROGRESS NOTES
Pharmacist Note - Warfarin Dosing  Consult provided for this 89 y.o.male to manage warfarin for h/o DVT/afib, now s/p R knee I&D w/poly exchange    INR Goal: 2 - 3  Risk factors: Age > 65  Daily INR ordered: YES    Recent Labs     09/28/22  0124 09/27/22  0859 09/27/22  0433 09/26/22  0320   HGB  --  9.7*  --   --    INR 2.4*  --  2.0* 1.6*     Date               INR                  Dose  9/21   1.8, 1.2 5 mg  9/22   1.3        5 mg  9/23   1.3        5 mg  9/24                1.3                   7 mg   9/25                1.3                   8 mg           9/26                1.6                   6 mg  9/27                2.0                   5 mg   9/28                2.4                   5 mg                                                                      Assessment/ Plan: Will order warfarin 5 mg PO x 1 dose considering overall trend in INR, h/o DVTs/AFib, home regimen    Pharmacy will continue to monitor daily and adjust therapy as indicated. Please contact the pharmacist at  for outpatient recommendations if needed.       Jose Griffiths, PharmD  Clinical Pharmacist, Orthopedics and Med/Surg  [address] [address] () no cough

## 2022-09-28 NOTE — PROGRESS NOTES
Bedside shift change report given to Pardeep Tyson RN (oncoming nurse) by Mitesh Almaguer RN (offgoing nurse). Report included the following information SBAR, Kardex, MAR, and Recent Results.

## 2022-09-28 NOTE — PROGRESS NOTES
Bedside shift change report given to [facility] [employer] (oncoming nurse) by Antonette Holter  (offgoing nurse). Report included the following information SBAR, Kardex, Intake/Output, and MAR.

## 2022-09-29 LAB
INR PPP: 3 (ref 0.9–1.1)
PROTHROMBIN TIME: 29.2 SEC (ref 9–11.1)

## 2022-09-29 PROCEDURE — 74011250637 HC RX REV CODE- 250/637: Performed by: STUDENT IN AN ORGANIZED HEALTH CARE EDUCATION/TRAINING PROGRAM

## 2022-09-29 PROCEDURE — 97116 GAIT TRAINING THERAPY: CPT

## 2022-09-29 PROCEDURE — 74011250637 HC RX REV CODE- 250/637: Performed by: PHYSICIAN ASSISTANT

## 2022-09-29 PROCEDURE — 65270000046 HC RM TELEMETRY

## 2022-09-29 PROCEDURE — 74011000258 HC RX REV CODE- 258: Performed by: INTERNAL MEDICINE

## 2022-09-29 PROCEDURE — 97164 PT RE-EVAL EST PLAN CARE: CPT

## 2022-09-29 PROCEDURE — 74011000250 HC RX REV CODE- 250: Performed by: PHYSICIAN ASSISTANT

## 2022-09-29 PROCEDURE — 74011250636 HC RX REV CODE- 250/636: Performed by: PHYSICIAN ASSISTANT

## 2022-09-29 PROCEDURE — 97110 THERAPEUTIC EXERCISES: CPT | Performed by: OCCUPATIONAL THERAPIST

## 2022-09-29 PROCEDURE — 74011250637 HC RX REV CODE- 250/637

## 2022-09-29 PROCEDURE — 85610 PROTHROMBIN TIME: CPT

## 2022-09-29 PROCEDURE — 74011250637 HC RX REV CODE- 250/637: Performed by: FAMILY MEDICINE

## 2022-09-29 PROCEDURE — 36415 COLL VENOUS BLD VENIPUNCTURE: CPT

## 2022-09-29 PROCEDURE — 97535 SELF CARE MNGMENT TRAINING: CPT | Performed by: OCCUPATIONAL THERAPIST

## 2022-09-29 PROCEDURE — 74011250636 HC RX REV CODE- 250/636: Performed by: INTERNAL MEDICINE

## 2022-09-29 PROCEDURE — 97110 THERAPEUTIC EXERCISES: CPT

## 2022-09-29 RX ORDER — WARFARIN 1 MG/1
2.5 TABLET ORAL ONCE
Status: COMPLETED | OUTPATIENT
Start: 2022-09-29 | End: 2022-09-29

## 2022-09-29 RX ADMIN — FUROSEMIDE 20 MG: 20 TABLET ORAL at 10:32

## 2022-09-29 RX ADMIN — SODIUM CHLORIDE, PRESERVATIVE FREE 10 ML: 5 INJECTION INTRAVENOUS at 05:18

## 2022-09-29 RX ADMIN — METOPROLOL TARTRATE 50 MG: 50 TABLET ORAL at 10:32

## 2022-09-29 RX ADMIN — ACETAMINOPHEN 500 MG: 325 TABLET, FILM COATED ORAL at 05:18

## 2022-09-29 RX ADMIN — DAPTOMYCIN 500 MG: 500 INJECTION, POWDER, LYOPHILIZED, FOR SOLUTION INTRAVENOUS at 18:00

## 2022-09-29 RX ADMIN — SENNOSIDES AND DOCUSATE SODIUM 1 TABLET: 50; 8.6 TABLET ORAL at 18:06

## 2022-09-29 RX ADMIN — CEFTRIAXONE SODIUM 2 G: 2 INJECTION, POWDER, FOR SOLUTION INTRAMUSCULAR; INTRAVENOUS at 23:20

## 2022-09-29 RX ADMIN — WARFARIN SODIUM 2.5 MG: 1 TABLET ORAL at 13:06

## 2022-09-29 RX ADMIN — ACETAMINOPHEN 500 MG: 325 TABLET, FILM COATED ORAL at 18:05

## 2022-09-29 RX ADMIN — METOPROLOL TARTRATE 50 MG: 50 TABLET ORAL at 18:06

## 2022-09-29 RX ADMIN — TAMSULOSIN HYDROCHLORIDE 0.4 MG: 0.4 CAPSULE ORAL at 10:32

## 2022-09-29 RX ADMIN — ACETAMINOPHEN 500 MG: 325 TABLET, FILM COATED ORAL at 23:21

## 2022-09-29 RX ADMIN — FUROSEMIDE 10 MG: 20 TABLET ORAL at 23:21

## 2022-09-29 RX ADMIN — ACETAMINOPHEN 500 MG: 325 TABLET, FILM COATED ORAL at 13:06

## 2022-09-29 RX ADMIN — ACETAMINOPHEN 500 MG: 325 TABLET, FILM COATED ORAL at 10:32

## 2022-09-29 RX ADMIN — SODIUM CHLORIDE, PRESERVATIVE FREE 10 ML: 5 INJECTION INTRAVENOUS at 23:22

## 2022-09-29 RX ADMIN — SODIUM CHLORIDE, PRESERVATIVE FREE 10 ML: 5 INJECTION INTRAVENOUS at 14:00

## 2022-09-29 NOTE — PROGRESS NOTES
Problem: Falls - Risk of  Goal: *Absence of Falls  Description: Document [place] Fall Risk and appropriate interventions in the flowsheet. Outcome: Progressing Towards Goal  Note: Fall Risk Interventions:  Mobility Interventions: Bed/chair exit alarm    Mentation Interventions: Bed/chair exit alarm    Medication Interventions: Bed/chair exit alarm    Elimination Interventions: Bed/chair exit alarm    History of Falls Interventions:  Investigate reason for fall, Room close to nurse's station, Bed/chair exit alarm

## 2022-09-29 NOTE — PROGRESS NOTES
Problem: Self Care Deficits Care Plan (Adult)  Goal: *Acute Goals and Plan of Care (Insert Text)  Description: FUNCTIONAL STATUS PRIOR TO ADMISSION: Patient was independent and active without use of DME.    HOME SUPPORT: The patient lived alone with family in area to provide assistance. Occupational Therapy Goals  Initiated 9/23/2022  1. Patient will perform lower body ADLs with contact guard assistance within 7 day(s). 2.  Patient will perform upper body ADLs standing 5 mins without fatigue or LOB with contact guard assistance within 7 day(s). 3.  Patient will perform all aspects of toileting with contact guard assistance within 7 day(s). 4.  Patient will participate in upper extremity therapeutic exercise/activities with contact guard assistance for 10 minutes within 7 day(s). 5.  Patient will utilize energy conservation techniques during functional activities without cues within 7 day(s). Outcome: Progressing Towards Goal     OCCUPATIONAL THERAPY TREATMENT  Patient: Gladys Montana (70 y.o. male)  Date: 9/29/2022  Diagnosis: In[facility] total [facility] [T84.59XA, Z96.659] <principal problem not specified>  Procedure(s) (LRB):  RIGHT KNEE INCISION AND DRAINAGE WITH POLYETHYLENE EXCHANGE (SPINAL/IV SED) (Right) 8 Days Post-Op  Precautions: Fall, WBAT  Chart, occupational therapy assessment, plan of care, and goals were reviewed. ASSESSMENT  Patient continues with skilled OT services and is progressing towards goals. Pt demonstrated improved mobility and ADL participation this session and was able to bend forward and reach feet to manage slipper socks. Pt remains limited by decreased strength, endurance, mobility, balance and safety. He his hard working and very motivated for increased independence. Continue to recommend IP rehab at discharge.      Current Level of Function Impacting Discharge (ADLs): LE ADLs min A, toileting max A, functional mobility min A    Other factors to consider for discharge: see above         PLAN :  Patient continues to benefit from skilled intervention to address the above impairments. Continue treatment per established plan of care to address goals. Recommend with staff: up to chair for all meals and bathroom for toileting as able    Recommend next OT session: standing balance and endurance, LE ADLs    Recommendation for discharge: (in order for the patient to meet his/her long term goals)  Therapy 3 hours per day 5-7 days per week    This discharge recommendation:  Has been made in collaboration with the attending provider and/or case management    IF patient discharges home will need the following DME: TBD       SUBJECTIVE:   Patient stated I need to rest now.     OBJECTIVE DATA SUMMARY:   Cognitive/Behavioral Status:  Neurologic State: Alert; Appropriate for age  Orientation Level: Oriented X4  Cognition: Appropriate decision making; Appropriate for age attention/concentration; Follows commands  Perception: Appears intact  Perseveration: No perseveration noted  Safety/Judgement: Awareness of environment; Fall prevention    Functional Mobility and Transfers for ADLs:  Bed Mobility:  Supine to Sit: Contact guard assistance  Sit to Supine: Contact guard assistance  Scooting: Contact guard assistance    Transfers:  Sit to Stand: Contact guard assistance  Functional Transfers  Bathroom Mobility: Contact guard assistance  Cues: Tactile cues provided;Verbal cues provided;Visual cues provided  Adaptive Equipment: Walker (comment)  Bed to Chair: Contact guard assistance    Balance:  Sitting: Intact  Standing: Impaired; With support  Standing - Static: Good  Standing - Dynamic : Fair    ADL Intervention:    Type of Bath: Chlorhexidine (CHG)    Lower Body Dressing Assistance  Socks: Set-up; Supervision  Leg Crossed Method Used: Yes (LLE only)  Position Performed: Bending forward method;Seated edge of bed  Cues: Doff;Don;Verbal cues provided;Visual cues provided         Cognitive Retraining  Safety/Judgement: Awareness of environment; Fall prevention    Bathing: Patient instructed when bathing to not submerge wound in water, stand to shower or sponge bathe, cover wound with plastic and tape to ensure no water reaches bandage/wound without cues. Patient indicated understanding. Dressing joint: Patient instructed and demonstrated to don/doff Right LE first/last minimal cues. Patient instructed and demonstrated to don all clothing while sitting prior to standing, doff all clothing to knees while standing, then sit to doff clothing off from knees to feet in order to facilitate fall prevention, pain management, and energy conservation with Minimum assistance. Dressing joint reach exercise: To increase independence with lower body dressing, patient instructed and demonstrated to reach down Right LE in a seated position slowly to prevent tearing/shearing until slight pull is felt, hold at end range for 10 seconds, then return to starting upright position with Minimum assistance. Patient instructed to complete three sets of three repetitions each daily. Home safety: Patient instructed on home modifications and safety (raise height of ADL objects, appropriate height of chair surfaces, recliner safety, change of floor surfaces, clear pathways) to increase independence and fall prevention. Patient indicated understanding. Standing: Patient instructed and demonstrated during ADLs to walk up to sink/counter top/surfaces, step into walker to increase safety of joint and fall prevention with Minimum assistance. Patient educated about knee anatomy verbally and with pictures and educated to avoid rotation of Right LE. Instructed to apply concept to ADLs within the home (no twisting of knee during reaching across body, square off while using objects, slide objects along surfaces).   Patient instructed to increase amount of time standing, observe standing position during ADLs in order to increase even weight bearing through bilateral LEs in order to increase independence with ADLs. Goal to be reached 30 days post - op, per orthopedic surgeon or per PT. Patient indicated understanding. Tub transfer: Patient instructed regarding when it is safe to begin transfer into tub (complete stairs with PT, advance exercises with PT high enough to clear tub height). Patient instructed to use the same technique as used with stairs when entering and exiting tub (\"up with the non-surgical, down with the surgical leg\"). Patient indicated understanding. Pain:  4/10    Activity Tolerance:   Fair and requires rest breaks      After treatment patient left in no apparent distress:   Sitting in chair, Call bell within reach, and Bed / chair alarm activated    COMMUNICATION/COLLABORATION:   The patients plan of care was discussed with: Physical therapist and Registered nurse.      Ena Perez OT  Time Calculation: 29 mins

## 2022-09-29 NOTE — PROGRESS NOTES
Ortho NP Note    POD# 8  s/p RIGHT KNEE INCISION AND DRAINAGE WITH POLYETHYLENE EXCHANGE (SPINAL/IV SED)   Pt seen in room    Pt resting in bed. States: \"I feel normal today. \"  Pain well controlled, patient inquiring about transfer to [facility]. States he has been there before and is excited to return. Denies CP, SOB. No palpitations. VSS, afebrile. Monitor with HR of 90s. Visit Vitals  /87 (BP 1 Location: Right lower arm, BP Patient Position: At rest)   Pulse 94   Temp 97.4 °F (36.3 °C)   Resp 18   Ht 5' 8\" (1.727 m)   Wt 85.3 kg (188 lb)   SpO2 96%   BMI 28.59 kg/m²       Voiding status: ag in place  Output (mL)  Urine Voided: 225 ml (09/21/22 1600)  Last Bowel Movement Date: 09/28/22 (09/28/22 1700)  Unmeasurable Output  Stool Occurrence(s): 1 (09/28/22 0800)  Straight Cath  Straight Cath: Nurse performed cath (09/24/22 0314)  Number of Attempts: 1 (09/24/22 0314)  Catheter Size: 12 FR (09/24/22 0314)  Time Catheter Inserted: 9984 (09/24/22 0314)  Time Catheter Removed: 0310 (09/24/22 0314)  Urine: 650 mL (09/24/22 0314)      Labs    Lab Results   Component Value Date/Time    HGB 9.7 (L) 09/27/2022 08:59 AM      Lab Results   Component Value Date/Time    INR 3.0 (H) 09/29/2022 05:22 AM      Lab Results   Component Value Date/Time    Sodium 139 09/27/2022 08:59 AM    Potassium 3.9 09/27/2022 08:59 AM    Chloride 109 (H) 09/27/2022 08:59 AM    CO2 25 09/27/2022 08:59 AM    Glucose 122 (H) 09/27/2022 08:59 AM    BUN 18 09/27/2022 08:59 AM    Creatinine 1.14 09/27/2022 08:59 AM    Calcium 8.7 09/27/2022 08:59 AM     Recent Glucose Results: No results found for: GLU, GLUPOC, GLUCPOC        Body mass index is 28.59 kg/m². : A BMI > 30 is classified as obesity and > 40 is classified as morbid obesity. Aquacel dressing removed yesterday, incision c.d.i  Cryotherapy in place over incision  Calves soft and supple; No pain with passive stretch  Bilateral LEs warm, dry. 2+ DP pulses.     Sensation and motor intact - PF/DF/EHL intact 5/5  Foot Pumps for mechanical DVT proph while in bed     PLAN:  1) PT: BID WBAT. Therapy ongoing at [facility] after discharge  2) Anticoagulation:  Coumadin for DVT Prophylaxis,  pharmacy to dose. INR today: 3.0. Encouraged early mobilization, bed exercises, and SCD use. 3) Pain - Multimodal approach including cryotherapy, scheduled Tylenol with  PRN  tramadol, oxycodone. 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 Intra operative cultures: staph epi from thio broth, susceptibilities back today. Pre op culture: strep. Currently receiving ceftriaxone, daptomycin via right arm PICC line. With plan for discharge with long term IV ABX x 40 days. 5) POUR: Ag to remain in place at discharge with plan for voiding trial at [facility]; continue flomax  6) Atrial fibrillation: Appreciate cardiology and hospitalist input--both cleared for discharge on 9/27. Continue lopressor 50 mg PO BID, lasix. Recommending PCP and cardiology follow up. 7) Post operative anemia:  Hgb now stable in post operative period, Hgb on POD 6: 9.7. Expected Acute blood loss post-op anemia, continue to monitor. 8) Post op care: Continue OBR, encouraged IS. Follow up in 3 weeks with Dr Adriel Montero. 9) Readiness for discharge:     [x] Vital Signs stable    [] + Voiding d/c with ag   [x] Wound intact, drainage minimal    [x] Tolerating PO intake     [x] Cleared by PT (OT if applicable) : to [facility]    [] Stair training completed (if applicable)    [] Independent / Contact Guard Assist (household distance)     [] Bed mobility     [] Car transfers     [] ADLs    [x] Adequate pain control on oral medication alone     Ready for discharge, cleared by ID medicine and cardiology. Pending bed availability at [facility]. Printed Rx on chart, med rec done.       Rose Minor NP  Available via [employer]

## 2022-09-29 NOTE — PROGRESS NOTES
Problem: Mobility Impaired (Adult and Pediatric)  Goal: *Acute Goals and Plan of Care (Insert Text)  Description: FUNCTIONAL STATUS PRIOR TO ADMISSION: Patient was independent and active without use of DME.    HOME SUPPORT PRIOR TO ADMISSION: The patient lived alone with daughter to provide intermittent assistance. Patient was independent with ADLs, daughter traveled 2x/week to assist with IADLs and check on him. Physical Therapy Goals  Reassessed  9/29/2022  1. Patient will move from supine to sit and sit to supine , scoot up and down, and roll side to side in bed with modified independence within 4 days. (Contact Guard Assistance)  2. Patient will perform sit to stand with modified independence within 4 days. (Contact Guard Assistance)  3. Patient will ambulate with modified independence for 150 feet with the least restrictive device within 4 days. (Contact Guard Assistance). 4. Patient will ascend/descend 4 stairs with cane and one handrail(s) with modified independence within 4 days. (GOAL Discontinued as patient is going to Rehab rather than home)  5. Patient will perform home exercise program per protocol with independence within 4 days. (GOAL MET)  6. Patient will demonstrate AROM 0-90 degrees in operative joint within 4 days. (GOAL MET)    Patient has met 2 out of 6 goals and has improved toward 3 out of 6 goals. Stair Goal discontinued as patient is going to Rehab rather than home. Physical Therapy Goals  Initiated 9/22/2022    1. Patient will move from supine to sit and sit to supine , scoot up and down, and roll side to side in bed with modified independence within 4 days. 2. Patient will perform sit to stand with modified independence within 4 days. 3. Patient will ambulate with modified independence for 150 feet with the least restrictive device within 4 days. 4. Patient will ascend/descend 4 stairs with cane and one handrail(s) with modified independence within 4 days.   5. Patient will perform home exercise program per protocol with independence within 4 days. 6. Patient will demonstrate AROM 0-90 degrees in operative joint within 4 days. Outcome: Progressing Towards Goal   PHYSICAL THERAPY REEVALUATION  Patient: Berenice Louis (89 y.o. male)  Date: 9/29/2022  Primary Diagnosis: Infection of total knee replacement, [facility] [T84.59XA, Z96.659]  Procedure(s) (LRB):  RIGHT KNEE INCISION AND DRAINAGE WITH POLYETHYLENE EXCHANGE (SPINAL/IV SED) (Right) 8 Days Post-Op   Precautions:   Fall, WBAT      ASSESSMENT  Based on the objective data described below, the patient presents as follows at reassessment: Patient has met 2 out of 6 goals and has improved toward 3 out of 6 goals. Stair Goal discontinued as patient is going to Rehab rather than home. Current Level of Function Impacting Discharge (mobility/balance): Contact guard and safety cues for all mobility. Ambulated 150 ft with RW and gait belt. Functional Outcome Measure: The patient scored 55/100 on the Barthel outcome measure which is indicative of moderate amimpaired ability to care for basic self-needs/dependency on others. .      Other factors to consider for discharge: Lives alone/Far from [address]     Patient will benefit from skilled therapy intervention to address the above noted impairments. PLAN :  Recommendations and Planned Interventions: bed mobility training, transfer training, gait training, therapeutic exercises, patient and family training/education, and therapeutic activities      Frequency/Duration: Patient will be followed by physical therapy:  5 times a week to address goals.     Recommendation for discharge: (in order for the patient to meet his/her long term goals)  Therapy 3 hours per day 5-7 days per week    This discharge recommendation:  Has been made in collaboration with the attending provider and/or case management    Equipment recommendations for successful discharge (if) home: patient owns DME required for discharge         SUBJECTIVE:   Patient stated I am ready to do some walking.     OBJECTIVE DATA SUMMARY:   HISTORY:    Past Medical History:   Diagnosis Date    Arthritis     DJD    Cancer ([facility]     prostate    Cancer ([facility]     SKIN CA RIGHT EYEBROW AREA    Chronic pain     History of colonoscopy with polypectomy 2005~    Hypercholesterolemia     Macular degeneration     Other ill-defined conditions(799.89)     cataracts    PAF (paroxysmal atrial fibrillation) (HCC)     Thromboembolus (HCC)     3 x left leg, 1 x right leg 1990'S     Past Surgical History:   Procedure Laterality Date    HX CATARACT REMOVAL Bilateral     HX KNEE REPLACEMENT Left     HX MOHS PROCEDURES  1995    right    HX OTHER SURGICAL  1996    Tony filter insertion    HX PROSTATECTOMY  age 72    HX ROTATOR CUFF REPAIR Right     HX TONSILLECTOMY      GA LIGMT REVISION,KNEE,EXTRA-ARTIC Right 04/2021    GA TOTAL HIP ARTHROPLASTY      left    GA TOTAL KNEE ARTHROPLASTY      right    VASCULAR SURGERY PROCEDURE UNLIST      [facility] course since last seen and reason for reevaluation: Based on the objective data described below, the patient presents as follows at reassessment: Patient has met 2 out of 6 goals and has improved toward 3 out of 6 goals. Stair Goal discontinued as patient is going to Rehab rather than home.       Personal factors and/or comorbidities impacting plan of care: Lives alone/Far from [facility]: Private residence  # Steps to Enter: 3  Rails to Enter: Yes  Hand Rails : Right  Wheelchair Ramp: No  One/Two Story Residence: One story  Living Alone: Yes  Support Systems: Child(kita) (will be staying with daughter)  Patient Expects to be Discharged to[de-identified] Rehab hospital/unit acute  Current DME Used/Available at Home: Walker, rolling, Cane, straight, Grab bars  Tub or Shower Type: Shower    EXAMINATION/PRESENTATION/DECISION MAKING: Critical Behavior:  Neurologic State: Alert  Orientation Level: Oriented X4  Cognition: Follows commands  Safety/Judgement: Awareness of environment, Decreased awareness of need for assistance, Decreased awareness of need for safety, Decreased insight into deficits  Hearing: Auditory  Auditory Impairment: Hard of hearing, bilateral  Hearing Aids/Status: With patient  Skin:  Incision wll healed with no drainage  Edema: None  Range Of Motion:  AROM: Generally decreased, functional           PROM: Generally decreased, functional           Strength:    Strength: Generally decreased, functional                    Tone & Sensation:   Tone: Normal              Sensation: Intact               Coordination:  Coordination: Within functional limits  Vision:      Functional Mobility:  Bed Mobility:     Supine to Sit: Contact guard assistance  Sit to Supine: Contact guard assistance  Scooting: Contact guard assistance  Transfers:  Sit to Stand: Contact guard assistance  Stand to Sit: Contact guard assistance        Bed to Chair: Contact guard assistance              Balance:   Sitting: Intact  Standing: Impaired; With support  Standing - Static: Good;Constant support  Standing - Dynamic : Good;Constant support  Ambulation/Gait Training:  Distance (ft): 150 Feet (ft)  Assistive Device: Walker, rolling;Gait belt  Ambulation - Level of Assistance: Contact guard assistance        Gait Abnormalities: Antalgic  Right Side Weight Bearing: As tolerated     Base of Support: Widened;Shift to left  Stance: Right decreased  Speed/Angela: Slow  Step Length: Left shortened  Swing Pattern: Right asymmetrical     Interventions: Safety awareness training;Verbal cues               Therapeutic Exercises:   Patient  is independent with post-op TKA exercise protocol and has same in written, illlustrated form.       Functional Measure:  Barthel Index:    Bathin  Bladder: 0 (indwelling Webb)  Bowels: 10  Groomin  Dressin  Feeding: 10  Mobility: 10  Stairs: 0  Toilet Use: 5  Transfer (Bed to Chair and Back): 10  Total: 55/100       The Barthel ADL Index: Guidelines  1. The index should be used as a record of what a patient does, not as a record of what a patient could do. 2. The main aim is to establish degree of independence from any help, physical or verbal, however minor and for whatever reason. 3. The need for supervision renders the patient not independent. 4. A patient's performance should be established using the best available evidence. Asking the patient, friends/relatives and nurses are the usual sources, but direct observation and common sense are also important. However direct testing is not needed. 5. Usually the patient's performance over the preceding 24-48 hours is important, but occasionally longer periods will be relevant. 6. Middle categories imply that the patient supplies over 50 per cent of the effort. 7. Use of aids to be independent is allowed. Score Interpretation (from [address] [facility])    Independent   60-79 Minimally independent   40-59 Partially dependent   20-39 Very dependent   <20 Totally dependent     -Malinda Napier., Barthel, D.W. (1965). Functional evaluation: the Barthel Index. [facility] ([address] (1997). The Barthel activities of daily living index: self-reporting versus actual performance in the old (> or = 75 years). Journal of [address] [place] [address] 45(7), [address] [facility], J.FernieALFONZO, Debi Flores., Janet Muñoz. (1999). Measuring the change in disability after inpatient rehabilitation; comparison of the responsiveness of the Barthel Index and Functional Hunters Measure. Journal of Neurology, Neurosurgery, and Psychiatry, 66(4), 793-352. Ghulam Syed, NVON.A, HARMONY Ramírez.YESSENIA, & Ta Armstrong M.A. (2004) Assessment of post-stroke quality of life in cost-effectiveness studies: The usefulness of the Barthel Index and the EuroQoL-5D. Quality of Life Research, 13, 210-14             Pain Ratin/10    Activity Tolerance:   Good    After treatment patient left in no apparent distress:   Sitting in chair, Call bell within reach, Bed / chair alarm activated, and OT with patient and nurse notified. COMMUNICATION/EDUCATION:   The patients plan of care was discussed with: Occupational therapist, Registered nurse, and Case management. Fall prevention education was provided and the patient/caregiver indicated understanding., Patient/family have participated as able in goal setting and plan of care. , and Patient/family agree to work toward stated goals and plan of care.     Thank you for this referral.  Tyrese Guerrero   Time Calculation: 30 mins

## 2022-09-29 NOTE — PROGRESS NOTES
Pharmacist Note - Warfarin Dosing  Consult provided for this 89 y.o.male to manage warfarin for h/o DVT/afib, now s/p R knee I&D w/poly exchange    INR Goal: 2 - 3  Risk factors: Age > 65  Daily INR ordered: YES    Recent Labs     09/29/22  0522 09/28/22  0124 09/27/22  0859 09/27/22  0433   HGB  --   --  9.7*  --    INR 3.0* 2.4*  --  2.0*     Date               INR                  Dose  9/21   1.8, 1.2 5 mg  9/22   1.3        5 mg  9/23   1.3        5 mg  9/24                1.3                   7 mg   9/25                1.3                   8 mg           9/26                1.6                   6 mg  9/27                2.0                   5 mg   9/28                2.4                   5 mg            9/29                3.0                   2.5 mg                                                            Assessment/ Plan: Will order warfarin 2.5 mg PO x 1 dose    Pharmacy will continue to monitor daily and adjust therapy as indicated. Please contact the pharmacist at  for outpatient recommendations if needed.

## 2022-09-29 NOTE — PROGRESS NOTES
IV Antibiotic Orders     1. 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 Routine PICC care  3. Antibiotic:  Ceftriaxone 2 grams IV Q 24 hours  4. Lab each Monday:             CBC/diff/platelets             BMP             CRP  5. Lab each Thursday:             CBC/diff/platelets             BMP  6. Fax lab to Dr. Karma Marrufo @ [phone].  7.  Call Dr. Karma Marrufo @ [phone] for WBC under 4, CPK over 300 or creatinine over 2  8. Duration of therapy: 40 days from 9/23             Please call Dr. Karma Marrufo @ [phone] before stopping therapy. 9.  Allergies: none to current regimen   10.  Call [phone] with name of [address] [place] [address] and to arrange follow up appointment in 14-21 days     Rosibel Robles MD

## 2022-09-29 NOTE — PROGRESS NOTES
ID Progress Note  2022    Subjective:     Continues to do well    Objective:     Antibiotics:  Ceftriaxone   Daptomycin       Vitals: Visit Vitals  /83 (BP 1 Location: Right lower arm, BP Patient Position: At rest)   Pulse 94   Temp 97.5 °F (36.4 °C)   Resp 18   Ht 5' 8\" (1.727 m)   Wt 85.3 kg (188 lb)   SpO2 93%   BMI 28.59 kg/m²        Tmax:  Temp (24hrs), Av.6 °F (36.4 °C), Min:97.4 °F (36.3 °C), Max:98 °F (36.7 °C)      Exam:  Lungs:  clear to auscultation bilaterally  Heart:  regular rate and rhythm  Abdomen:  soft, non-tender. Bowel sounds normal. No masses,  no organomegaly  Wound dressed and dry    Labs:      Recent Labs     22  0859   WBC 7.3   HGB 9.7*      BUN 18   CREA 1.14   AP 76   TBILI 0.4       Cultures:     Lab Results   Component Value Date/Time    Specimen Description: CLEAN CATCH 2011 04:10 PM    Specimen Description: NARES 2011 03:52 PM     Lab Results   Component Value Date/Time    Culture result: No growth (<1,000 CFU/ML) 2022 03:00 PM    Culture result: NO ANAEROBES ISOLATED SO FAR HOLDING FOR 14 DAYS 2022 02:08 PM    Culture result: (A) 2022 02:08 PM     STAPHYLOCOCCUS EPIDERMIDIS ISOLATED FROM THIO BROTH ONLY    Culture result: NO GROWTH ON SOLID MEDIA THUS FAR 2022 02:08 PM    Culture result:  2022 02:08 PM     (NOTE) [facility] CALLED TO Jt Ga RN ON 22 AT 1431 BY DG    Culture result: No growth thus far, holding 14 days.  2022 02:08 PM       Radiology:     Line/Insert Date:           Assessment:     Chronic right TKR infection--cultures negative, but grew strep in the outpatient setting--GPCcl from operative culture--coag negative staph--have asked the lab to work up  Debility   S/P OR    Objective:     Continue current therapy  Follow up cultures and studies  Plan for long-term antibiotic therapy  Keep in house until final culture results available--organism is MSSE--also growing C acnes  Will be able to go home on ceftriaxone  Discussed     Elie Keenan MD

## 2022-09-29 NOTE — PROGRESS NOTES
A Spiritual Care Partner Volunteer visited patient in [address] [place] [address] on 9/28/2022.   Documented by:  Chaplain Welsh MDiv, MS, [facility]

## 2022-09-29 NOTE — PROGRESS NOTES
RUR 13 %     Transition of Care- [facility] with IV Dapto- Bed available likely tomorrow 9/30. The patient has a PICC line that was placed on 9/23. BLS transport at discharge. The patient and Dr. Luli Ashraf have been updated. IMM letter received 9/28    The daughter Jorge Nowak #[phone] needs to be updated with discharge plans.- updated 9/29. CM spoke with [facility] with [facility] [phone] and the facility will likely have a bed available tomorrow 9/30.     MARCELLO MenaW

## 2022-09-30 VITALS
OXYGEN SATURATION: 99 % | BODY MASS INDEX: 28.49 KG/M2 | SYSTOLIC BLOOD PRESSURE: 126 MMHG | RESPIRATION RATE: 18 BRPM | DIASTOLIC BLOOD PRESSURE: 96 MMHG | HEIGHT: 68 IN | HEART RATE: 99 BPM | TEMPERATURE: 97.7 F | WEIGHT: 188 LBS

## 2022-09-30 LAB
INR PPP: 2.4 (ref 0.9–1.1)
PROTHROMBIN TIME: 24 SEC (ref 9–11.1)

## 2022-09-30 PROCEDURE — 74011250636 HC RX REV CODE- 250/636: Performed by: FAMILY MEDICINE

## 2022-09-30 PROCEDURE — 74011000250 HC RX REV CODE- 250: Performed by: FAMILY MEDICINE

## 2022-09-30 PROCEDURE — 74011250637 HC RX REV CODE- 250/637: Performed by: FAMILY MEDICINE

## 2022-09-30 PROCEDURE — 74011250637 HC RX REV CODE- 250/637: Performed by: PHYSICIAN ASSISTANT

## 2022-09-30 PROCEDURE — 74011250637 HC RX REV CODE- 250/637: Performed by: STUDENT IN AN ORGANIZED HEALTH CARE EDUCATION/TRAINING PROGRAM

## 2022-09-30 PROCEDURE — 36415 COLL VENOUS BLD VENIPUNCTURE: CPT

## 2022-09-30 PROCEDURE — 74011000250 HC RX REV CODE- 250: Performed by: PHYSICIAN ASSISTANT

## 2022-09-30 PROCEDURE — 85610 PROTHROMBIN TIME: CPT

## 2022-09-30 PROCEDURE — 74011250637 HC RX REV CODE- 250/637

## 2022-09-30 RX ORDER — WARFARIN SODIUM 5 MG/1
5 TABLET ORAL ONCE
Status: COMPLETED | OUTPATIENT
Start: 2022-09-30 | End: 2022-09-30

## 2022-09-30 RX ADMIN — TAMSULOSIN HYDROCHLORIDE 0.4 MG: 0.4 CAPSULE ORAL at 08:08

## 2022-09-30 RX ADMIN — METOPROLOL TARTRATE 50 MG: 50 TABLET ORAL at 08:08

## 2022-09-30 RX ADMIN — ACETAMINOPHEN 500 MG: 325 TABLET, FILM COATED ORAL at 10:27

## 2022-09-30 RX ADMIN — ALTEPLASE 1 MG: 2.2 INJECTION, POWDER, LYOPHILIZED, FOR SOLUTION INTRAVENOUS at 05:48

## 2022-09-30 RX ADMIN — ACETAMINOPHEN 500 MG: 325 TABLET, FILM COATED ORAL at 05:48

## 2022-09-30 RX ADMIN — SODIUM CHLORIDE, PRESERVATIVE FREE 10 ML: 5 INJECTION INTRAVENOUS at 05:49

## 2022-09-30 RX ADMIN — ACETAMINOPHEN 500 MG: 325 TABLET, FILM COATED ORAL at 14:45

## 2022-09-30 RX ADMIN — FUROSEMIDE 20 MG: 20 TABLET ORAL at 08:08

## 2022-09-30 RX ADMIN — WARFARIN SODIUM 5 MG: 5 TABLET ORAL at 15:55

## 2022-09-30 NOTE — PROGRESS NOTES
Changed PICC line dressing. Per charting, red port does not give blood return. Assessed and flushed both ports on PICC line. Good blood return noted for both purple and red port at this time. If port does not give blood return again and unable to troubleshoot, would recommend having Cathflo ordered for PICC line.

## 2022-09-30 NOTE — PROGRESS NOTES
Delayed entry from 8am rounds  Orthopaedics Daily Progress Note                            Date of Surgery:  9/21/2022      Patient: Mehdi Brannon   YOB: 1928  Age: 80 y.o. SUBJECTIVE:   9 Days Post-Op following RIGHT KNEE INCISION AND DRAINAGE WITH POLYETHYLENE EXCHANGE (SPINAL/IV SED). The patient's post operative pain is controlled. No CP/SOB. No N/V. The patient's mobility will be evaluated today during PT sessions. Plans are to transfer to [facility] when bed is available. OBJECTIVE:     Vital Signs:    Visit Vitals  BP (!) 154/92 (BP 1 Location: Left upper arm, BP Patient Position: At rest)   Pulse (!) 119   Temp 97.3 °F (36.3 °C)   Resp 18   Ht 5' 8\" (1.727 m)   Wt 188 lb (85.3 kg)   SpO2 96%   BMI 28.59 kg/m²       Physical Exam:  General: A&Ox3. The patient is cooperative, and in no acute distress. Respiratory: Respirations are unlabored. Surgical site(s): Well approximated incision, eschar remain. No w/e/d. Musculoskeletal: Calves are soft, supple, and non-tender upon palpation. Motor 5/5. Neurological:  Neurovascularly intact with good dorsi and plantar flexion. Pulses symmetrical.    Laboratory Values:             Recent Results (from the past 12 hour(s))   PROTHROMBIN TIME + INR    Collection Time: 09/30/22  7:29 AM   Result Value Ref Range    INR 2.4 (H) 0.9 - 1.1      Prothrombin time 24.0 (H) 9.0 - 11.1 sec         PLAN:     S/P RIGHT KNEE INCISION AND DRAINAGE WITH POLYETHYLENE EXCHANGE (SPINAL/IV SED) -Continue WBAT. -Mobilize and continue with PT/OT until discharged     Hemodynamics cute blood loss anemia as expected. Patient asymptomatic. Continue to monitor. Wound No dressings needed     Post Operative Pain Pain Control: stable, mild-to-moderate joint symptoms intermittently, reasonably well controlled by current meds. DVT Prophylaxis Continue with SCD'S, Ankle Pump Exercises.  Warfarin     Discharge Disposition Discharge plan: [facility] pending bed availability. Plan for voiding trial at rehab. IV ABX per infectious disease, final orders are in. Follow up with Dr. Abram Graff office in 2-3 weeks.         Signed By: Mayra Carrasco PA-C  September 30, 2022 11:40 AM

## 2022-09-30 NOTE — PROGRESS NOTES
RUR 13 %     Transition of Care- [facility] today via [facility] at 4pm -  Discharge Folder located on the hard chart with [facility] PCS- EMTALA to follow. Griselda Rivet in the room notified. Medicare pt has received, reviewed, and signed 2nd IM letter informing them of their right to appeal the discharge. Signed copied has been placed on pt bedside chart.       JORDAN Walters

## 2022-10-05 LAB
BACTERIA SPEC CULT: ABNORMAL
BACTERIA SPEC CULT: NORMAL
GRAM STN SPEC: ABNORMAL
GRAM STN SPEC: ABNORMAL
GRAM STN SPEC: NORMAL
GRAM STN SPEC: NORMAL
SERVICE CMNT-IMP: ABNORMAL
SERVICE CMNT-IMP: NORMAL

## 2022-10-19 ENCOUNTER — OFFICE VISIT (OUTPATIENT)
Dept: ORTHOPEDIC SURGERY | Age: 87
End: 2022-10-19
Payer: MEDICARE

## 2022-10-19 VITALS — WEIGHT: 188 LBS | BODY MASS INDEX: 28.49 KG/M2 | HEIGHT: 68 IN

## 2022-10-19 DIAGNOSIS — T84.53XD INFECTION OF TOTAL RIGHT KNEE REPLACEMENT, SUBSEQUENT ENCOUNTER: Primary | ICD-10-CM

## 2022-10-19 DIAGNOSIS — Z96.651 STATUS POST REVISION OF TOTAL REPLACEMENT OF RIGHT KNEE: ICD-10-CM

## 2022-10-19 DIAGNOSIS — Z09 SURGERY FOLLOW-UP: ICD-10-CM

## 2022-10-19 PROCEDURE — 20610 DRAIN/INJ JOINT/BURSA W/O US: CPT | Performed by: ORTHOPAEDIC SURGERY

## 2022-10-19 PROCEDURE — 99024 POSTOP FOLLOW-UP VISIT: CPT | Performed by: ORTHOPAEDIC SURGERY

## 2022-10-19 NOTE — PROGRESS NOTES
Sydney Singh (: 1928) is a 80 y.o. male, patient, here for evaluation of the following chief complaint(s):  Surgical Follow-up (PO #1: Rev RTK: 22/)       SUBJECTIVE/OBJECTIVE:  Sydney Singh presents today for routine follow-up 3 to 4 weeks out from irrigation and debridement with polyethylene exchange, right total knee replacement, for treatment of deep periprosthetic infection. 1 intraoperative cultures grew staph epidermidis, but multiple preoperative cultures grew strep salivarus. To acute rehab postop, had a bad experience there overall. He has been home with home physical therapy and doing much better. No fever or chills. Has appropriate pain in the right knee. Patient and family note significant swelling. He saw Dr. Susana Santacruz yesterday. PHYSICAL EXAM:  Vitals: Ht 5' 8\" (1.727 m)   Wt 188 lb (85.3 kg)   BMI 28.59 kg/m²   Body mass index is 28.59 kg/m². 80y.o. year old M, no distress. He moves quite well with his cane. Swelling present in the right knee, and despite appearance, this is much better than he was prior to surgery. Anterior incision is well-healed. Appears to have some effusion, bogginess suggests some organized hematoma as well. Lacks 10 degrees of active knee extension, full passive, and can maintain straight leg raise consult placed there passively. Flexion well past 90 degrees. No instability. Symmetrical 1+ distal edema. No calf tenderness. IMAGING:  Radiographs: No images today. ASSESSMENT/PLAN:  1. Infection of total right knee replacement, subsequent encounter  -     CELL COUNT, SYNOVIAL; Future  -     CULTURE, BODY FLUID W GRAM STAIN; Future  -     CULTURE, ANAEROBIC; Future  2. Status post revision of total replacement of right knee  3. Surgery follow-up    Overall I think he is doing fairly well, all considered. Right knee was aspirated again today mainly as a therapeutic measure, but fluid was also sent off.     Discussed risks and benefits of right knee aspiration, verbal consent obtained. After sterile prep of right lateral knee, skin was anesthetized with a few cc of 0.75% Marcaine. 18-gauge solid bore needle was utilized to aspirate the right knee under sterile conditions. Approximately 40 cc of bloody joint fluid was obtained. Fluid was collected in appropriate containers for cell count differential as well as cultures. Continue home physical therapy. Continue ceftriaxone under direction of Dr. Maggy Looney. Anticipate long-term oral suppressive regimen after IV therapy is complete. He will return to see me in 3 to 4 weeks, sooner if needed. No follow-ups on file. Review Of Systems  ROS    Positive for: Skin, Musculoskeletal  Last edited by Maria Elena Route on 10/19/2022  9:25 AM.         Patient denies any recent fever, chills, nausea, vomiting, chest pain, or shortness of breath. Allergies   Allergen Reactions    Tape [Adhesive] Other (comments)     REDNESS, SKIN TEARS - BANDAID, PAPER TAPE       Current Outpatient Medications   Medication Sig    metoprolol tartrate (LOPRESSOR) 50 mg tablet Take 1 Tablet by mouth two (2) times a day for 30 days. acetaminophen (TYLENOL) 500 mg tablet Take 1 Tablet by mouth every four (4) hours (while awake). senna-docusate (PERICOLACE) 8.6-50 mg per tablet Take 1 Tablet by mouth two (2) times a day for 30 days. cefTRIAXone 2 gram 2 g IV syringe 2 g by IntraVENous route every twenty-four (24) hours for 40 days. warfarin (COUMADIN) 5 mg tablet Take 5 mg by mouth every evening. furosemide (LASIX) 40 mg tablet Take 40 mg by mouth daily. furosemide (LASIX) 20 mg tablet Take  by mouth nightly. vit C/E/Zn/coppr/lutein/zeaxan (PRESERVISION AREDS-2 PO) Take 1 Tablet by mouth two (2) times a day. latanoprost (XALATAN) 0.005 % ophthalmic solution Administer 1 Drop to both eyes nightly.     cholecalciferol (VITAMIN D3) (1000 Units /25 mcg) tablet Take 1,000 Units by mouth daily. No current facility-administered medications for this visit.        Past Medical History:   Diagnosis Date    Arthritis     DJD    Cancer (HCC)     prostate    Cancer ([address]     SKIN CA RIGHT EYEBROW AREA    Chronic pain     History of colonoscopy with polypectomy 2005~    Hypercholesterolemia     Macular degeneration     Other ill-defined conditions(799.89)     cataracts    PAF (paroxysmal atrial fibrillation) (HCC)     Thromboembolus (HCC)     3 x left leg, 1 x right leg 1990'S        Past Surgical History:   Procedure Laterality Date    HX CATARACT REMOVAL Bilateral     HX KNEE REPLACEMENT Left     HX KNEE REPLACEMENT Right 09/21/2022    Revision    HX MOHS PROCEDURES  1995    right    HX OTHER SURGICAL  1996    Tony filter insertion    HX PROSTATECTOMY  age 72    HX ROTATOR CUFF REPAIR Right     HX TONSILLECTOMY      [place] LIGMT REVISION,KNEE,EXTRA-ARTIC Right 04/2021    [place] TOTAL HIP ARTHROPLASTY      left    [place] TOTAL KNEE ARTHROPLASTY      right    VASCULAR SURGERY PROCEDURE UNLIST      1996 IVC FILTER       Family History   Problem Relation Age of Onset    Cancer Mother         STOMACH    Cancer Father         PROSTATE    Heart Disease Brother         MI    Anesth Problems Neg Hx         Social History     Socioeconomic History    Marital status:      Spouse name: Not on file    Number of children: Not on file    Years of education: Not on file    Highest education level: Not on file   Occupational History    Not on file   Tobacco Use    Smoking status: Former     Types: Cigars     Quit date: 2012     Years since quitting: 10.8    Smokeless tobacco: Never    Tobacco comments:     quit 15 yrs ago   Vaping Use    Vaping Use: Never used   Substance and Sexual Activity    Alcohol use: Not Currently    Drug use: No    Sexual activity: Not Currently   Other Topics Concern    Not on file   Social History Narrative    Not on file     Social Determinants of Health     Financial Resource Strain: Not on file   Food Insecurity: Not on file   Transportation Needs: Not on file   Physical Activity: Not on file   Stress: Not on file   Social Connections: Not on file   Intimate Partner Violence: Not on file   Housing Stability: Not on file       Orders Placed This Encounter    CULTURE, BODY FLUID W GRAM STAIN     Right knee     Standing Status:   Future     Standing Expiration Date:   10/19/2023     Order Specific Question:   Tube Number:     Answer:   1    CULTURE, ANAEROBIC     Standing Status:   Future     Standing Expiration Date:   10/19/2023     Order Specific Question:   Specimen Source     Answer:   Knee, right [708]    CELL COUNT, SYNOVIAL     Right knee     Standing Status:   Future     Standing Expiration Date:   10/19/2023        An electronic signature was used to authenticate this note.   -- Mushtaq Salgado MD

## 2022-10-19 NOTE — LETTER
10/19/2022    Patient: Triny Rush   YOB: 1928   Date of Visit: 10/19/2022     Nirali Malcolm MD  [address] [place] [address] [phone]  Via Fax: [phone]    Dear Nirali Malcolm MD,      Thank you for referring Mr. Des Schmidt to [facility] for evaluation. My notes for this consultation are attached. If you have questions, please do not hesitate to call me. I look forward to following your patient along with you.       Sincerely,    Elliot Sanchez MD

## 2022-10-23 LAB
BACTERIA SPEC CULT: NORMAL
BACTERIA SPEC CULT: NORMAL
GRAM STN SPEC: NORMAL
SERVICE CMNT-IMP: NORMAL
SERVICE CMNT-IMP: NORMAL

## 2022-10-27 ENCOUNTER — HOSPITAL ENCOUNTER (OUTPATIENT)
Dept: LAB | Age: 87
Discharge: HOME | End: 2022-10-27

## 2022-11-16 ENCOUNTER — OFFICE VISIT (OUTPATIENT)
Dept: ORTHOPEDIC SURGERY | Age: 87
End: 2022-11-16
Payer: MEDICARE

## 2022-11-16 VITALS — HEIGHT: 68 IN | WEIGHT: 188 LBS | BODY MASS INDEX: 28.49 KG/M2

## 2022-11-16 DIAGNOSIS — T84.53XD INFECTION OF TOTAL RIGHT KNEE REPLACEMENT, SUBSEQUENT ENCOUNTER: ICD-10-CM

## 2022-11-16 DIAGNOSIS — Z96.651 STATUS POST REVISION OF TOTAL REPLACEMENT OF RIGHT KNEE: Primary | ICD-10-CM

## 2022-11-16 PROCEDURE — 99024 POSTOP FOLLOW-UP VISIT: CPT | Performed by: ORTHOPAEDIC SURGERY

## 2022-11-16 NOTE — PROGRESS NOTES
Sherwin Basilio (: 1928) is a 80 y.o. male, patient, here for evaluation of the following chief complaint(s):  Surgical Follow-up (Rev. RTK: 22)       SUBJECTIVE/OBJECTIVE:  Sherwin Basilio presents today for routine follow-up 7-8 weeks out from irrigation and debridement with polyethylene exchange, right total knee replacement, for treatment of deep periprosthetic infection. One intraoperative culture grew staph epidermidis, but multiple preoperative cultures grew strep salivarus. He is being followed by Dr. Asael Marcos. IV therapy has now been completed, and he is on an oral regimen. Relates persistent knee swelling. Mild discomfort. His biggest concern is poor balance overall. He uses a cane or walker at all times. PHYSICAL EXAM:  Vitals: Ht 5' 8\" (1.727 m)   Wt 188 lb (85.3 kg)   BMI 28.59 kg/m²   Body mass index is 28.59 kg/m². 80y.o. year old M, no distress. Ambulates with a mild limp on the right side. Right knee neutral alignment. Healed midline anterior scar. 2+ effusion. There is some mild chronic discoloration over the anterior skin, with faint erythema as well. Trace warmth. 30 degree active extensor lag, but can maintain full extension once placed there passively. Knee flexes past 90 degrees. IMAGING:  Radiographs: No new images. ASSESSMENT/PLAN:  1. Status post revision of total replacement of right knee  2. Infection of total right knee replacement, subsequent encounter    7 to 8 weeks status post debridement and polyethylene exchange for treatment of strep species infection. Long-term plan is suppression. He will continue oral antibiotic therapy under direction of Dr. Asael Marcos. He will continue with home physical therapy and home exercise program.  I will prescribe a Rollator for him which I think will be better for his balance overall. Return in 8 weeks for clinical recheck, sooner if needed. No follow-ups on file.               Review Of Systems  ROS    Positive for: Musculoskeletal  Last edited by Marry David on 11/16/2022 10:53 AM.         Patient denies any recent fever, chills, nausea, vomiting, chest pain, or shortness of breath. Allergies   Allergen Reactions    Tape [Adhesive] Other (comments)     REDNESS, SKIN TEARS - BANDAID, PAPER TAPE       Current Outpatient Medications   Medication Sig    acetaminophen (TYLENOL) 500 mg tablet Take 1 Tablet by mouth every four (4) hours (while awake). warfarin (COUMADIN) 5 mg tablet Take 5 mg by mouth every evening. furosemide (LASIX) 40 mg tablet Take 40 mg by mouth daily. furosemide (LASIX) 20 mg tablet Take  by mouth nightly. vit C/E/Zn/coppr/lutein/zeaxan (PRESERVISION AREDS-2 PO) Take 1 Tablet by mouth two (2) times a day. latanoprost (XALATAN) 0.005 % ophthalmic solution Administer 1 Drop to both eyes nightly. cholecalciferol (VITAMIN D3) (1000 Units /25 mcg) tablet Take 1,000 Units by mouth daily. No current facility-administered medications for this visit.        Past Medical History:   Diagnosis Date    Arthritis     DJD    Cancer ([facility] [address]     prostate    Cancer ([facility] [address]     SKIN CA RIGHT EYEBROW AREA    Chronic pain     History of colonoscopy with polypectomy 2005~    Hypercholesterolemia     Macular degeneration     Other ill-defined conditions(799.89)     cataracts    PAF (paroxysmal atrial fibrillation) (HCC)     Thromboembolus (HCC)     3 x left leg, 1 x right leg 1990'S        Past Surgical History:   Procedure Laterality Date    HX CATARACT REMOVAL Bilateral     HX KNEE REPLACEMENT Left     HX KNEE REPLACEMENT Right 09/21/2022    Revision    HX MOHS PROCEDURES  1995    right    HX OTHER SURGICAL  1996    Tony filter insertion    HX PROSTATECTOMY  age 72    HX ROTATOR CUFF REPAIR Right     HX TONSILLECTOMY      [place] LIGMT REVISION,KNEE,EXTRA-ARTIC Right 04/2021    [place] TOTAL HIP ARTHROPLASTY      left    [place] TOTAL KNEE ARTHROPLASTY      right    VASCULAR SURGERY PROCEDURE UNLIST      1996 IVC FILTER       Family History   Problem Relation Age of Onset    Cancer Mother         STOMACH    Cancer Father         PROSTATE    Heart Disease Brother         MI    Anesth Problems Neg Hx         Social History     Socioeconomic History    Marital status:      Spouse name: Not on file    Number of children: Not on file    Years of education: Not on file    Highest education level: Not on file   Occupational History    Not on file   Tobacco Use    Smoking status: Former     Types: Cigars     Quit date: 2012     Years since quitting: 10.8    Smokeless tobacco: Never    Tobacco comments:     quit 15 yrs ago   Vaping Use    Vaping Use: Never used   Substance and Sexual Activity    Alcohol use: Not Currently    Drug use: No    Sexual activity: Not Currently   Other Topics Concern    Not on file   Social History Narrative    Not on file     Social Determinants of Health     Financial Resource Strain: Not on file   Food Insecurity: Not on file   Transportation Needs: Not on file   Physical Activity: Not on file   Stress: Not on file   Social Connections: Not on file   Intimate Partner Violence: Not on file   Housing Stability: Not on file       No orders of the defined types were placed in this encounter. An electronic signature was used to authenticate this note.   -- Som House MD

## 2022-11-16 NOTE — LETTER
11/16/2022    Patient: Kevin Zaidi   YOB: 1928   Date of Visit: 11/16/2022     Brandy Wilson MD  [address] [place] [address] [phone]  Via Fax: [phone]    Dear Brandy Wilson MD,      Thank you for referring Mr. Norman Pozo to [facility] for evaluation. My notes for this consultation are attached. If you have questions, please do not hesitate to call me. I look forward to following your patient along with you.       Sincerely,    Latanya Fry MD

## 2023-01-18 ENCOUNTER — OFFICE VISIT (OUTPATIENT)
Dept: ORTHOPEDIC SURGERY | Age: 88
End: 2023-01-18
Payer: MEDICARE

## 2023-01-18 VITALS — HEIGHT: 69 IN | BODY MASS INDEX: 27.25 KG/M2 | WEIGHT: 184 LBS

## 2023-01-18 DIAGNOSIS — T84.53XD INFECTION OF TOTAL RIGHT KNEE REPLACEMENT, SUBSEQUENT ENCOUNTER: ICD-10-CM

## 2023-01-18 DIAGNOSIS — Z96.651 STATUS POST REVISION OF TOTAL REPLACEMENT OF RIGHT KNEE: Primary | ICD-10-CM

## 2023-02-20 NOTE — PROGRESS NOTES
Physical Therapy  9/26/2022    Chart reviewed. Pt just returned to bed w/ OT. Per OT, pt's HR reached into 120's. Will allow pt to rest at this time and follow up at later time as able/appropriate.        Delaney Means, PTA grossly 3+/5 all joints, right ankle not assessed due to c/o pain/grossly assessed due to

## 2023-02-26 NOTE — PROGRESS NOTES
Jose Jarvis (: 1928) is a 80 y.o. male, patient, here for evaluation of the following chief complaint(s):  Surgical Follow-up (Infection RTK: 22)       SUBJECTIVE/OBJECTIVE:  Jose Jarvis presents today for routine follow-up approximately 16 weeks post irrigation and debridement with polyethylene exchange, right total knee replacement, for treatment of deep periprosthetic infection. He is a patient of Dr. Olivia Sterling, and has completed IV physical therapy. He continues with oral suppression. Notes persistent swelling and balance issues. Relying on a rollator for balance and ambulation assistance. PHYSICAL EXAM:  Vitals: Ht 5' 9\" (1.753 m)   Wt 184 lb (83.5 kg)   BMI 27.17 kg/m²   Body mass index is 27.17 kg/m². 80y.o. year old M in no acute distress. Ambulates with a mild limp on the right. Neutral alignment of the right knee with healed midline anterior scar. 1-2+ effusion. 20 to 30 degree extensor lag, but can maintain full extension at 0 when passively placed there. Knee flexion past 90 degrees. IMAGING:  Radiographs: No new x-rays today  ASSESSMENT/PLAN:  1. Status post revision of total replacement of right knee  2. Infection of total right knee replacement, subsequent encounter    16 weeks status post debridement and polyethylene exchange for treatment of deep periprosthetic infection in the setting of right total knee. Continue oral suppression, treatment by Dr. Olivia Sterling. Continue physician directed home exercise program.  Recommended rollator for assistance and balance control. Discussed the risks and benefits of aspiration today, not recommended as it will likely return. Want to avoid the risk of further infection. Follow-up in 6 months for clinical recheck, sooner if needed. Return in about 6 months (around 2023).     Review Of Systems  ROS    Positive for: Musculoskeletal  Last edited by Efrain Conrad on 2023 10:44 AM.         Patient denies any recent fever, chills, nausea, vomiting, chest pain, or shortness of breath. Allergies   Allergen Reactions    Tape [Adhesive] Other (comments)     REDNESS, SKIN TEARS - BANDAID, PAPER TAPE       Current Outpatient Medications   Medication Sig    acetaminophen (TYLENOL) 500 mg tablet Take 1 Tablet by mouth every four (4) hours (while awake). warfarin (COUMADIN) 5 mg tablet Take 5 mg by mouth every evening. furosemide (LASIX) 40 mg tablet Take 40 mg by mouth daily. furosemide (LASIX) 20 mg tablet Take  by mouth nightly. vit C/E/Zn/coppr/lutein/zeaxan (PRESERVISION AREDS-2 PO) Take 1 Tablet by mouth two (2) times a day. latanoprost (XALATAN) 0.005 % ophthalmic solution Administer 1 Drop to both eyes nightly. cholecalciferol (VITAMIN D3) (1000 Units /25 mcg) tablet Take 1,000 Units by mouth daily. No current facility-administered medications for this visit.        Past Medical History:   Diagnosis Date    Arthritis     DJD    Cancer ([facility] [address]     prostate    Cancer ([facility] [address]     SKIN CA RIGHT EYEBROW AREA    Chronic pain     History of colonoscopy with polypectomy 2005~    Hypercholesterolemia     Macular degeneration     Other ill-defined conditions(799.89)     cataracts    PAF (paroxysmal atrial fibrillation) (HCC)     Thromboembolus (HCC)     3 x left leg, 1 x right leg 1990'S        Past Surgical History:   Procedure Laterality Date    HX CATARACT REMOVAL Bilateral     HX KNEE REPLACEMENT Left     HX KNEE REPLACEMENT Right 09/21/2022    Revision    HX MOHS PROCEDURES  1995    right    HX OTHER SURGICAL  1996    Tony filter insertion    HX PROSTATECTOMY  age 72    HX ROTATOR CUFF REPAIR Right     HX TONSILLECTOMY      [place] ARTHRP ACETBLR/PROX FEM PROSTC AGRFT/ALGRFT      left    WV ARTHRP KNE CONDYLE&PLATU MEDIAL&LAT COMPARTMENTS      right    WV LIGAMENTOUS RECONSTRUCTION KNEE EXTRA-ARTICULAR Right 04/2021    [place] UNLISTED PROCEDURE VASCULAR SURGERY      1996 IVC FILTER       Family History   Problem Relation Age of Onset    Cancer Mother         STOMACH    Cancer Father         PROSTATE    Heart Disease Brother         MI    Anesth Problems Neg Hx         Social History     Socioeconomic History    Marital status:      Spouse name: Not on file    Number of children: Not on file    Years of education: Not on file    Highest education level: Not on file   Occupational History    Not on file   Tobacco Use    Smoking status: Former     Types: Cigars     Quit date:      Years since quittin.1    Smokeless tobacco: Never    Tobacco comments:     quit 15 yrs ago   Vaping Use    Vaping Use: Never used   Substance and Sexual Activity    Alcohol use: Not Currently    Drug use: No    Sexual activity: Not Currently   Other Topics Concern    Not on file   Social History Narrative    Not on file     Social Determinants of Health     Financial Resource Strain: Not on file   Food Insecurity: Not on file   Transportation Needs: Not on file   Physical Activity: Not on file   Stress: Not on file   Social Connections: Not on file   Intimate Partner Violence: Not on file   Housing Stability: Not on file         No orders of the defined types were placed in this encounter. Janifer File. Arthur Arrieta M.D. was available for immediate consultation as the supervising physician. An electronic signature was used to authenticate this note.   -- Lito Mitchell PA-C

## (undated) DEVICE — SYR 20ML LL STRL LF --

## (undated) DEVICE — MARKER,SKIN,WI/RULER AND LABELS: Brand: MEDLINE

## (undated) DEVICE — Device

## (undated) DEVICE — SCRUB DRY SURG EZ SCRUB BRUSH PREOPERATIVE GRN

## (undated) DEVICE — TOTAL TRAY, 16FR 10ML SIL FOLEY, URN: Brand: MEDLINE

## (undated) DEVICE — SYR 10ML LUER LOK 1/5ML GRAD --

## (undated) DEVICE — T5 HOOD WITH PEEL AWAY FACE SHIELD

## (undated) DEVICE — SUTURE VCRL SZ 0 L27IN ABSRB UD L36MM CT-1 1/2 CIR J260H

## (undated) DEVICE — STERILE POLYISOPRENE POWDER-FREE SURGICAL GLOVES WITH EMOLLIENT COATING: Brand: PROTEXIS

## (undated) DEVICE — PREP SKN CHLRAPRP APL 26ML STR --

## (undated) DEVICE — BANDAGE COMPR M W6INXL10YD WHT BGE VELC E MTRX HK AND LOOP

## (undated) DEVICE — STERILE POLYISOPRENE POWDER-FREE SURGICAL GLOVES: Brand: PROTEXIS

## (undated) DEVICE — SOL IRR SOD CL 0.9% 3000ML --

## (undated) DEVICE — DERMABOND SKIN ADH 0.7ML -- DERMABOND ADVANCED 12/BX

## (undated) DEVICE — GOWN,PREVENTION PLUS,XLN/2XL,ST,22/CS: Brand: MEDLINE

## (undated) DEVICE — DRESSING HYDROFIBER AQUACEL AG ADVANTAGE 3.5X14 IN

## (undated) DEVICE — Z DISCONTINUED USE 2744636  DRESSING AQUACEL 14 IN ALG W3.5XL14IN POLYUR FLM CVR W/ HYDRCOLL

## (undated) DEVICE — 4-PORT MANIFOLD: Brand: NEPTUNE 2

## (undated) DEVICE — SUTURE PDS II SZ 1 L36IN ABSRB VLT L48MM CTX 1/2 CIR Z371T

## (undated) DEVICE — SUTURE MCRYL SZ 3-0 L27IN ABSRB UD L24MM PS-1 3/8 CIR PRIM Y936H

## (undated) DEVICE — PADDING CAST SPEC 6INX4YD COT --

## (undated) DEVICE — 3M™ IOBAN™ 2 ANTIMICROBIAL INCISE DRAPE 6651EZ: Brand: IOBAN™ 2

## (undated) DEVICE — SOLUTION IRRIG 3000ML 0.9% SOD CHL USP UROMATIC PLAS CONT

## (undated) DEVICE — GLOVE SURG SZ 7 L12IN FNGR THK79MIL GRN LTX FREE

## (undated) DEVICE — SOL IRR STRL H2O 1000ML BTL --

## (undated) DEVICE — INTENDED FOR TISSUE SEPARATION, AND OTHER PROCEDURES THAT REQUIRE A SHARP SURGICAL BLADE TO PUNCTURE OR CUT.: Brand: BARD-PARKER ® CARBON RIB-BACK BLADES

## (undated) DEVICE — CATHETER,FOLEY,COUDE,LATEX,14FR,10ML: Brand: MEDLINE

## (undated) DEVICE — BAG WND LAV 1L CLR ETH ACET ACID SOD ACETT BENZALKONIUM CHL

## (undated) DEVICE — DRAPE,EXTREMITY,89X128,STERILE: Brand: MEDLINE

## (undated) DEVICE — HEADLESS TROCHAR PIN 75MM: Brand: ZUK

## (undated) DEVICE — NEEDLE HYPO 22GA L1.5IN BLK S STL HUB POLYPR SHLD REG BVL

## (undated) DEVICE — GLOVE SURG SZ 65 L12IN FNGR THK94MIL STD WHT LTX FREE

## (undated) DEVICE — REM POLYHESIVE ADULT PATIENT RETURN ELECTRODE: Brand: VALLEYLAB

## (undated) DEVICE — YANKAUER,OPEN TIP,W/O VENT,STERILE: Brand: MEDLINE INDUSTRIES, INC.

## (undated) DEVICE — HYPODERMIC SAFETY NEEDLE: Brand: MAGELLAN

## (undated) DEVICE — HANDPIECE SET WITH BONE CLEANING TIP AND SUCTION TUBE: Brand: INTERPULSE

## (undated) DEVICE — PADDING CST 6IN STERILE --

## (undated) DEVICE — GLOVE ORANGE PI 8 1/2   MSG9085

## (undated) DEVICE — SOLUTION IRRIG 1000ML STRL H2O USP PLAS POUR BTL

## (undated) DEVICE — SUTURE VCRL 1 L27IN ABSRB CT BRAID COAT UD J281H

## (undated) DEVICE — SOLUTION SURG PREP 26 CC PURPREP

## (undated) DEVICE — SUTURE STRATAFIX SPRL SZ 1 L14IN ABSRB VLT L48CM CTX 1/2 SXPD2B405

## (undated) DEVICE — TOTAL JOINT - SMH: Brand: MEDLINE INDUSTRIES, INC.

## (undated) DEVICE — SPONGE GZ W4XL4IN COT 12 PLY TYP VII WVN C FLD DSGN

## (undated) DEVICE — STRYKER PERFORMANCE SERIES SAGITTAL BLADE: Brand: STRYKER PERFORMANCE SERIES

## (undated) DEVICE — SUTURE PDS II SZ 2-0 L27IN ABSRB VLT L26MM CT-2 1/2 CIR Z333H

## (undated) DEVICE — SPONGE GZ W4XL4IN COT RADPQ HIGHLY ABSRB

## (undated) DEVICE — CONTAINER,SPECIMEN,3OZ,OR STRL: Brand: MEDLINE

## (undated) DEVICE — GLOVE SURG SZ 85 L12IN FNGR ORTHO 126MIL CRM LTX FREE

## (undated) DEVICE — SUTURE ETHBND EXCEL SZ 2 L30IN NONABSORBABLE GRN L40MM V-37 MX69G

## (undated) DEVICE — INFECTION CONTROL KIT SYS

## (undated) DEVICE — SUTURE PDS + SZ 0 L36IN ABSRB VLT CT 1 L36MM 1 2 CIR PDP346H

## (undated) DEVICE — STRAP,POSITIONING,KNEE/BODY,FOAM,4X60": Brand: MEDLINE

## (undated) DEVICE — SPONGE,LAP,18"X18",XR,ST,5/TRAY: Brand: MEDLINE

## (undated) DEVICE — CONTAINER,SPECIMEN,4OZ,OR STRL: Brand: MEDLINE

## (undated) DEVICE — SUTURE VCRL SZ 2-0 L36IN ABSRB UD L40MM CT 1/2 CIR J957H

## (undated) DEVICE — CARTRIDGE BNE CEM MIX UNIV TWR VAC ROTOR BRK OFF NOZ W/O

## (undated) DEVICE — ZIMMER® STERILE DISPOSABLE TOURNIQUET CUFF WITH PLC, DUAL PORT, SINGLE BLADDER, 34 IN. (86 CM)

## (undated) DEVICE — 2T11 #2 PDO 36 X 36: Brand: 2T11 #2 PDO 36 X 36